# Patient Record
Sex: MALE | Race: WHITE | NOT HISPANIC OR LATINO | URBAN - METROPOLITAN AREA
[De-identification: names, ages, dates, MRNs, and addresses within clinical notes are randomized per-mention and may not be internally consistent; named-entity substitution may affect disease eponyms.]

---

## 2019-03-07 ENCOUNTER — INPATIENT (INPATIENT)
Facility: HOSPITAL | Age: 84
LOS: 0 days | Discharge: HOME | DRG: 259 | End: 2019-03-08
Attending: INTERNAL MEDICINE | Admitting: INTERNAL MEDICINE
Payer: MEDICARE

## 2019-03-07 VITALS
OXYGEN SATURATION: 95 % | SYSTOLIC BLOOD PRESSURE: 151 MMHG | TEMPERATURE: 98 F | HEART RATE: 60 BPM | DIASTOLIC BLOOD PRESSURE: 83 MMHG | WEIGHT: 139.99 LBS | RESPIRATION RATE: 16 BRPM | HEIGHT: 66 IN

## 2019-03-07 DIAGNOSIS — Z45.018 ENCOUNTER FOR ADJUSTMENT AND MANAGEMENT OF OTHER PART OF CARDIAC PACEMAKER: ICD-10-CM

## 2019-03-07 LAB
ANION GAP SERPL CALC-SCNC: 13 MMOL/L — SIGNIFICANT CHANGE UP (ref 5–17)
APTT BLD: 30.8 SEC — SIGNIFICANT CHANGE UP (ref 27.5–36.3)
BUN SERPL-MCNC: 30 MG/DL — HIGH (ref 7–23)
CALCIUM SERPL-MCNC: 9.3 MG/DL — SIGNIFICANT CHANGE UP (ref 8.4–10.5)
CHLORIDE SERPL-SCNC: 99 MMOL/L — SIGNIFICANT CHANGE UP (ref 96–108)
CO2 SERPL-SCNC: 25 MMOL/L — SIGNIFICANT CHANGE UP (ref 22–31)
CREAT SERPL-MCNC: 1.58 MG/DL — HIGH (ref 0.5–1.3)
GLUCOSE SERPL-MCNC: 90 MG/DL — SIGNIFICANT CHANGE UP (ref 70–99)
HCT VFR BLD CALC: 40 % — SIGNIFICANT CHANGE UP (ref 39–50)
HGB BLD-MCNC: 14 G/DL — SIGNIFICANT CHANGE UP (ref 13–17)
INR BLD: 0.97 RATIO — SIGNIFICANT CHANGE UP (ref 0.88–1.16)
MAGNESIUM SERPL-MCNC: 2.5 MG/DL — SIGNIFICANT CHANGE UP (ref 1.6–2.6)
MCHC RBC-ENTMCNC: 32.5 PG — SIGNIFICANT CHANGE UP (ref 27–34)
MCHC RBC-ENTMCNC: 35 GM/DL — SIGNIFICANT CHANGE UP (ref 32–36)
MCV RBC AUTO: 92.9 FL — SIGNIFICANT CHANGE UP (ref 80–100)
PLATELET # BLD AUTO: 262 K/UL — SIGNIFICANT CHANGE UP (ref 150–400)
POTASSIUM SERPL-MCNC: 4.5 MMOL/L — SIGNIFICANT CHANGE UP (ref 3.5–5.3)
POTASSIUM SERPL-SCNC: 4.5 MMOL/L — SIGNIFICANT CHANGE UP (ref 3.5–5.3)
PROTHROM AB SERPL-ACNC: 11 SEC — SIGNIFICANT CHANGE UP (ref 10–12.9)
RBC # BLD: 4.3 M/UL — SIGNIFICANT CHANGE UP (ref 4.2–5.8)
RBC # FLD: 12.7 % — SIGNIFICANT CHANGE UP (ref 10.3–14.5)
SODIUM SERPL-SCNC: 137 MMOL/L — SIGNIFICANT CHANGE UP (ref 135–145)
WBC # BLD: 7.8 K/UL — SIGNIFICANT CHANGE UP (ref 3.8–10.5)
WBC # FLD AUTO: 7.8 K/UL — SIGNIFICANT CHANGE UP (ref 3.8–10.5)

## 2019-03-07 PROCEDURE — 93010 ELECTROCARDIOGRAM REPORT: CPT

## 2019-03-07 RX ORDER — METOPROLOL TARTRATE 50 MG
50 TABLET ORAL
Qty: 0 | Refills: 0 | Status: DISCONTINUED | OUTPATIENT
Start: 2019-03-07 | End: 2019-03-08

## 2019-03-07 RX ORDER — ISOSORBIDE MONONITRATE 60 MG/1
30 TABLET, EXTENDED RELEASE ORAL DAILY
Qty: 0 | Refills: 0 | Status: DISCONTINUED | OUTPATIENT
Start: 2019-03-07 | End: 2019-03-08

## 2019-03-07 RX ORDER — FINASTERIDE 5 MG/1
5 TABLET, FILM COATED ORAL DAILY
Qty: 0 | Refills: 0 | Status: DISCONTINUED | OUTPATIENT
Start: 2019-03-07 | End: 2019-03-08

## 2019-03-07 RX ORDER — SIMVASTATIN 20 MG/1
10 TABLET, FILM COATED ORAL AT BEDTIME
Qty: 0 | Refills: 0 | Status: DISCONTINUED | OUTPATIENT
Start: 2019-03-07 | End: 2019-03-08

## 2019-03-07 RX ORDER — AMLODIPINE BESYLATE 2.5 MG/1
5 TABLET ORAL DAILY
Qty: 0 | Refills: 0 | Status: DISCONTINUED | OUTPATIENT
Start: 2019-03-07 | End: 2019-03-08

## 2019-03-07 RX ORDER — CEFAZOLIN SODIUM 1 G
500 VIAL (EA) INJECTION EVERY 8 HOURS
Qty: 0 | Refills: 0 | Status: COMPLETED | OUTPATIENT
Start: 2019-03-07 | End: 2019-03-08

## 2019-03-07 RX ORDER — LEVOTHYROXINE SODIUM 125 MCG
25 TABLET ORAL DAILY
Qty: 0 | Refills: 0 | Status: DISCONTINUED | OUTPATIENT
Start: 2019-03-07 | End: 2019-03-08

## 2019-03-07 RX ORDER — CEPHALEXIN 500 MG
250 CAPSULE ORAL EVERY 8 HOURS
Qty: 0 | Refills: 0 | Status: DISCONTINUED | OUTPATIENT
Start: 2019-03-09 | End: 2019-03-08

## 2019-03-07 RX ORDER — FUROSEMIDE 40 MG
60 TABLET ORAL DAILY
Qty: 0 | Refills: 0 | Status: DISCONTINUED | OUTPATIENT
Start: 2019-03-07 | End: 2019-03-08

## 2019-03-07 RX ORDER — ASPIRIN/CALCIUM CARB/MAGNESIUM 324 MG
81 TABLET ORAL DAILY
Qty: 0 | Refills: 0 | Status: DISCONTINUED | OUTPATIENT
Start: 2019-03-07 | End: 2019-03-08

## 2019-03-07 RX ADMIN — Medication 100 MILLIGRAM(S): at 22:15

## 2019-03-07 RX ADMIN — Medication 50 MILLIGRAM(S): at 19:02

## 2019-03-07 RX ADMIN — SIMVASTATIN 10 MILLIGRAM(S): 20 TABLET, FILM COATED ORAL at 22:15

## 2019-03-07 NOTE — H&P CARDIOLOGY - PMH
Bradycardia  s/p PPM  Hyperlipidemia    Hypertension BPH (benign prostatic hyperplasia)    Hyperlipidemia    Hypertension    Hypothyroidism (acquired)

## 2019-03-07 NOTE — H&P CARDIOLOGY - HISTORY OF PRESENT ILLNESS
89 yo Male w/ PMH of HTN, HLD and Bradycardia s/p PPM who presents for generator change today. 89 yo  Male w/ PMH of HTN, HLD and Bradycardia s/p PPM who presents for generator change today.  Pt denies dizziness, diaphoresis, palpitations, nausea, vomiting, peripheral edema, recent weight gain, or syncope.

## 2019-03-07 NOTE — CHART NOTE - NSCHARTNOTEFT_GEN_A_CORE
Pacemaker Generator Replacement    Indication: Complete heart block, pacemaker at elective replacement    Preoperative diagnosis: Complete heart block, pacemaker generator fatigue    Post operative dx: complete heart block    Proceduralist : Dr Danny Padgett    After the risk and benefits of the procedure were explained to patient and son, the patient underwent an elective replacement of a St Mo pacemaker generator. The patient was brought to the EP lab and draped and prepped in the usual sterile manner and thereafter , 15 cc of 1 % xylocaine/ Sensorcaine was injected in the left upper chest in the area of the old generator. Alexandr 1.5 oncision was made, , the old generator was removed and then a new St Mo generator was attached to the existing leads, Threshold, lead impedances, P and R waves were stable. The patient was programmed DDDR  BPM. Hemostasis was achieved. The wound was closed with 2.0 Vicryl and 4 o Monocryl. Dermabond and steri-strips was placed. Ancef 2 g were given    Conclusion: Successful generator replacement    recommend:    1./ancef 500 mg q 8 hr x 2 then 250 mg TID x 5 days ( creatinine clearance 28mg/dl)  2/ tylenol for pain  3/ with advanced age, lives in assisted living with minimal assistance, keep overnight for observation

## 2019-03-08 ENCOUNTER — TRANSCRIPTION ENCOUNTER (OUTPATIENT)
Age: 84
End: 2019-03-08

## 2019-03-08 VITALS — SYSTOLIC BLOOD PRESSURE: 144 MMHG | DIASTOLIC BLOOD PRESSURE: 72 MMHG | HEART RATE: 80 BPM

## 2019-03-08 LAB
ANION GAP SERPL CALC-SCNC: 13 MMOL/L — SIGNIFICANT CHANGE UP (ref 5–17)
BUN SERPL-MCNC: 25 MG/DL — HIGH (ref 7–23)
CALCIUM SERPL-MCNC: 8.8 MG/DL — SIGNIFICANT CHANGE UP (ref 8.4–10.5)
CHLORIDE SERPL-SCNC: 101 MMOL/L — SIGNIFICANT CHANGE UP (ref 96–108)
CO2 SERPL-SCNC: 22 MMOL/L — SIGNIFICANT CHANGE UP (ref 22–31)
CREAT SERPL-MCNC: 1.49 MG/DL — HIGH (ref 0.5–1.3)
GLUCOSE SERPL-MCNC: 83 MG/DL — SIGNIFICANT CHANGE UP (ref 70–99)
HCT VFR BLD CALC: 39.1 % — SIGNIFICANT CHANGE UP (ref 39–50)
HGB BLD-MCNC: 13.6 G/DL — SIGNIFICANT CHANGE UP (ref 13–17)
MCHC RBC-ENTMCNC: 32.4 PG — SIGNIFICANT CHANGE UP (ref 27–34)
MCHC RBC-ENTMCNC: 34.8 GM/DL — SIGNIFICANT CHANGE UP (ref 32–36)
MCV RBC AUTO: 93.2 FL — SIGNIFICANT CHANGE UP (ref 80–100)
PLATELET # BLD AUTO: 236 K/UL — SIGNIFICANT CHANGE UP (ref 150–400)
POTASSIUM SERPL-MCNC: 4.2 MMOL/L — SIGNIFICANT CHANGE UP (ref 3.5–5.3)
POTASSIUM SERPL-SCNC: 4.2 MMOL/L — SIGNIFICANT CHANGE UP (ref 3.5–5.3)
RBC # BLD: 4.2 M/UL — SIGNIFICANT CHANGE UP (ref 4.2–5.8)
RBC # FLD: 13.2 % — SIGNIFICANT CHANGE UP (ref 10.3–14.5)
SODIUM SERPL-SCNC: 136 MMOL/L — SIGNIFICANT CHANGE UP (ref 135–145)
WBC # BLD: 9.6 K/UL — SIGNIFICANT CHANGE UP (ref 3.8–10.5)
WBC # FLD AUTO: 9.6 K/UL — SIGNIFICANT CHANGE UP (ref 3.8–10.5)

## 2019-03-08 PROCEDURE — 33228 REMV&REPLC PM GEN DUAL LEAD: CPT

## 2019-03-08 PROCEDURE — 85730 THROMBOPLASTIN TIME PARTIAL: CPT

## 2019-03-08 PROCEDURE — 85027 COMPLETE CBC AUTOMATED: CPT

## 2019-03-08 PROCEDURE — 85610 PROTHROMBIN TIME: CPT

## 2019-03-08 PROCEDURE — 97161 PT EVAL LOW COMPLEX 20 MIN: CPT

## 2019-03-08 PROCEDURE — 93005 ELECTROCARDIOGRAM TRACING: CPT

## 2019-03-08 PROCEDURE — 83735 ASSAY OF MAGNESIUM: CPT

## 2019-03-08 PROCEDURE — C1785: CPT

## 2019-03-08 PROCEDURE — 80048 BASIC METABOLIC PNL TOTAL CA: CPT

## 2019-03-08 RX ORDER — LEVOTHYROXINE SODIUM 125 MCG
1 TABLET ORAL
Qty: 0 | Refills: 0 | COMMUNITY

## 2019-03-08 RX ORDER — SIMVASTATIN 20 MG/1
1 TABLET, FILM COATED ORAL
Qty: 0 | Refills: 0 | COMMUNITY

## 2019-03-08 RX ORDER — INFLUENZA VIRUS VACCINE 15; 15; 15; 15 UG/.5ML; UG/.5ML; UG/.5ML; UG/.5ML
0.5 SUSPENSION INTRAMUSCULAR ONCE
Qty: 0 | Refills: 0 | Status: DISCONTINUED | OUTPATIENT
Start: 2019-03-08 | End: 2019-03-08

## 2019-03-08 RX ORDER — METOPROLOL TARTRATE 50 MG
1 TABLET ORAL
Qty: 0 | Refills: 0 | COMMUNITY

## 2019-03-08 RX ORDER — CEPHALEXIN 500 MG
1 CAPSULE ORAL
Qty: 15 | Refills: 0
Start: 2019-03-08 | End: 2019-03-12

## 2019-03-08 RX ORDER — ACETAMINOPHEN 500 MG
2 TABLET ORAL
Qty: 30 | Refills: 0
Start: 2019-03-08 | End: 2019-03-12

## 2019-03-08 RX ADMIN — Medication 60 MILLIGRAM(S): at 05:49

## 2019-03-08 RX ADMIN — FINASTERIDE 5 MILLIGRAM(S): 5 TABLET, FILM COATED ORAL at 10:00

## 2019-03-08 RX ADMIN — AMLODIPINE BESYLATE 5 MILLIGRAM(S): 2.5 TABLET ORAL at 05:49

## 2019-03-08 RX ADMIN — Medication 100 MILLIGRAM(S): at 05:49

## 2019-03-08 RX ADMIN — Medication 25 MICROGRAM(S): at 05:49

## 2019-03-08 RX ADMIN — Medication 50 MILLIGRAM(S): at 05:49

## 2019-03-08 RX ADMIN — ISOSORBIDE MONONITRATE 30 MILLIGRAM(S): 60 TABLET, EXTENDED RELEASE ORAL at 10:00

## 2019-03-08 RX ADMIN — Medication 81 MILLIGRAM(S): at 10:00

## 2019-03-08 NOTE — DISCHARGE NOTE PROVIDER - CARE PROVIDER_API CALL
Danny Padgett)  Cardiology  [address], [address]  [place] [address]  Phone: [phone]  Fax: [phone]  Follow Up Time: 1 week    Dr. Espino (Internal MD AT [facility]),   Phone: (   )    -  Fax: (   )    -  Follow Up Time: 1-3 days

## 2019-03-08 NOTE — DISCHARGE NOTE NURSING/CASE MANAGEMENT/SOCIAL WORK - NSDCDPATPORTLINK_GEN_ALL_CORE
You can access the [facility] Patient Portal, offered by [facility], by registering with the following website: [email or web address]

## 2019-03-08 NOTE — PHYSICAL THERAPY INITIAL EVALUATION ADULT - PLANNED THERAPY INTERVENTIONS, PT EVAL
Pt is cleared for DC home to [facility], PT will Dc pt at this time as pt is fxl independent, remain on amb aide program

## 2019-03-08 NOTE — DISCHARGE NOTE NURSING/CASE MANAGEMENT/SOCIAL WORK - NSDCCRNAME_GEN_ALL_CORE_FT
[facility] [phone]  Spoke with Yady in [facility] and faxed her a copy of discharge summary per her request.  New medication has been sent to [facility] [phone] by treatment team.

## 2019-03-08 NOTE — DISCHARGE NOTE PROVIDER - NSDCCPTREATMENT_GEN_ALL_CORE_FT
PRINCIPAL PROCEDURE  Procedure: Removal and replacement of multiple lead pacemaker pulse generator  Findings and Treatment: Continue your 5-day course of antibiotics prophylactically and Tylenol as needed for pain.  Follow-up with EP-Dr. Padgett in one week

## 2019-03-08 NOTE — PHYSICAL THERAPY INITIAL EVALUATION ADULT - ACTIVE RANGE OF MOTION EXAMINATION, REHAB EVAL
Right LE Active ROM was WFL (within functional limits)/L shld flex NT 2* PPM change/Left UE Active ROM was WFL (within functional limits)/Left LE Active ROM was WFL (within functional limits)/Right UE Active ROM was WFL (within functional limits)

## 2019-03-08 NOTE — DISCHARGE NOTE PROVIDER - NSDCCPCAREPLAN_GEN_ALL_CORE_FT
PRINCIPAL DISCHARGE DIAGNOSIS  Problem: Encounter for checking or testing of cardiac pacemaker pulse generator  Assessment and Plan of Treatment: s/p generator change. Continue medications as prescribed and follow-up with Dr. Padgett (EP) in one week      SECONDARY DISCHARGE DIAGNOSES  Problem: Benign essential HTN  Assessment and Plan of Treatment: Low salt diet  Activity as tolerated.  Take all medication as prescribed.  Follow up with your medical doctor for routine blood pressure monitoring at your next visit.  Notify your doctor if you have any of the following symptoms:   Dizziness, Lightheadedness, Blurry vision, Headache, Chest pain, Shortness of breath    Problem: HLD (hyperlipidemia)  Assessment and Plan of Treatment: Continue current medications as prescribed.

## 2019-03-08 NOTE — DISCHARGE NOTE PROVIDER - PROVIDER TOKENS
PROVIDER:[TOKEN:[750:MIIS:750],FOLLOWUP:[1 week]],FREE:[LAST:[Dr. Espino (Internal MD AT [facility])],PHONE:[(   )    -],FAX:[(   )    -],FOLLOWUP:[1-3 days]]

## 2019-03-08 NOTE — PHYSICAL THERAPY INITIAL EVALUATION ADULT - PERTINENT HX OF CURRENT PROBLEM, REHAB EVAL
Pt is a 89 yo  Male admitted to [facility] on 3/7/19 for generator change today. Pt denies dizziness, diaphoresis, palpitations, nausea, vomiting, peripheral edema, recent weight gain, or syncope. Pt w/ PMH of HTN, HLD and Bradycardia s/p PPM

## 2019-03-08 NOTE — PHYSICAL THERAPY INITIAL EVALUATION ADULT - ADDITIONAL COMMENTS
pt lives at [facility], +elevator access, amb with rolling walker, assist as needed for ADLs, participates in social events, goes to exercise class

## 2019-03-08 NOTE — DISCHARGE NOTE PROVIDER - HOSPITAL COURSE
89 yo  Male w/ PMH of HTN, HLD and Bradycardia s/p PPM who presents for generator change, with no complaints. Patient underwent an elective replacement of a St Mo pacemaker generator, in which old generator was removed and then a new St Mo generator was attached to the existing leads, Threshold, lead impedances, P and R waves were stable. The patient was programmed DDDR  BPM. Pt s/p successful generator replacement, monitored overnight given advanced age and assisted living with minimal assistance, no events on telemetry, site benign with no signs of active bleeding. Pt to complete 5-day course of antibiotics and f/u with EP in one week for ongoing management, and PCP at Assisted Living-Dr. Espino for routine medical management.

## 2019-03-08 NOTE — PHYSICAL THERAPY INITIAL EVALUATION ADULT - DISCHARGE DISPOSITION, PT EVAL
no skilled PT needs/DC home (return to snf) with no skilled PT needs, owns rolling walker, assist from staff as needed, BABS garcia, pt agrees.

## 2020-10-31 ENCOUNTER — INPATIENT (INPATIENT)
Facility: HOSPITAL | Age: 85
LOS: 8 days | Discharge: HOME | DRG: 291 | End: 2020-11-09
Attending: INTERNAL MEDICINE | Admitting: INTERNAL MEDICINE
Payer: MEDICARE

## 2020-10-31 VITALS
HEIGHT: 66 IN | HEART RATE: 70 BPM | SYSTOLIC BLOOD PRESSURE: 130 MMHG | RESPIRATION RATE: 20 BRPM | WEIGHT: 139.99 LBS | DIASTOLIC BLOOD PRESSURE: 70 MMHG | TEMPERATURE: 99 F | OXYGEN SATURATION: 96 %

## 2020-10-31 DIAGNOSIS — R09.02 HYPOXEMIA: ICD-10-CM

## 2020-10-31 LAB
ALBUMIN SERPL ELPH-MCNC: 4 G/DL — SIGNIFICANT CHANGE UP (ref 3.3–5)
ALP SERPL-CCNC: 89 U/L — SIGNIFICANT CHANGE UP (ref 40–120)
ALT FLD-CCNC: 18 U/L — SIGNIFICANT CHANGE UP (ref 10–45)
ANION GAP SERPL CALC-SCNC: 11 MMOL/L — SIGNIFICANT CHANGE UP (ref 5–17)
APPEARANCE UR: CLEAR — SIGNIFICANT CHANGE UP
APTT BLD: 34 SEC — SIGNIFICANT CHANGE UP (ref 27.5–35.5)
AST SERPL-CCNC: 31 U/L — SIGNIFICANT CHANGE UP (ref 10–40)
BASOPHILS # BLD AUTO: 0.1 K/UL — SIGNIFICANT CHANGE UP (ref 0–0.2)
BASOPHILS NFR BLD AUTO: 1.2 % — SIGNIFICANT CHANGE UP (ref 0–2)
BILIRUB SERPL-MCNC: 0.8 MG/DL — SIGNIFICANT CHANGE UP (ref 0.2–1.2)
BILIRUB UR-MCNC: NEGATIVE — SIGNIFICANT CHANGE UP
BUN SERPL-MCNC: 31 MG/DL — HIGH (ref 7–23)
CALCIUM SERPL-MCNC: 8.9 MG/DL — SIGNIFICANT CHANGE UP (ref 8.4–10.5)
CHLORIDE SERPL-SCNC: 99 MMOL/L — SIGNIFICANT CHANGE UP (ref 96–108)
CO2 SERPL-SCNC: 25 MMOL/L — SIGNIFICANT CHANGE UP (ref 22–31)
COLOR SPEC: SIGNIFICANT CHANGE UP
CREAT SERPL-MCNC: 1.74 MG/DL — HIGH (ref 0.5–1.3)
DIFF PNL FLD: NEGATIVE — SIGNIFICANT CHANGE UP
EOSINOPHIL # BLD AUTO: 0.42 K/UL — SIGNIFICANT CHANGE UP (ref 0–0.5)
EOSINOPHIL NFR BLD AUTO: 5.1 % — SIGNIFICANT CHANGE UP (ref 0–6)
GAS PNL BLDV: SIGNIFICANT CHANGE UP
GLUCOSE SERPL-MCNC: 103 MG/DL — HIGH (ref 70–99)
GLUCOSE UR QL: NEGATIVE — SIGNIFICANT CHANGE UP
HCT VFR BLD CALC: 33.9 % — LOW (ref 39–50)
HGB BLD-MCNC: 10.8 G/DL — LOW (ref 13–17)
IMM GRANULOCYTES NFR BLD AUTO: 0.5 % — SIGNIFICANT CHANGE UP (ref 0–1.5)
INR BLD: 1.26 RATIO — HIGH (ref 0.88–1.16)
KETONES UR-MCNC: NEGATIVE — SIGNIFICANT CHANGE UP
LEUKOCYTE ESTERASE UR-ACNC: NEGATIVE — SIGNIFICANT CHANGE UP
LYMPHOCYTES # BLD AUTO: 0.92 K/UL — LOW (ref 1–3.3)
LYMPHOCYTES # BLD AUTO: 11.2 % — LOW (ref 13–44)
MCHC RBC-ENTMCNC: 30.8 PG — SIGNIFICANT CHANGE UP (ref 27–34)
MCHC RBC-ENTMCNC: 31.9 GM/DL — LOW (ref 32–36)
MCV RBC AUTO: 96.6 FL — SIGNIFICANT CHANGE UP (ref 80–100)
MONOCYTES # BLD AUTO: 0.96 K/UL — HIGH (ref 0–0.9)
MONOCYTES NFR BLD AUTO: 11.6 % — SIGNIFICANT CHANGE UP (ref 2–14)
NEUTROPHILS # BLD AUTO: 5.81 K/UL — SIGNIFICANT CHANGE UP (ref 1.8–7.4)
NEUTROPHILS NFR BLD AUTO: 70.4 % — SIGNIFICANT CHANGE UP (ref 43–77)
NITRITE UR-MCNC: NEGATIVE — SIGNIFICANT CHANGE UP
NRBC # BLD: 0 /100 WBCS — SIGNIFICANT CHANGE UP (ref 0–0)
PH UR: 6.5 — SIGNIFICANT CHANGE UP (ref 5–8)
PLATELET # BLD AUTO: 354 K/UL — SIGNIFICANT CHANGE UP (ref 150–400)
POTASSIUM SERPL-MCNC: 4.4 MMOL/L — SIGNIFICANT CHANGE UP (ref 3.5–5.3)
POTASSIUM SERPL-SCNC: 4.4 MMOL/L — SIGNIFICANT CHANGE UP (ref 3.5–5.3)
PROT SERPL-MCNC: 6.8 G/DL — SIGNIFICANT CHANGE UP (ref 6–8.3)
PROT UR-MCNC: NEGATIVE — SIGNIFICANT CHANGE UP
PROTHROM AB SERPL-ACNC: 15 SEC — HIGH (ref 10.6–13.6)
RBC # BLD: 3.51 M/UL — LOW (ref 4.2–5.8)
RBC # FLD: 17 % — HIGH (ref 10.3–14.5)
SARS-COV-2 RNA SPEC QL NAA+PROBE: SIGNIFICANT CHANGE UP
SODIUM SERPL-SCNC: 135 MMOL/L — SIGNIFICANT CHANGE UP (ref 135–145)
SP GR SPEC: 1.01 — SIGNIFICANT CHANGE UP (ref 1.01–1.02)
UROBILINOGEN FLD QL: NEGATIVE — SIGNIFICANT CHANGE UP
WBC # BLD: 8.25 K/UL — SIGNIFICANT CHANGE UP (ref 3.8–10.5)
WBC # FLD AUTO: 8.25 K/UL — SIGNIFICANT CHANGE UP (ref 3.8–10.5)

## 2020-10-31 PROCEDURE — 71045 X-RAY EXAM CHEST 1 VIEW: CPT | Mod: 26

## 2020-10-31 PROCEDURE — 93010 ELECTROCARDIOGRAM REPORT: CPT

## 2020-10-31 PROCEDURE — 99285 EMERGENCY DEPT VISIT HI MDM: CPT

## 2020-10-31 PROCEDURE — 99223 1ST HOSP IP/OBS HIGH 75: CPT

## 2020-10-31 RX ORDER — AZITHROMYCIN 500 MG/1
500 TABLET, FILM COATED ORAL ONCE
Refills: 0 | Status: COMPLETED | OUTPATIENT
Start: 2020-10-31 | End: 2020-10-31

## 2020-10-31 RX ORDER — CEFTRIAXONE 500 MG/1
1000 INJECTION, POWDER, FOR SOLUTION INTRAMUSCULAR; INTRAVENOUS ONCE
Refills: 0 | Status: COMPLETED | OUTPATIENT
Start: 2020-10-31 | End: 2020-10-31

## 2020-10-31 RX ADMIN — CEFTRIAXONE 100 MILLIGRAM(S): 500 INJECTION, POWDER, FOR SOLUTION INTRAMUSCULAR; INTRAVENOUS at 19:56

## 2020-10-31 RX ADMIN — AZITHROMYCIN 250 MILLIGRAM(S): 500 TABLET, FILM COATED ORAL at 20:34

## 2020-10-31 NOTE — H&P ADULT - HISTORY OF PRESENT ILLNESS
89 yo male with PMH of HTN, HLD, Bradycardia s/p PPM, presents here from assisted living for hypoxia.       89 yo male with PMH of HTN, HLD, Bradycardia s/p PPM, on aspirin and eliquis, hypothyroidism, BPH, presents here from assisted living for hypoxia.  Patient states for last few days he noticed b/l LE swelling right > left.  He also has been having mild SOB, today started having some cough, unable to bring up phlegm.  He denies any fever or chills.  Also denies chest pain, orthopnea, PND.  Denies nausea, vomiting, abdominal pain, diarrhea.  Per ED note, patient was found to have oxygen saturation of 85%, was started on 4L NC.  CXR was consistent with pneumonia and therefore patient was sent to [facility].  On arrival, patient's vitals showed temp 98.9, HR 70, /70, saturation 96% on 4L, now on 2L.  Patient otherwise currently denies any complaints.           89 yo male with PMH of HTN, HLD, Bradycardia s/p PPM, on aspirin and eliquis, hypothyroidism, BPH, presents here from assisted living for hypoxia.  History obtained from both patient and staff at Assisted living.  Patient states for last few days he noticed b/l LE swelling right > left.  He also has been having mild SOB, today started having some cough, unable to bring up phlegm.  He denies any fever or chills.  Also denies chest pain, orthopnea, PND.  Denies nausea, vomiting, abdominal pain, diarrhea.  Patient was ordered amoxicillin, but because of hypoxia of 85%, he was sent here for further evaluation.  He also had CXR was consistent with pneumonia and therefore patient was sent to [facility].  On arrival, patient's vitals showed temp 98.9, HR 70, /70, saturation 96% on 4L, now on 2L.  Patient otherwise currently denies any complaints.

## 2020-10-31 NOTE — ED PROVIDER NOTE - CARE PLAN
Principal Discharge DX:	Hypoxia  Secondary Diagnosis:	Pneumonia   Principal Discharge DX:	Hypoxia  Secondary Diagnosis:	Congestive heart failure, unspecified HF chronicity, unspecified heart failure type

## 2020-10-31 NOTE — ED CLERICAL - NS ED CLERK NOTE PRE-ARRIVAL INFORMATION; ADDITIONAL PRE-ARRIVAL INFORMATION
CC/Reason For referral: p.e. hard to breath. ox levels low 90%  Preferred Consultant(if applicable):  Who admits for you (if needed): abrudescu  Do you have documents you would like to fax over?  Would you still like to speak to an ED attending? yes

## 2020-10-31 NOTE — H&P ADULT - PROBLEM SELECTOR PLAN 1
Possibly 2/2 to pneumonia vs. CHF exacerbation  started on IV ceftriaxone, azithromycin, which will continue for now  check urine legionella  blood culture if patient develops fever  check ESR, CRP, Procalcitonine  check echocardiogram  c/w IV diuretics Possibly 2/2 to pneumonia vs. CHF exacerbation  started on IV ceftriaxone, azithromycin, which will continue for now  check urine legionella  blood culture  check ESR, CRP, Procalcitonine  check echocardiogram  c/w IV diuretics

## 2020-10-31 NOTE — H&P ADULT - PROBLEM SELECTOR PLAN 3
Unclear baseline creatinine  may be 2/2 to heart failure  will start IV lasix 40mg q12 for now  avoid other nephrotoxic agents

## 2020-10-31 NOTE — ED PROVIDER NOTE - OBJECTIVE STATEMENT
Cait Horner MD: 89yo M with PMH of HTN, HLD, bradycardia s/p PPM insertion on asa and Eliquis, hypothyroidism, BPH who presents from assisted living for hypoxia. As per EMS report, O2 sat is 85% requiring 4L NC. CXR done today reportedly showed pneumonia. Pt denies any symptoms except for increased LE swelling b/l about a week ago. Now satting 92% on RA, placed on 2L NC.     PMD: Dr. Espino Cait Horner MD: 89yo M with PMH of HTN, HLD, bradycardia s/p PPM insertion on asa and Eliquis, hypothyroidism, BPH who presents from assisted living for hypoxia. As per EMS report, O2 sat is 85% requiring 4L NC. CXR done today reportedly showed pneumonia. Pt denies any symptoms except for increased LE swelling b/l about a week ago. Now satting 92% on RA, placed on 2L NC.     PMD: Dr. Espino  Cards: Dr John Barrientos

## 2020-10-31 NOTE — ED ADULT NURSE NOTE - OBJECTIVE STATEMENT
89yr old male w/ pmhx of HTN, HLD, PPM (on ASA, and eliquis) and BPH BIBEMS from assisted living facility c/o difficulty breathing. Per EMS pt was having some difficulting breathing and sating at 85% on RA, CXR performed at assisted living facility that showed pt has PNA. Pt states he has been experiencing dyspnea for the past 2 days along with edema of B/L Extremities. Pt denies CP, NVD, Fevers, chills, GI,  symptoms, syncope or numbness and tingling of extremities. Upon assessment, +3 pitting edema noted on B/L extremities. Pt sating at 92% on RA, pt placed on CM, and 2L of NC. Pt assessed by MD, will reassess

## 2020-10-31 NOTE — ED ADULT NURSE NOTE - CHIEF COMPLAINT QUOTE
from assisted living with diff breathing; dx with pneumonia via xray; hypoxic on room air; 85% on ra

## 2020-10-31 NOTE — H&P ADULT - ASSESSMENT
89 yo male with PMH of HTN, HLD, Bradycardia s/p PPM, on aspirin and eliquis, hypothyroidism, BPH, presents here from assisted living for hypoxia.

## 2020-10-31 NOTE — ED PROVIDER NOTE - CLINICAL SUMMARY MEDICAL DECISION MAKING FREE TEXT BOX
Cait Horner MD: 89yo M with PMH of HTN, HLD, bradycardia s/p PPM insertion on asa and Eliquis, hypothyroidism, BPH who presents from assisted living for hypoxia in setting of pna on CXR. Pt is hypoxic to 92% on RA, now requiring 2L NC. Appears comfortable. Will send labs, CXR, EKG, start IV abx Cait Horner MD: 89yo M with PMH of HTN, HLD, bradycardia s/p PPM insertion on asa and Eliquis, hypothyroidism, BPH who presents from assisted living for hypoxia in setting of pna on CXR. Pt is hypoxic to 92% on RA, now requiring 2L NC. Appears comfortable. Will send labs, CXR, EKG, start IV abx    Swathi Patel MD - Attending Physician: Pt here with mild hypoxia, RA sat 92%. No fevers. Xr with possible PNA outpatient. Noted LE edema, rales on exam. Likely CHF, possible PNA tho. Labs, cultures, xr, empiric abx, tba

## 2020-10-31 NOTE — H&P ADULT - ATTENDING COMMENTS
Patient assigned to me by night hospitalist in charge for management and care for patient for this evening only. Care to be resumed by day hospitalist (Dr. Sanchez) in the morning and thereafter.     Patient's care rendered on 10/31/20 at 11:45PM.      Plan discussed with Patient, RN, nursing home staff.

## 2020-10-31 NOTE — H&P ADULT - NSHPLABSRESULTS_GEN_ALL_CORE
Labs personally reviewed:                          10.8   8.25  )-----------( 354      ( 31 Oct 2020 19:56 )             33.9     10-31    135  |  99  |  31<H>  ----------------------------<  103<H>  4.4   |  25  |  1.74<H>    Ca    8.9      31 Oct 2020 19:56    TPro  6.8  /  Alb  4.0  /  TBili  0.8  /  DBili  x   /  AST  31  /  ALT  18  /  AlkPhos  89  10-31        LIVER FUNCTIONS - ( 31 Oct 2020 19:56 )  Alb: 4.0 g/dL / Pro: 6.8 g/dL / ALK PHOS: 89 U/L / ALT: 18 U/L / AST: 31 U/L / GGT: x           PT/INR - ( 31 Oct 2020 19:56 )   PT: 15.0 sec;   INR: 1.26 ratio         PTT - ( 31 Oct 2020 19:56 )  PTT:34.0 sec  Urinalysis Basic - ( 31 Oct 2020 20:28 )    Color: Light Yellow / Appearance: Clear / S.012 / pH: x  Gluc: x / Ketone: Negative  / Bili: Negative / Urobili: Negative   Blood: x / Protein: Negative / Nitrite: Negative   Leuk Esterase: Negative / RBC: x / WBC x   Sq Epi: x / Non Sq Epi: x / Bacteria: x      CAPILLARY BLOOD GLUCOSE          Imaging:  CXR personally reviewed: right pleural effusion    EKG pending

## 2020-10-31 NOTE — H&P ADULT - NSHPPHYSICALEXAM_GEN_ALL_CORE
PHYSICAL EXAM:  Vital Signs Last 24 Hrs  T(C): 36.4 (10-31-20 @ 22:28)  T(F): 97.6 (10-31-20 @ 22:28), Max: 98.9 (10-31-20 @ 19:13)  HR: 69 (10-31-20 @ 22:28) (69 - 76)  BP: 144/78 (10-31-20 @ 22:28)  BP(mean): --  RR: 18 (10-31-20 @ 22:28) (17 - 20)  SpO2: 97% (10-31-20 @ 22:28) (92% - 97%)  Wt(kg): --    Constitutional: NAD, awake and alert  EYES: EOMI  ENT:  Normal Hearing, no tonsillar exudates   Neck: Soft and supple, + JVD  Lungs: mild crackles right base  Heart: S1 and S2, regular rate and rhythm, no Murmurs, gallops or rubs  Abdomen: Bowel Sounds present, soft, nontender, nondistended, no guarding, no rebound  Extremities: +3 edema b/l LE; no calf tenderness  Vascular: 2+ peripheral pulses lower ex  Neurological: A/O x 3, no focal deficits  Musculoskeletal: 5/5 strength b/l upper and lower extremities  Skin: No rashes  Psych: no depression or anhedonia  HEME: no bruises, no nose bleeds

## 2020-10-31 NOTE — ED PROVIDER NOTE - PHYSICAL EXAMINATION
CONSTITUTIONAL: Nontoxic, well nourished, well developed, elderly male, resting comfortably in no acute distress  HEAD: Normocephalic; atraumatic  EYES: Normal inspection, EOMI  ENMT: External appears normal; normal oropharynx  NECK: Supple; non-tender; no cervical lymphadenopathy  CARD: RRR; no audible murmurs, rubs, or gallops  RESP: No respiratory distress, decreased lung sounds in R lower lobe  ABD: Soft, non-distended; non-tender; no rebound or guarding  EXT: 3+ b/l LE pitting edema, no calf tenderness; distal pulses intact with good capillary refill  SKIN: Warm, dry, intact  NEURO: aaox3, moving all extremities spontaneously

## 2020-10-31 NOTE — H&P ADULT - NSHPSOCIALHISTORY_GEN_ALL_CORE
Denies tobacco use, drinks wine occasionally, walks with a cane, resident at [facility] assisted living

## 2020-10-31 NOTE — H&P ADULT - PROBLEM SELECTOR PLAN 2
Patient with right pleural effusion, JVD, b/l LE edema, elevated BNP  robb tart IV lasix 40mg q12 for now  monitor ins/outs and daily weights  check echocardiogram Patient with right pleural effusion, JVD, b/l LE edema, elevated BNP  Patient has elevated troponin, possibly 2/2 to CHF vs. RJ  Denies any chest pain  trend cardiac enzymes  robb start IV lasix 40mg q12 for now  monitor ins/outs and daily weights  check echocardiogram

## 2020-10-31 NOTE — ED PROVIDER NOTE - PROGRESS NOTE DETAILS
Spoke with Dr Barrientos, pt's cardiologist. Sent pt in after call from NH with hypoxia and week of increasing LE edema. Requesting ECHO for tomorrow. Will come see patient in AM. Dr Sanchez aware of patient for admission. Cait Horner MD: RLL opacity on CXR. Treated with IV ceftriaxone and azithromycin for pna. Spoke to Dr. Sanchez for admission Cait Horner MD: RLL opacity on CXR atelectasis vs pna. Treated with IV ceftriaxone and azithromycin for empiric pna. Spoke to Dr. Sanchez for admission

## 2020-10-31 NOTE — ED PROVIDER NOTE - PMH
BPH (benign prostatic hyperplasia)    Hyperlipidemia    Hypertension    Hypothyroidism (acquired)

## 2020-10-31 NOTE — H&P ADULT - NSICDXPASTMEDICALHX_GEN_ALL_CORE_FT
PAST MEDICAL HISTORY:  BPH (benign prostatic hyperplasia)     H/O cardiac arrhythmia     Hyperlipidemia     Hypertension     Hypothyroidism (acquired)

## 2020-10-31 NOTE — H&P ADULT - NSHPREVIEWOFSYSTEMS_GEN_ALL_CORE
CONSTITUTIONAL: No weakness, fevers or chills  EYES/ENT: No visual changes;  No dysphagia  NECK: No pain or stiffness  RESPIRATORY: +cough, +SOB  CARDIOVASCULAR: No chest pain or palpitations; + lower extremity edema  EXTREMITIES: no cyanosis, clubbing  MUSCULOSKELETAL: no joint pain, swelling  GASTROINTESTINAL: No abdominal or epigastric pain. No nausea, vomiting, or hematemesis; No diarrhea or constipation. No melena or hematochezia.  BACK: No back pain  GENITOURINARY: No dysuria, frequency or hematuria  NEUROLOGICAL: No numbness or weakness  SKIN: No itching, burning, rashes, or lesions   PSYCH: no agitation  All other review of systems is negative unless indicated above.

## 2020-11-01 DIAGNOSIS — E78.5 HYPERLIPIDEMIA, UNSPECIFIED: ICD-10-CM

## 2020-11-01 DIAGNOSIS — I50.9 HEART FAILURE, UNSPECIFIED: ICD-10-CM

## 2020-11-01 DIAGNOSIS — Z29.9 ENCOUNTER FOR PROPHYLACTIC MEASURES, UNSPECIFIED: ICD-10-CM

## 2020-11-01 DIAGNOSIS — N17.9 ACUTE KIDNEY FAILURE, UNSPECIFIED: ICD-10-CM

## 2020-11-01 DIAGNOSIS — N40.0 BENIGN PROSTATIC HYPERPLASIA WITHOUT LOWER URINARY TRACT SYMPTOMS: ICD-10-CM

## 2020-11-01 DIAGNOSIS — I10 ESSENTIAL (PRIMARY) HYPERTENSION: ICD-10-CM

## 2020-11-01 DIAGNOSIS — E78.2 MIXED HYPERLIPIDEMIA: ICD-10-CM

## 2020-11-01 DIAGNOSIS — Z90.89 ACQUIRED ABSENCE OF OTHER ORGANS: Chronic | ICD-10-CM

## 2020-11-01 DIAGNOSIS — E03.9 HYPOTHYROIDISM, UNSPECIFIED: ICD-10-CM

## 2020-11-01 DIAGNOSIS — R09.89 OTHER SPECIFIED SYMPTOMS AND SIGNS INVOLVING THE CIRCULATORY AND RESPIRATORY SYSTEMS: ICD-10-CM

## 2020-11-01 DIAGNOSIS — Z86.79 PERSONAL HISTORY OF OTHER DISEASES OF THE CIRCULATORY SYSTEM: ICD-10-CM

## 2020-11-01 DIAGNOSIS — J96.01 ACUTE RESPIRATORY FAILURE WITH HYPOXIA: ICD-10-CM

## 2020-11-01 DIAGNOSIS — R60.0 LOCALIZED EDEMA: ICD-10-CM

## 2020-11-01 LAB
ALBUMIN SERPL ELPH-MCNC: 4 G/DL — SIGNIFICANT CHANGE UP (ref 3.3–5)
ALP SERPL-CCNC: 93 U/L — SIGNIFICANT CHANGE UP (ref 40–120)
ALT FLD-CCNC: 21 U/L — SIGNIFICANT CHANGE UP (ref 10–45)
ANION GAP SERPL CALC-SCNC: 13 MMOL/L — SIGNIFICANT CHANGE UP (ref 5–17)
ANION GAP SERPL CALC-SCNC: 14 MMOL/L — SIGNIFICANT CHANGE UP (ref 5–17)
AST SERPL-CCNC: 34 U/L — SIGNIFICANT CHANGE UP (ref 10–40)
BILIRUB SERPL-MCNC: 1 MG/DL — SIGNIFICANT CHANGE UP (ref 0.2–1.2)
BUN SERPL-MCNC: 27 MG/DL — HIGH (ref 7–23)
BUN SERPL-MCNC: 27 MG/DL — HIGH (ref 7–23)
CALCIUM SERPL-MCNC: 9.2 MG/DL — SIGNIFICANT CHANGE UP (ref 8.4–10.5)
CALCIUM SERPL-MCNC: 9.2 MG/DL — SIGNIFICANT CHANGE UP (ref 8.4–10.5)
CHLORIDE SERPL-SCNC: 97 MMOL/L — SIGNIFICANT CHANGE UP (ref 96–108)
CHLORIDE SERPL-SCNC: 97 MMOL/L — SIGNIFICANT CHANGE UP (ref 96–108)
CO2 SERPL-SCNC: 24 MMOL/L — SIGNIFICANT CHANGE UP (ref 22–31)
CO2 SERPL-SCNC: 25 MMOL/L — SIGNIFICANT CHANGE UP (ref 22–31)
CREAT SERPL-MCNC: 1.49 MG/DL — HIGH (ref 0.5–1.3)
CREAT SERPL-MCNC: 1.54 MG/DL — HIGH (ref 0.5–1.3)
CRP SERPL-MCNC: 1.71 MG/DL — HIGH (ref 0–0.4)
CULTURE RESULTS: SIGNIFICANT CHANGE UP
ERYTHROCYTE [SEDIMENTATION RATE] IN BLOOD: 47 MM/HR — HIGH (ref 0–20)
GLUCOSE SERPL-MCNC: 91 MG/DL — SIGNIFICANT CHANGE UP (ref 70–99)
GLUCOSE SERPL-MCNC: 92 MG/DL — SIGNIFICANT CHANGE UP (ref 70–99)
HCT VFR BLD CALC: 34.2 % — LOW (ref 39–50)
HCT VFR BLD CALC: 35.3 % — LOW (ref 39–50)
HGB BLD-MCNC: 11 G/DL — LOW (ref 13–17)
HGB BLD-MCNC: 11.1 G/DL — LOW (ref 13–17)
MAGNESIUM SERPL-MCNC: 2.2 MG/DL — SIGNIFICANT CHANGE UP (ref 1.6–2.6)
MCHC RBC-ENTMCNC: 30.1 PG — SIGNIFICANT CHANGE UP (ref 27–34)
MCHC RBC-ENTMCNC: 30.8 PG — SIGNIFICANT CHANGE UP (ref 27–34)
MCHC RBC-ENTMCNC: 31.4 GM/DL — LOW (ref 32–36)
MCHC RBC-ENTMCNC: 32.2 GM/DL — SIGNIFICANT CHANGE UP (ref 32–36)
MCV RBC AUTO: 95.7 FL — SIGNIFICANT CHANGE UP (ref 80–100)
MCV RBC AUTO: 95.8 FL — SIGNIFICANT CHANGE UP (ref 80–100)
NRBC # BLD: 0 /100 WBCS — SIGNIFICANT CHANGE UP (ref 0–0)
NRBC # BLD: 0 /100 WBCS — SIGNIFICANT CHANGE UP (ref 0–0)
PHOSPHATE SERPL-MCNC: 3.3 MG/DL — SIGNIFICANT CHANGE UP (ref 2.5–4.5)
PLATELET # BLD AUTO: 361 K/UL — SIGNIFICANT CHANGE UP (ref 150–400)
PLATELET # BLD AUTO: 363 K/UL — SIGNIFICANT CHANGE UP (ref 150–400)
POTASSIUM SERPL-MCNC: 3.9 MMOL/L — SIGNIFICANT CHANGE UP (ref 3.5–5.3)
POTASSIUM SERPL-MCNC: 4 MMOL/L — SIGNIFICANT CHANGE UP (ref 3.5–5.3)
POTASSIUM SERPL-SCNC: 3.9 MMOL/L — SIGNIFICANT CHANGE UP (ref 3.5–5.3)
POTASSIUM SERPL-SCNC: 4 MMOL/L — SIGNIFICANT CHANGE UP (ref 3.5–5.3)
PROCALCITONIN SERPL-MCNC: 0.15 NG/ML — HIGH (ref 0.02–0.1)
PROT SERPL-MCNC: 7.2 G/DL — SIGNIFICANT CHANGE UP (ref 6–8.3)
RBC # BLD: 3.57 M/UL — LOW (ref 4.2–5.8)
RBC # BLD: 3.69 M/UL — LOW (ref 4.2–5.8)
RBC # FLD: 16.8 % — HIGH (ref 10.3–14.5)
RBC # FLD: 17 % — HIGH (ref 10.3–14.5)
SARS-COV-2 IGG SERPL QL IA: NEGATIVE — SIGNIFICANT CHANGE UP
SARS-COV-2 IGM SERPL IA-ACNC: 0.1 INDEX — SIGNIFICANT CHANGE UP
SODIUM SERPL-SCNC: 135 MMOL/L — SIGNIFICANT CHANGE UP (ref 135–145)
SODIUM SERPL-SCNC: 135 MMOL/L — SIGNIFICANT CHANGE UP (ref 135–145)
SPECIMEN SOURCE: SIGNIFICANT CHANGE UP
TROPONIN T, HIGH SENSITIVITY RESULT: 70 NG/L — HIGH (ref 0–51)
TROPONIN T, HIGH SENSITIVITY RESULT: 71 NG/L — HIGH (ref 0–51)
WBC # BLD: 9.76 K/UL — SIGNIFICANT CHANGE UP (ref 3.8–10.5)
WBC # BLD: 9.99 K/UL — SIGNIFICANT CHANGE UP (ref 3.8–10.5)
WBC # FLD AUTO: 9.76 K/UL — SIGNIFICANT CHANGE UP (ref 3.8–10.5)
WBC # FLD AUTO: 9.99 K/UL — SIGNIFICANT CHANGE UP (ref 3.8–10.5)

## 2020-11-01 PROCEDURE — 71250 CT THORAX DX C-: CPT | Mod: 26

## 2020-11-01 PROCEDURE — 93010 ELECTROCARDIOGRAM REPORT: CPT

## 2020-11-01 RX ORDER — FINASTERIDE 5 MG/1
5 TABLET, FILM COATED ORAL DAILY
Refills: 0 | Status: DISCONTINUED | OUTPATIENT
Start: 2020-11-01 | End: 2020-11-09

## 2020-11-01 RX ORDER — ACETAMINOPHEN 500 MG
650 TABLET ORAL EVERY 8 HOURS
Refills: 0 | Status: DISCONTINUED | OUTPATIENT
Start: 2020-11-01 | End: 2020-11-09

## 2020-11-01 RX ORDER — SIMVASTATIN 20 MG/1
10 TABLET, FILM COATED ORAL AT BEDTIME
Refills: 0 | Status: DISCONTINUED | OUTPATIENT
Start: 2020-11-01 | End: 2020-11-05

## 2020-11-01 RX ORDER — FUROSEMIDE 40 MG
40 TABLET ORAL
Refills: 0 | Status: DISCONTINUED | OUTPATIENT
Start: 2020-11-01 | End: 2020-11-02

## 2020-11-01 RX ORDER — AZITHROMYCIN 500 MG/1
500 TABLET, FILM COATED ORAL EVERY 24 HOURS
Refills: 0 | Status: DISCONTINUED | OUTPATIENT
Start: 2020-11-01 | End: 2020-11-03

## 2020-11-01 RX ORDER — AMLODIPINE BESYLATE 2.5 MG/1
5 TABLET ORAL DAILY
Refills: 0 | Status: DISCONTINUED | OUTPATIENT
Start: 2020-11-01 | End: 2020-11-03

## 2020-11-01 RX ORDER — METOPROLOL TARTRATE 50 MG
1 TABLET ORAL
Qty: 0 | Refills: 0 | DISCHARGE

## 2020-11-01 RX ORDER — ASPIRIN/CALCIUM CARB/MAGNESIUM 324 MG
1 TABLET ORAL
Qty: 0 | Refills: 0 | DISCHARGE

## 2020-11-01 RX ORDER — METOPROLOL TARTRATE 50 MG
50 TABLET ORAL DAILY
Refills: 0 | Status: DISCONTINUED | OUTPATIENT
Start: 2020-11-01 | End: 2020-11-02

## 2020-11-01 RX ORDER — ISOSORBIDE MONONITRATE 60 MG/1
1 TABLET, EXTENDED RELEASE ORAL
Qty: 0 | Refills: 0 | DISCHARGE

## 2020-11-01 RX ORDER — FINASTERIDE 5 MG/1
1 TABLET, FILM COATED ORAL
Qty: 0 | Refills: 0 | DISCHARGE

## 2020-11-01 RX ORDER — CEFTRIAXONE 500 MG/1
1000 INJECTION, POWDER, FOR SOLUTION INTRAMUSCULAR; INTRAVENOUS EVERY 24 HOURS
Refills: 0 | Status: DISCONTINUED | OUTPATIENT
Start: 2020-11-01 | End: 2020-11-03

## 2020-11-01 RX ORDER — ASPIRIN/CALCIUM CARB/MAGNESIUM 324 MG
81 TABLET ORAL DAILY
Refills: 0 | Status: DISCONTINUED | OUTPATIENT
Start: 2020-11-01 | End: 2020-11-09

## 2020-11-01 RX ORDER — FUROSEMIDE 40 MG
3 TABLET ORAL
Qty: 0 | Refills: 0 | DISCHARGE

## 2020-11-01 RX ORDER — APIXABAN 2.5 MG/1
2.5 TABLET, FILM COATED ORAL EVERY 12 HOURS
Refills: 0 | Status: DISCONTINUED | OUTPATIENT
Start: 2020-11-01 | End: 2020-11-04

## 2020-11-01 RX ORDER — ISOSORBIDE MONONITRATE 60 MG/1
30 TABLET, EXTENDED RELEASE ORAL DAILY
Refills: 0 | Status: DISCONTINUED | OUTPATIENT
Start: 2020-11-01 | End: 2020-11-04

## 2020-11-01 RX ORDER — AMLODIPINE BESYLATE 2.5 MG/1
1 TABLET ORAL
Qty: 0 | Refills: 0 | DISCHARGE

## 2020-11-01 RX ORDER — ATORVASTATIN CALCIUM 80 MG/1
20 TABLET, FILM COATED ORAL AT BEDTIME
Refills: 0 | Status: DISCONTINUED | OUTPATIENT
Start: 2020-11-01 | End: 2020-11-09

## 2020-11-01 RX ORDER — LEVOTHYROXINE SODIUM 125 MCG
25 TABLET ORAL DAILY
Refills: 0 | Status: DISCONTINUED | OUTPATIENT
Start: 2020-11-01 | End: 2020-11-03

## 2020-11-01 RX ADMIN — APIXABAN 2.5 MILLIGRAM(S): 2.5 TABLET, FILM COATED ORAL at 17:12

## 2020-11-01 RX ADMIN — CEFTRIAXONE 100 MILLIGRAM(S): 500 INJECTION, POWDER, FOR SOLUTION INTRAMUSCULAR; INTRAVENOUS at 21:35

## 2020-11-01 RX ADMIN — Medication 25 MICROGRAM(S): at 05:41

## 2020-11-01 RX ADMIN — Medication 81 MILLIGRAM(S): at 11:49

## 2020-11-01 RX ADMIN — ATORVASTATIN CALCIUM 20 MILLIGRAM(S): 80 TABLET, FILM COATED ORAL at 21:35

## 2020-11-01 RX ADMIN — FINASTERIDE 5 MILLIGRAM(S): 5 TABLET, FILM COATED ORAL at 11:49

## 2020-11-01 RX ADMIN — Medication 40 MILLIGRAM(S): at 17:12

## 2020-11-01 RX ADMIN — Medication 40 MILLIGRAM(S): at 02:06

## 2020-11-01 RX ADMIN — AZITHROMYCIN 250 MILLIGRAM(S): 500 TABLET, FILM COATED ORAL at 21:35

## 2020-11-01 RX ADMIN — APIXABAN 2.5 MILLIGRAM(S): 2.5 TABLET, FILM COATED ORAL at 05:41

## 2020-11-01 RX ADMIN — ISOSORBIDE MONONITRATE 30 MILLIGRAM(S): 60 TABLET, EXTENDED RELEASE ORAL at 11:49

## 2020-11-01 RX ADMIN — SIMVASTATIN 10 MILLIGRAM(S): 20 TABLET, FILM COATED ORAL at 21:35

## 2020-11-01 RX ADMIN — AMLODIPINE BESYLATE 5 MILLIGRAM(S): 2.5 TABLET ORAL at 05:41

## 2020-11-01 RX ADMIN — Medication 50 MILLIGRAM(S): at 05:41

## 2020-11-01 NOTE — PROGRESS NOTE ADULT - SUBJECTIVE AND OBJECTIVE BOX
Patient is a 89y old  Male who presents with a chief complaint of hypoxia (2020 13:22)    Admitted overnight by hospitalist service   SUBJECTIVE / OVERNIGHT EVENTS: feels better.  Review of Systems  chest pain no  palpitations no  sob improving   nausea no  headache no    MEDICATIONS  (STANDING):  amLODIPine   Tablet 5 milliGRAM(s) Oral daily  apixaban 2.5 milliGRAM(s) Oral every 12 hours  aspirin enteric coated 81 milliGRAM(s) Oral daily  atorvastatin 20 milliGRAM(s) Oral at bedtime  azithromycin  IVPB 500 milliGRAM(s) IV Intermittent every 24 hours  cefTRIAXone   IVPB 1000 milliGRAM(s) IV Intermittent every 24 hours  finasteride 5 milliGRAM(s) Oral daily  furosemide   Injectable 40 milliGRAM(s) IV Push two times a day  isosorbide   mononitrate ER Tablet (IMDUR) 30 milliGRAM(s) Oral daily  levothyroxine 25 MICROGram(s) Oral daily  metoprolol tartrate 50 milliGRAM(s) Oral daily  simvastatin 10 milliGRAM(s) Oral at bedtime    MEDICATIONS  (PRN):  acetaminophen   Tablet .. 650 milliGRAM(s) Oral every 8 hours PRN Mild Pain (1 - 3)      Vital Signs Last 24 Hrs  T(C): 36.3 (2020 11:20), Max: 37.2 (31 Oct 2020 19:13)  T(F): 97.4 (2020 11:20), Max: 98.9 (31 Oct 2020 19:13)  HR: 72 (2020 11:20) (69 - 76)  BP: 133/67 (2020 11:20) (130/70 - 163/70)  BP(mean): --  RR: 18 (2020 11:20) (17 - 20)  SpO2: 95% (2020 11:20) (92% - 97%)    PHYSICAL EXAM:  GENERAL: NAD, well-developed  HEAD:  Atraumatic, Normocephalic  EYES: EOMI, PERRLA, conjunctiva and sclera clear  NECK: Supple, No JVD  CHEST/LUNG: decreased R L basal BS No wheeze  HEART: Regular rate and rhythm; No murmurs, rubs, or gallops  ABDOMEN: Soft, Nontender, Nondistended; Bowel sounds present  EXTREMITIES:  1+ bipedal edema  PSYCH: AAOx3  NEUROLOGY: non-focal  SKIN: No rashes or lesions    LABS:                        11.1   9.76  )-----------( 363      ( 2020 07:06 )             35.3     11-    135  |  97  |  27<H>  ----------------------------<  91  4.0   |  24  |  1.49<H>    Ca    9.2      2020 07:06  Phos  3.3       Mg     2.2         TPro  7.2  /  Alb  4.0  /  TBili  1.0  /  DBili  x   /  AST  34  /  ALT  21  /  AlkPhos  93      PT/INR - ( 31 Oct 2020 19:56 )   PT: 15.0 sec;   INR: 1.26 ratio         PTT - ( 31 Oct 2020 19:56 )  PTT:34.0 sec      Urinalysis Basic - ( 31 Oct 2020 20:28 )    Color: Light Yellow / Appearance: Clear / S.012 / pH: x  Gluc: x / Ketone: Negative  / Bili: Negative / Urobili: Negative   Blood: x / Protein: Negative / Nitrite: Negative   Leuk Esterase: Negative / RBC: x / WBC x   Sq Epi: x / Non Sq Epi: x / Bacteria: x          RADIOLOGY & ADDITIONAL TESTS:    Imaging Personally Reviewed:    Consultant(s) Notes Reviewed:      Care Discussed with Consultants/Other Providers:

## 2020-11-01 NOTE — PATIENT PROFILE ADULT - LIVING ENVIRONMENT
Quality 226: Preventive Care And Screening: Tobacco Use: Screening And Cessation Intervention: Patient screened for tobacco use and is an ex/non-smoker
Quality 110: Preventive Care And Screening: Influenza Immunization: Influenza Immunization Administered during Influenza season
Quality 130: Documentation Of Current Medications In The Medical Record: Current Medications Documented
Detail Level: Detailed
Quality 431: Preventive Care And Screening: Unhealthy Alcohol Use - Screening: Patient screened for unhealthy alcohol use using a single question and scores less than 2 times per year
no

## 2020-11-01 NOTE — CONSULT NOTE ADULT - ASSESSMENT
89 year male, h/o HTN hyperlipidemia hypothyroidism, h/o CAD , sick sinus syndrome, post St Mo PPM, now admitted with progressive pedal edema, orthopnea,  midlly elevated BNP and possible pneumonia, improving with IV lasix and antibiotics

## 2020-11-01 NOTE — PATIENT PROFILE ADULT - ARRIVAL FROM
[facility] in [facility]/Assisted living [facility]

## 2020-11-01 NOTE — CONSULT NOTE ADULT - SUBJECTIVE AND OBJECTIVE BOX
[facility] - [facility]  CONSULT NOTE    CHIEF COMPLAINT:  shortness of breath and leg swelling    HPI:  The patient is a 89 yo male with PMH of HTN, HLD, Bradycardia s/p PPM, on aspirin and eliquis, hypothyroidism, BPH, presents here from assisted living for hypoxia.  History obtained from patient and notes, he is a good historian!  Patient states for last few days he noticed b/l LE swelling right > left.  He also has been having mild SOB, today started having some cough, unable to bring up phlegm.  He denies any fever or chills.  Also denies chest pain, orthopnea, PND.  Denies nausea, vomiting, abdominal pain, diarrhea.  CXR done there c/w pneumonia, he was given amoxicillin, but because of hypoxia of 85%, he was sent to hospital for further evaluation.  On arrival, patient's vitals showed temp 98.9, HR 70, /70, saturation 96% on 4L, now on 2L.  Patient otherwise currently denies any complaints.  His main complaint is his leg swelling.  He denies any dietary indiscretions but does get meals from assisted living.  He denies any pleuritic chest pain.  Since being in hospital overnight, he states his legs have markedly improved already.      PMHX:  H/O cardiac arrhythmia    Hypothyroidism (acquired)    BPH (benign prostatic hyperplasia)    Bradycardia    Hyperlipidemia    Hypertension        PSHX:  S/P tonsillectomy    Pacemaker        FAMILY HISTORY:  FH: heart disease  father        SOCIAL HISTORY:  Lives in assisted living.  Very distant ex smoker - while in [employer].  Retired now.      Antimicrobials:  azithromycin  IVPB 500 milliGRAM(s) IV Intermittent every 24 hours  cefTRIAXone   IVPB 1000 milliGRAM(s) IV Intermittent every 24 hours      Cardiology:  amLODIPine   Tablet 5 milliGRAM(s) Oral daily  furosemide   Injectable 40 milliGRAM(s) IV Push two times a day  isosorbide   mononitrate ER Tablet (IMDUR) 30 milliGRAM(s) Oral daily  metoprolol tartrate 50 milliGRAM(s) Oral daily      Other:  acetaminophen   Tablet .. 650 milliGRAM(s) Oral every 8 hours PRN  apixaban 2.5 milliGRAM(s) Oral every 12 hours  aspirin enteric coated 81 milliGRAM(s) Oral daily  atorvastatin 20 milliGRAM(s) Oral at bedtime  finasteride 5 milliGRAM(s) Oral daily  levothyroxine 25 MICROGram(s) Oral daily  simvastatin 10 milliGRAM(s) Oral at bedtime      Allergies    No Known Allergies    Intolerances        HOME MEDICATIONS:    REVIEW OF SYSTEMS: (Negative unless otherwise delineated)     Constitutional: No fevers, chills, sweats. weight loss, fatigue, weakness, malaise, lethargy  Eyes: No itching or discharge from the eyes, visual change, blurred vision, double vision, yellow sclerae, eye pain.  ENT: No ear pain, ear discharge, tinnitus, change in hearing, nasal congestion, runny nose, post nasal drip, epistaxis, sinus pain, sore throat, globus sensation, dental problems, oral ulcers/lesions  CV: No chest pain, chest pressure, chest discomfort, palpitations, lightheadedness/dizziness, syncope, he does have lower extremity edema, no inability to lay flat to sleep, awakening from sleep unable to sleep, claudication.  Resp: No dyspnea at rest, he does have dyspnea on exertion, no chest congestion/wheeze, mild cough, no phlegm, no stridor, sputum production, chest pain with respiration, hemoptysis  GI: No anorexia, nausea, vomiting, constipation, abdominal pain. blood in the stool, diarrhea, melena, change in bowel habits or stools, dysphagia, odynophagia, heartburn  : No burning on urination, urinary urgency, urinary frequency, urinary hesitancy, incontinence, blood in the urine, waking up at night to urinate, change in urine color, urinary retention.   Neurological: No headache, dizziness, syncope, paralysis, unsteady gait, muscle weakness, change in bowel or bladder control, numbness or tingling in the extremities, change in smell or taste, change in speech, tremor, memory change/loss, vertigo, frequent falls, confusion  MSK: No muscle pain, back pain, joint pain, joint stiffness, joint swelling   Hematologic: No anemia, bleeding, bruising, ecchymoses.   Lymphatics: No enlarged nodes, history of splenectomy  Psychiatric: No depression, anxiety, bipolar disorde, schizophrenia, hallucinations, suicidal/homicidal thoughts  Endocrinologic: No weight change, sweating, cold or heat intolerance, polyuria, polydipsia, polyphagia, abnormal hair growth, change in nails, tremor, neck pain or swelling.   Allergies: No hives, eczema, allergic rhinitis  Integumentary: No rash, new/growing/changing skin lesions, bruising, pruritis, decubiti                                                                                                                                                                                [ ]Unable to obtain    OBJECTIVE:      ICU Vital Signs Last 24 Hrs  T(C): 36.3 (2020 11:20), Max: 37.2 (31 Oct 2020 19:13)  T(F): 97.4 (:20), Max: 98.9 (31 Oct 2020 19:13)  HR: 72 (:) (69 - 76)  BP: 133/67 (:20) (130/70 - 163/70)  BP(mean): --  ABP: --  ABP(mean): --  RR: 18 (:20) (17 - 20)  SpO2: 95% (:) (92% - 97%)      I&O's Detail    31 Oct 2020 08:01  -  2020 07:00  --------------------------------------------------------  IN:    Oral Fluid: 480 mL  Total IN: 480 mL    OUT:    Voided (mL): 250 mL  Total OUT: 250 mL    Total NET: 230 mL      2020 07:01  -  2020 13:23  --------------------------------------------------------  IN:    Oral Fluid: 300 mL  Total IN: 300 mL    OUT:    Voided (mL): 280 mL  Total OUT: 280 mL    Total NET: 20 mL        Daily Height in cm: 167.64 (31 Oct 2020 19:13)    Daily   CAPILLARY BLOOD GLUCOSE          PHYSICAL EXAM:  General: Awake, alert, cooperative, no distress, appears stated age   Head: Atraumatic, normocephalic  Back: Symmetric, no curvature, range of motion normal   Chest wall: No tenderness or deformity  Respiratory: Respirations unlabored; Clear to auscultation, mild decrease in bs at the right base  Cardiovascular: Regular rate and rhythm, S1 S2 normal. No murmurs, rubs or gallops.   Abdomen: Soft, non-tender, non-distended. No organomegaly. No masses. Normal bowel sounds  Extremities: Warm to touch. No clubbing or cyanosis. 1+ pedal edema that patient states is improved.  Skin: Normal skin color, texture and turgor. No rashes or lesions  Neurological: Grossly intact A and O x 3  Psychiatry: Appropriate mood and affect.    LABS:                        11.1   9.76  )-----------( 363      ( 2020 07:06 )             35.3         135  |  97  |  27<H>  ----------------------------<  91  4.0   |  24  |  1.49<H>    Ca    9.2      2020 07:06  Phos  3.3       Mg     2.2         TPro  7.2  /  Alb  4.0  /  TBili  1.0  /  DBili  x   /  AST  34  /  ALT  21  /  AlkPhos  93      PT/INR - ( 31 Oct 2020 19:56 )   PT: 15.0 sec;   INR: 1.26 ratio         PTT - ( 31 Oct 2020 19:56 )  PTT:34.0 sec  Urinalysis Basic - ( 31 Oct 2020 20:28 )    Color: Light Yellow / Appearance: Clear / S.012 / pH: x  Gluc: x / Ketone: Negative  / Bili: Negative / Urobili: Negative   Blood: x / Protein: Negative / Nitrite: Negative   Leuk Esterase: Negative / RBC: x / WBC x   Sq Epi: x / Non Sq Epi: x / Bacteria: x      Venous Blood Gas:  10-31 @ 19:56  7.38/48///32  VBG Lactate: 1.1        MICROBIOLOGY:     RADIOLOGY:  [ x ] Reviewed and interpreted by me  darin< from: Xray Chest 1 View- PORTABLE-Urgent (10.31.20 @ 19:56) >  EXAM:  XR CHEST PORTABLE URGENT 1V                            PROCEDURE DATE:  10/31/2020      INTERPRETATION:  CLINICAL INFORMATION: Sepsis.    TECHNIQUE: Frontal radiograph of the chest.    COMPARISON: Chest x-ray dated 2013.    FINDINGS:    LUNGS and pleura: Elevation of the right hemidiaphragm that is new when compared with prior. Small right-sided pleural effusion with adjacent atelectasis. No pneumothorax.    HEART AND MEDIASTINUM: Cardiomediastinal silhouette within normal limits. There is a left-sided dual-lead pacemaker.    SKELETON: Unremarkable skeletal structures.      IMPRESSION:    Elevation of the right hemidiaphragm.  Small right-sided pleural effusion with adjacent atelectasis.    KASANDRA NEWBY MD; Resident Radiology  This document has been electronically signed.  ALTAF ESPINOZA MD; Attending Radiologist  This document has been electronically signed. 2020  8:25AM    < end of copied text >

## 2020-11-01 NOTE — CONSULT NOTE ADULT - ASSESSMENT
Impression:  H/O cardiac arrhythmia  Hypothyroidism (acquired)  BPH (benign prostatic hyperplasia)  Bradycardia s/p ppm, on asa and anticoagulation  Hyperlipidemia  Hypertension  Cough/hypoxia/shortness of breath - although pt with mild cough, mildly elevated procalcitonin, his white count is normal, does not feel sick, he likely does have a component of fluid overload, cxr is consistent with subpulmonic pleural effusion, perhaps and underlying infiltrate as well.  Hopefully will resolve with diuresis as well.  His legs are already better.    Recommendations:  Would continue ceftriaxone/zithromax at this time, day #2, await cultures, f/u procalcitonin  Continue IV lasix, as bp and kidney function allows, he is tolerating it and it is working.  Supplemental 02 to keep sats greater than 90%, will check again on room air when ready for d/c, suspect it will have improved.  Await ct chest to see if can ascertain how much effusion and whether there is an underlying infiltrate - if effusion is large, eliquis will have to be held in order to do diagnostic/therapeutic thoracentesis, if small, likely will resolve on it's own.  No need for inhalers or nebulizers at this time.  Await cardiology input.  Repeat procalcitonin in am tomorrow.  Routine GI prophylaxis.    Thank you.  We will follow with you.    Merissa Jeffries MD, [facility] - [place]  [phone]

## 2020-11-01 NOTE — CONSULT NOTE ADULT - PROBLEM SELECTOR RECOMMENDATION 9
continue diuretics IV m, continue imdur , check EF ,check echo? in-patient pharmacologic stress ? continue diuretics IV m, continue imdur , check EF ,check echo? in-patient pharmacologic stress ? mildly elevated troponin

## 2020-11-01 NOTE — CONSULT NOTE ADULT - SUBJECTIVE AND OBJECTIVE BOX
CHIEF COMPLAINT: sick sinus syndrome, shortness of breath      PAST MEDICAL & SURGICAL HISTORY:  H/O cardiac arrhythmia    Hypothyroidism (acquired)    BPH (benign prostatic hyperplasia)    Hyperlipidemia    Hypertension    S/P tonsillectomy    Pacemaker        HPI: 89 year male with history  of sick sinus syndrome, PAF, post PPm ( [facility]) pacemaker dependent , h/o HTN hypothyroidism, hyperlipidemia, CAD, now presents with 1 -2 week h/o progressive pedal edema and orthopnea, found to have right LL infiltrate and hypoxia on CXR, sent for further management, sats in 85-90% range. No chest pain , palpitations, syncope or presyncope. patient has baseline creatinine approx 1.5.    had a remote h/o retroperineal bleed with psoas muscle weakening when on heparin IV at therapeutic range in the past    started on furosemide 40 IV q12 and antibiotics with improvement    EKG demonstrates sustained AFIb ?  with V pacing, ( usually in  NSR)                PREVIOUS DIAGNOSTIC TESTING:      ECHO  FINDINGS:    STRESS  FINDINGS:    CATHETERIZATION  FINDINGS:    ELECTROPHYSIOLOGY STUDY  FINDINGS:    CAROTID ULTRASOUND:  FINDINGS    VENOUS DUPLEX SCAN:  FINDINGS:    CHEST CT PULMONARY ANGIO with IV Contrast:  FINDINGS:  MEDICATIONS  (STANDING):  amLODIPine   Tablet 5 milliGRAM(s) Oral daily  apixaban 2.5 milliGRAM(s) Oral every 12 hours  aspirin enteric coated 81 milliGRAM(s) Oral daily  atorvastatin 20 milliGRAM(s) Oral at bedtime  azithromycin  IVPB 500 milliGRAM(s) IV Intermittent every 24 hours  cefTRIAXone   IVPB 1000 milliGRAM(s) IV Intermittent every 24 hours  finasteride 5 milliGRAM(s) Oral daily  furosemide   Injectable 40 milliGRAM(s) IV Push two times a day  isosorbide   mononitrate ER Tablet (IMDUR) 30 milliGRAM(s) Oral daily  levothyroxine 25 MICROGram(s) Oral daily  metoprolol tartrate 50 milliGRAM(s) Oral daily  simvastatin 10 milliGRAM(s) Oral at bedtime    MEDICATIONS  (PRN):  acetaminophen   Tablet .. 650 milliGRAM(s) Oral every 8 hours PRN Mild Pain (1 - 3)      FAMILY HISTORY:  FH: heart disease  father        SOCIAL HISTORY:    CIGARETTES:    ALCOHOL:    REVIEW OF SYSTEMS:    CONSTITUTIONAL: No fever, weight loss, chills, shakes, or fatigue  EYES: No eye pain, visual disturbances, or discharge  ENMT:  No difficulty hearing, tinnitus, vertigo; No sinus or throat pain  NECK: No pain or stiffness  BREASTS: No pain, masses, or nipple discharge  RESPIRATORY: No cough, wheezing, hemoptysis, or shortness of breath  CARDIOVASCULAR: No chest pain,  + dyspnea,  no palpitations, dizziness, syncope, +  paroxysmal nocturnal dyspnea, orthopnea, +  leg swelling  GASTROINTESTINAL: No abdominal  or epigastric pain, nausea, vomiting, hematemesis, diarrhea, constipation, melena or bright red blood.  GENITOURINARY: No dysuria, nocturia, hematuria, or urinary incontinence  NEUROLOGICAL: No headaches, memory loss, slurred speech, limb weakness, loss of strength, numbness, or tremors  SKIN: No itching, burning, rashes, or lesions   LYMPH NODES: No enlarged glands  ENDOCRINE: No heat or cold intolerance, or hair loss  MUSCULOSKELETAL: No joint pain or swelling, muscle, back, or extremity pain  PSYCHIATRIC: No depression, anxiety, or difficulty sleeping  HEME/LYMPH: No easy bruising or bleeding gums  ALLERY AND IMMUNOLOGIC: No hives or rash.      Vital Signs Last 24 Hrs  T(C): 36.3 (2020 11:20), Max: 37.2 (31 Oct 2020 19:13)  T(F): 97.4 (2020 11:20), Max: 98.9 (31 Oct 2020 19:13)  HR: 72 (:20) (69 - 76)  BP: 133/67 (:20) (130/70 - 163/70)  BP(mean): --  RR: 18 (2020 11:20) (17 - 20)  SpO2: 95% (:20) (92% - 97%)  Daily Height in cm: 167.64 (31 Oct 2020 19:13)    Daily     10-31 @ : @ 07:00  --------------------------------------------------------  IN: 480 mL / OUT: 250 mL / NET: 230 mL     @ : @ 17:26  --------------------------------------------------------  IN: 840 mL / OUT: 580 mL / NET: 260 mL          PHYSICAL EXAM:    GENERAL: In no apparent distress, well nourished, and hydrated.  HEAD:  Atraumatic, Normocephalic  EYES: EOMI, PERRLA, conjunctiva and sclera clear  ENMT: No tonsillar erythema, exudates, or enlargement; Moist mucous membranes, Good dentition, No lesions  NECK: Supple and normal thyroid.  No JVD or carotid bruit.  Carotid pulse is 2+ bilaterally.  HEART: Regular rate and rhythm; No murmurs, rubs, or gallops.  PULMONARY: decreased breath sounds on right base  ABDOMEN: Soft, Nontender, Nondistended; Bowel sounds present  EXTREMITIES:   mild pedal edema    : No lymphadenopathy noted  NEUROLOGICAL: Grossly nonfocal          INTERPRETATION OF TELEMETRY:    ECG:    I&O's Detail    31 Oct 2020 08:01  -  2020 07:00  --------------------------------------------------------  IN:    Oral Fluid: 480 mL  Total IN: 480 mL    OUT:    Voided (mL): 250 mL  Total OUT: 250 mL    Total NET: 230 mL      2020 07:01  -  2020 17:26  --------------------------------------------------------  IN:    Oral Fluid: 840 mL  Total IN: 840 mL    OUT:    Voided (mL): 580 mL  Total OUT: 580 mL    Total NET: 260 mL          LABS:                        11.1   9.76  )-----------( 363      ( 2020 07:06 )             35.3         135  |  97  |  27<H>  ----------------------------<  91  4.0   |  24  |  1.49<H>    Ca    9.2      2020 07:06  Phos  3.3       Mg     2.2         TPro  7.2  /  Alb  4.0  /  TBili  1.0  /  DBili  x   /  AST  34  /  ALT  21  /  AlkPhos  93          PT/INR - ( 31 Oct 2020 19:56 )   PT: 15.0 sec;   INR: 1.26 ratio         PTT - ( 31 Oct 2020 19:56 )  PTT:34.0 sec  Urinalysis Basic - ( 31 Oct 2020 20:28 )    Color: Light Yellow / Appearance: Clear / S.012 / pH: x  Gluc: x / Ketone: Negative  / Bili: Negative / Urobili: Negative   Blood: x / Protein: Negative / Nitrite: Negative   Leuk Esterase: Negative / RBC: x / WBC x   Sq Epi: x / Non Sq Epi: x / Bacteria: x      BNPSerum Pro-Brain Natriuretic Peptide: 2696 pg/mL (10-31 @ 19:56)    I&O's Detail    31 Oct 2020 08:01  -  2020 07:00  --------------------------------------------------------  IN:    Oral Fluid: 480 mL  Total IN: 480 mL    OUT:    Voided (mL): 250 mL  Total OUT: 250 mL    Total NET: 230 mL      2020 07:01  -  2020 17:26  --------------------------------------------------------  IN:    Oral Fluid: 840 mL  Total IN: 840 mL    OUT:    Voided (mL): 580 mL  Total OUT: 580 mL    Total NET: 260 mL        Daily Height in cm: 167.64 (31 Oct 2020 19:13)    Daily     RADIOLOGY & ADDITIONAL STUDIES:               CHIEF COMPLAINT: sick sinus syndrome, shortness of breath      PAST MEDICAL & SURGICAL HISTORY:  H/O cardiac arrhythmia    Hypothyroidism (acquired)    BPH (benign prostatic hyperplasia)    Hyperlipidemia    Hypertension    S/P tonsillectomy    Pacemaker        HPI: 89 year male with history  of sick sinus syndrome, PAF, post PPm ( [facility]) pacemaker dependent , h/o HTN hypothyroidism, hyperlipidemia, CAD, now presents with 1 -2 week h/o progressive pedal edema and orthopnea, found to have right LL infiltrate and hypoxia on CXR, sent for further management, sats in 85-90% range. No chest pain , palpitations, syncope or presyncope. patient has baseline creatinine approx 1.5. troponin in 70s    had a remote h/o retroperineal bleed with psoas muscle weakening when on heparin IV at therapeutic range in the past    started on furosemide 40 IV q12 and antibiotics with improvement    EKG demonstrates sustained AFIb ?  with V pacing, ( usually in  NSR)                PREVIOUS DIAGNOSTIC TESTING:      ECHO  FINDINGS:    STRESS  FINDINGS:    CATHETERIZATION  FINDINGS:    ELECTROPHYSIOLOGY STUDY  FINDINGS:    CAROTID ULTRASOUND:  FINDINGS    VENOUS DUPLEX SCAN:  FINDINGS:    CHEST CT PULMONARY ANGIO with IV Contrast:  FINDINGS:  MEDICATIONS  (STANDING):  amLODIPine   Tablet 5 milliGRAM(s) Oral daily  apixaban 2.5 milliGRAM(s) Oral every 12 hours  aspirin enteric coated 81 milliGRAM(s) Oral daily  atorvastatin 20 milliGRAM(s) Oral at bedtime  azithromycin  IVPB 500 milliGRAM(s) IV Intermittent every 24 hours  cefTRIAXone   IVPB 1000 milliGRAM(s) IV Intermittent every 24 hours  finasteride 5 milliGRAM(s) Oral daily  furosemide   Injectable 40 milliGRAM(s) IV Push two times a day  isosorbide   mononitrate ER Tablet (IMDUR) 30 milliGRAM(s) Oral daily  levothyroxine 25 MICROGram(s) Oral daily  metoprolol tartrate 50 milliGRAM(s) Oral daily  simvastatin 10 milliGRAM(s) Oral at bedtime    MEDICATIONS  (PRN):  acetaminophen   Tablet .. 650 milliGRAM(s) Oral every 8 hours PRN Mild Pain (1 - 3)      FAMILY HISTORY:  FH: heart disease  father        SOCIAL HISTORY:    CIGARETTES:    ALCOHOL:    REVIEW OF SYSTEMS:    CONSTITUTIONAL: No fever, weight loss, chills, shakes, or fatigue  EYES: No eye pain, visual disturbances, or discharge  ENMT:  No difficulty hearing, tinnitus, vertigo; No sinus or throat pain  NECK: No pain or stiffness  BREASTS: No pain, masses, or nipple discharge  RESPIRATORY: No cough, wheezing, hemoptysis, or shortness of breath  CARDIOVASCULAR: No chest pain,  + dyspnea,  no palpitations, dizziness, syncope, +  paroxysmal nocturnal dyspnea, orthopnea, +  leg swelling  GASTROINTESTINAL: No abdominal  or epigastric pain, nausea, vomiting, hematemesis, diarrhea, constipation, melena or bright red blood.  GENITOURINARY: No dysuria, nocturia, hematuria, or urinary incontinence  NEUROLOGICAL: No headaches, memory loss, slurred speech, limb weakness, loss of strength, numbness, or tremors  SKIN: No itching, burning, rashes, or lesions   LYMPH NODES: No enlarged glands  ENDOCRINE: No heat or cold intolerance, or hair loss  MUSCULOSKELETAL: No joint pain or swelling, muscle, back, or extremity pain  PSYCHIATRIC: No depression, anxiety, or difficulty sleeping  HEME/LYMPH: No easy bruising or bleeding gums  ALLERY AND IMMUNOLOGIC: No hives or rash.      Vital Signs Last 24 Hrs  T(C): 36.3 (2020 11:20), Max: 37.2 (31 Oct 2020 19:13)  T(F): 97.4 (2020 11:20), Max: 98.9 (31 Oct 2020 19:13)  HR: 72 (2020 11:20) (69 - 76)  BP: 133/67 (2020 11:20) (130/70 - 163/70)  BP(mean): --  RR: 18 (2020 11:20) (17 - 20)  SpO2: 95% (:20) (92% - 97%)  Daily Height in cm: 167.64 (31 Oct 2020 19:13)    Daily     10-31 @  @ 07:00  --------------------------------------------------------  IN: 480 mL / OUT: 250 mL / NET: 230 mL     @ : @ 17:26  --------------------------------------------------------  IN: 840 mL / OUT: 580 mL / NET: 260 mL          PHYSICAL EXAM:    GENERAL: In no apparent distress, well nourished, and hydrated.  HEAD:  Atraumatic, Normocephalic  EYES: EOMI, PERRLA, conjunctiva and sclera clear  ENMT: No tonsillar erythema, exudates, or enlargement; Moist mucous membranes, Good dentition, No lesions  NECK: Supple and normal thyroid.  No JVD or carotid bruit.  Carotid pulse is 2+ bilaterally.  HEART: Regular rate and rhythm; No murmurs, rubs, or gallops.  PULMONARY: decreased breath sounds on right base  ABDOMEN: Soft, Nontender, Nondistended; Bowel sounds present  EXTREMITIES:   mild pedal edema    : No lymphadenopathy noted  NEUROLOGICAL: Grossly nonfocal          INTERPRETATION OF TELEMETRY:    ECG:    I&O's Detail    31 Oct 2020 08:  -  2020 07:00  --------------------------------------------------------  IN:    Oral Fluid: 480 mL  Total IN: 480 mL    OUT:    Voided (mL): 250 mL  Total OUT: 250 mL    Total NET: 230 mL      2020 07:  -  2020 17:26  --------------------------------------------------------  IN:    Oral Fluid: 840 mL  Total IN: 840 mL    OUT:    Voided (mL): 580 mL  Total OUT: 580 mL    Total NET: 260 mL          LABS:                        11.1   9.76  )-----------( 363      ( 2020 07:06 )             35.3         135  |  97  |  27<H>  ----------------------------<  91  4.0   |  24  |  1.49<H>    Ca    9.2      2020 07:06  Phos  3.3       Mg     2.2         TPro  7.2  /  Alb  4.0  /  TBili  1.0  /  DBili  x   /  AST  34  /  ALT  21  /  AlkPhos  93          PT/INR - ( 31 Oct 2020 19:56 )   PT: 15.0 sec;   INR: 1.26 ratio         PTT - ( 31 Oct 2020 19:56 )  PTT:34.0 sec  Urinalysis Basic - ( 31 Oct 2020 20:28 )    Color: Light Yellow / Appearance: Clear / S.012 / pH: x  Gluc: x / Ketone: Negative  / Bili: Negative / Urobili: Negative   Blood: x / Protein: Negative / Nitrite: Negative   Leuk Esterase: Negative / RBC: x / WBC x   Sq Epi: x / Non Sq Epi: x / Bacteria: x      BNPSerum Pro-Brain Natriuretic Peptide: 2696 pg/mL (10-31 @ 19:56)    I&O's Detail    31 Oct 2020 08:01  -  2020 07:00  --------------------------------------------------------  IN:    Oral Fluid: 480 mL  Total IN: 480 mL    OUT:    Voided (mL): 250 mL  Total OUT: 250 mL    Total NET: 230 mL      2020 07:01  -  2020 17:26  --------------------------------------------------------  IN:    Oral Fluid: 840 mL  Total IN: 840 mL    OUT:    Voided (mL): 580 mL  Total OUT: 580 mL    Total NET: 260 mL        Daily Height in cm: 167.64 (31 Oct 2020 19:13)    Daily     RADIOLOGY & ADDITIONAL STUDIES:

## 2020-11-01 NOTE — PROGRESS NOTE ADULT - ASSESSMENT
89 yo male with PMH of HTN, HLD, Bradycardia s/p PPM, on aspirin and eliquis, hypothyroidism, BPH, presents here from assisted living for hypoxia.     Acute respiratory failure with hypoxia.  - Possibly 2/2 to pneumonia vs. CHF exacerbation  - continue IV ceftriaxone, azithromycin  - check urine legionella  - blood culture  - check echocardiogram  - c/w IV diuretics.   - CT chest  - Pulmonary evaluation    CHF exacerbation.   - Patient with right pleural effusion, JVD, b/l LE edema, elevated BNP  - Patient has elevated troponin, possibly 2/2 to CHF vs. RJ  - continue IV Lasix 40mg q12 for now  - echocardiogram- Cardiology evaluation    Hypothyroidism (acquired).  - c/w synthroid.  - TSH     Hypertension.  - c/w isosorbide mononitrate, metoprolol, Norvasc Lasix     Hyperlipidemia.   - c/w atorvastatin.    BPH (benign prostatic hyperplasia).   - c/w finasteride.     Leg edema.  - b/l LE edema, likely 2/2 to heart failure, but will r/o DVT  - VA duplex.     H/O cardiac arrhythmia.    - ? hx of arrhythmia s/p PPM  - need pacemaker interrogation  - c/w Eliquis and aspirin.     Prophylactic measure.  - on Eliquis    Kofi Sanchez MD pager [phone]

## 2020-11-02 DIAGNOSIS — I49.5 SICK SINUS SYNDROME: ICD-10-CM

## 2020-11-02 LAB — LEGIONELLA AG UR QL: NEGATIVE — SIGNIFICANT CHANGE UP

## 2020-11-02 PROCEDURE — 93970 EXTREMITY STUDY: CPT | Mod: 26

## 2020-11-02 PROCEDURE — 93288 INTERROG EVL PM/LDLS PM IP: CPT | Mod: 26

## 2020-11-02 RX ORDER — METOPROLOL TARTRATE 50 MG
50 TABLET ORAL DAILY
Refills: 0 | Status: DISCONTINUED | OUTPATIENT
Start: 2020-11-03 | End: 2020-11-04

## 2020-11-02 RX ORDER — FUROSEMIDE 40 MG
40 TABLET ORAL
Refills: 0 | Status: DISCONTINUED | OUTPATIENT
Start: 2020-11-03 | End: 2020-11-04

## 2020-11-02 RX ADMIN — AMLODIPINE BESYLATE 5 MILLIGRAM(S): 2.5 TABLET ORAL at 05:10

## 2020-11-02 RX ADMIN — Medication 40 MILLIGRAM(S): at 05:10

## 2020-11-02 RX ADMIN — APIXABAN 2.5 MILLIGRAM(S): 2.5 TABLET, FILM COATED ORAL at 18:18

## 2020-11-02 RX ADMIN — APIXABAN 2.5 MILLIGRAM(S): 2.5 TABLET, FILM COATED ORAL at 05:10

## 2020-11-02 RX ADMIN — Medication 81 MILLIGRAM(S): at 13:34

## 2020-11-02 RX ADMIN — ATORVASTATIN CALCIUM 20 MILLIGRAM(S): 80 TABLET, FILM COATED ORAL at 21:49

## 2020-11-02 RX ADMIN — CEFTRIAXONE 100 MILLIGRAM(S): 500 INJECTION, POWDER, FOR SOLUTION INTRAMUSCULAR; INTRAVENOUS at 21:49

## 2020-11-02 RX ADMIN — Medication 50 MILLIGRAM(S): at 05:10

## 2020-11-02 RX ADMIN — Medication 40 MILLIGRAM(S): at 18:21

## 2020-11-02 RX ADMIN — ISOSORBIDE MONONITRATE 30 MILLIGRAM(S): 60 TABLET, EXTENDED RELEASE ORAL at 13:34

## 2020-11-02 RX ADMIN — Medication 25 MICROGRAM(S): at 05:10

## 2020-11-02 RX ADMIN — AZITHROMYCIN 250 MILLIGRAM(S): 500 TABLET, FILM COATED ORAL at 22:29

## 2020-11-02 RX ADMIN — FINASTERIDE 5 MILLIGRAM(S): 5 TABLET, FILM COATED ORAL at 13:34

## 2020-11-02 RX ADMIN — SIMVASTATIN 10 MILLIGRAM(S): 20 TABLET, FILM COATED ORAL at 21:50

## 2020-11-02 NOTE — PROCEDURE NOTE - ADDITIONAL PROCEDURE DETAILS
Implanting: Dr. GABBY Barrientos  Date of Implant: 03/07/2019  Indication: N/A               A Lead: 1888TC; 02/24/2011  A (%) paced: 25.0  RV Lead: 4285; 06/13/1996  RV (%) paced: > 99.0    AT/AF [place] [facility] (%): 22.0  AT/AF [place] [facility] Histogram: ~June/July 2020 at 100%  Mode switch (%): 32.0  Presenting Rhythm: AT/AFL, V pacing 110s briefly, then V pacing 70s  Dependent: No  Events/Observations: Multiple AMS entries with V rates in the 90-100s  Parameter Changes: Mode changed from DDDR 70/110 to VVIR 70/120 & V output changed from 3.5V@0.5ms to 2.5V@0.5    Impression/Plan: Normal device function. Normal sensing and RV pacing via iterative testing. Normal lead impedance(s) and RV capture threshold(s). Increased AT/AF burden since the end of June 2020. Discussed findings with EP, Dr. GABBY Barrientos.

## 2020-11-02 NOTE — PROGRESS NOTE ADULT - SUBJECTIVE AND OBJECTIVE BOX
Patient is a 89y old  Male who presents with a chief complaint of hypoxia (2020 17:26)      SUBJECTIVE / OVERNIGHT EVENTS: Comfortable without new complaints.   Review of Systems  chest pain no  palpitations no  sob improving   nausea no  headache no    MEDICATIONS  (STANDING):  amLODIPine   Tablet 5 milliGRAM(s) Oral daily  apixaban 2.5 milliGRAM(s) Oral every 12 hours  aspirin enteric coated 81 milliGRAM(s) Oral daily  atorvastatin 20 milliGRAM(s) Oral at bedtime  azithromycin  IVPB 500 milliGRAM(s) IV Intermittent every 24 hours  cefTRIAXone   IVPB 1000 milliGRAM(s) IV Intermittent every 24 hours  finasteride 5 milliGRAM(s) Oral daily  furosemide   Injectable 40 milliGRAM(s) IV Push two times a day  isosorbide   mononitrate ER Tablet (IMDUR) 30 milliGRAM(s) Oral daily  levothyroxine 25 MICROGram(s) Oral daily  metoprolol tartrate 50 milliGRAM(s) Oral daily  simvastatin 10 milliGRAM(s) Oral at bedtime    MEDICATIONS  (PRN):  acetaminophen   Tablet .. 650 milliGRAM(s) Oral every 8 hours PRN Mild Pain (1 - 3)      Vital Signs Last 24 Hrs  T(C): 36.3 (2020 18:12), Max: 37 (2020 04:54)  T(F): 97.4 (2020 18:12), Max: 98.6 (2020 04:54)  HR: 60 (2020 18:12) (60 - 76)  BP: 143/67 (2020 18:12) (123/66 - 143/67)  BP(mean): --  RR: 19 (2020 18:12) (17 - 19)  SpO2: 94% (2020 18:12) (91% - 95%)    PHYSICAL EXAM:  GENERAL: NAD, well-developed  HEAD:  Atraumatic, Normocephalic  EYES: EOMI, PERRLA, conjunctiva and sclera clear  NECK: Supple, No JVD  CHEST/LUNG: Clear to auscultation bilaterally; No wheeze  HEART: Regular rate and rhythm; No murmurs, rubs, or gallops  ABDOMEN: Soft, Nontender, Nondistended; Bowel sounds present  EXTREMITIES: 1- 2+ bipedal edema  PSYCH: AAOx3  NEUROLOGY: non-focal  SKIN: No rashes or lesions    LABS:                        11.1   9.76  )-----------( 363      ( 2020 07:06 )             35.3     11    135  |  97  |  27<H>  ----------------------------<  91  4.0   |  24  |  1.49<H>    Ca    9.2      2020 07:06  Phos  3.3       Mg     2.2         TPro  7.2  /  Alb  4.0  /  TBili  1.0  /  DBili  x   /  AST  34  /  ALT  21  /  AlkPhos  93  11    PT/INR - ( 31 Oct 2020 19:56 )   PT: 15.0 sec;   INR: 1.26 ratio         PTT - ( 31 Oct 2020 19:56 )  PTT:34.0 sec      Urinalysis Basic - ( 31 Oct 2020 20:28 )    Color: Light Yellow / Appearance: Clear / S.012 / pH: x  Gluc: x / Ketone: Negative  / Bili: Negative / Urobili: Negative   Blood: x / Protein: Negative / Nitrite: Negative   Leuk Esterase: Negative / RBC: x / WBC x   Sq Epi: x / Non Sq Epi: x / Bacteria: x        Culture - Urine (collected 2020 02:06)  Source: .Urine Clean Catch (Midstream)  Final Report (2020 21:00):    <10,000 CFU/mL Normal Urogenital Miim    Culture - Blood (collected 31 Oct 2020 22:12)  Source: .Blood Blood-Peripheral  Preliminary Report (2020 22:02):    No growth to date.    Culture - Blood (collected 31 Oct 2020 22:12)  Source: .Blood Blood-Peripheral  Preliminary Report (2020 22:02):    No growth to date.        RADIOLOGY & ADDITIONAL TESTS:    Imaging Personally Reviewed:    Consultant(s) Notes Reviewed:      Care Discussed with Consultants/Other Providers:

## 2020-11-02 NOTE — PROGRESS NOTE ADULT - ASSESSMENT
89 yo male with PMH of HTN, HLD, Bradycardia s/p PPM, on aspirin and eliquis, hypothyroidism, BPH, presents here from assisted living for hypoxia.     Acute respiratory failure with hypoxia.  - Possibly 2/2 to pneumonia vs. CHF exacerbation  - continue IV ceftriaxone, azithromycin  - check urine legionella  - check echocardiogram  - c/w IV diuretics.   - Pulmonary evaluation noted    CHF exacerbation.   - Patient with right pleural effusion, JVD, b/l LE edema, elevated BNP  - Patient has elevated troponin, possibly 2/2 to CHF vs. RJ  - continue IV Lasix 40mg q12 for now  - echocardiogram- Cardiology evaluation noted    Hypothyroidism (acquired).  - c/w synthroid.    Hypertension.  - c/w isosorbide mononitrate, metoprolol, Norvasc Lasix     Hyperlipidemia.   - c/w atorvastatin.    BPH (benign prostatic hyperplasia).   - c/w finasteride.     Leg edema.  - b/l LE edema, likely 2/2 to heart failure  - VA duplex no DVT    H/O cardiac arrhythmia.    - ? hx of arrhythmia s/p PPM  - pacemaker interrogation  - c/w Eliquis and aspirin.     Prophylactic measure.  - on Eliquis    PT    Kofi Sanchez MD pager [phone]

## 2020-11-02 NOTE — PROGRESS NOTE ADULT - SUBJECTIVE AND OBJECTIVE BOX
`[facility] - PROGRESS NOTE    CHIEF COMPLAINT: hypoxemia; pleural effusions; atelectasis; lung nodule    INTERVAL HISTORY: no shortness of breath or hypoxemia sitting in the chair on room air; decreased lower extremity swelling right > left; no cough, sputum production, chest congestion or wheeze; no fevers, chills or sweats; no chest pain/pressure or palpitations    REVIEW OF SYSTEMS:  Constitutional: As per interval history  HEENT: Within normal limits  CV: As per interval history  Resp: As per interval history  GI: Within normal limits   : Within normal limits  Musculoskeletal: Within normal limits  Skin: Within normal limits  Neurological: Within normal limits  Psychiatric: Within normal limits  Endocrine: Within normal limits  Hematologic/Lymphatic: Within normal limits  Allergic/Immunologic: Within normal limits      MEDICATIONS:     Pulmonary "    Anti-microbials:  azithromycin  IVPB 500 milliGRAM(s) IV Intermittent every 24 hours  cefTRIAXone   IVPB 1000 milliGRAM(s) IV Intermittent every 24 hours    Cardiovascular:  amLODIPine   Tablet 5 milliGRAM(s) Oral daily  furosemide   Injectable 40 milliGRAM(s) IV Push two times a day  isosorbide   mononitrate ER Tablet (IMDUR) 30 milliGRAM(s) Oral daily  metoprolol tartrate 50 milliGRAM(s) Oral daily    Other:  apixaban 2.5 milliGRAM(s) Oral every 12 hours  aspirin enteric coated 81 milliGRAM(s) Oral daily  atorvastatin 20 milliGRAM(s) Oral at bedtime  finasteride 5 milliGRAMeliquisssss(s) Oral daily  levothyroxine 25 MICROGram(s) Oral daily  simvastatin 10 milliGRAM(s) Oral at bedtime    MEDICATIONS  (PRN):  acetaminophen   Tablet .. 650 milliGRAM(s) Oral every 8 hours PRN Mild Pain (1 - 3)        OBJECTIVE:    I&O's Detail    2020 07:  -  2020 07:00  --------------------------------------------------------  IN:    IV PiggyBack: 300 mL    Oral Fluid: 1320 mL  Total IN: 1620 mL    OUT:    Voided (mL): 1870 mL  Total OUT: 1870 mL    Total NET: -250 mL      2020 07:01  -  2020 12:04  --------------------------------------------------------  IN:    Oral Fluid: 360 mL  Total IN: 360 mL    OUT:    Voided (mL): 300 mL  Total OUT: 300 mL    Total NET: 60 mL      Daily Weight in k.5 (2020 07:58)    PHYSICAL EXAM:       ICU Vital Signs Last 24 Hrs  T(C): 36.6 (2020 11:20), Max: 37 (2020 04:54)  T(F): 97.9 (:20), Max: 98.6 (2020 04:54)  HR: 74 (2020 11:20) (70 - 76)  BP: 123/66 (2020 11:20) (123/66 - 132/68)  BP(mean): --  ABP: --  ABP(mean): --  RR: 17 (2020 11:20) (17 - 18)  SpO2: 91% (2020 11:20) (91% - 95%) on room air     General: Awake. Alert. Cooperative. No distress. Appears stated age.	  HEENT: Atraumatic. Normocephalic. Anicteric. Normal oral mucosa. PERRL. EOMI.  Neck: Supple. Trachea midline. Thyroid without enlargement/tenderness/nodules. No carotid bruit. No JVD.	  Cardiovascular: Regular rate and rhythm. S1 S2 normal. No murmurs, rubs or gallops.  Respiratory: Respirations unlabored. Decreased breath sounds at the bases with dullness to percussion. No curvature.  Abdomen: Soft. Non-tender. Non-distended. No organomegaly. No masses. Normal bowel sounds.  Extremities: Warm to touch. No clubbing or cyanosis. Mild pretibial edema right > left. Lower extremity venous stasis changes  Pulses: 2+ peripheral pulses all extremities.	  Skin: Normal skin color. No rashes or lesions. No ecchymoses. No cyanosis. Warm to touch.  Lymph Nodes: Cervical, supraclavicular and axillary nodes normal  Neurological: Motor and sensory examination equal and normal. A and O x 3  Psychiatry: Appropriate mood and affect.    LABS:                          11.1   9.76  )-----------( 363      ( 2020 07:06 )             35.3     CBC    WBC  9.76 <==, 8.25 <==    Hemoglobin  11.1 <<==, 10.8 <<==    Hematocrit  35.3 <==, 33.9 <==    Platelets  363 <==, 354 <==      135  |  97  |  27<H>  ----------------------------<  91    11-01  4.0   |  24  |  1.49<H>      LYTES    sodium  135 <==, 135 <==    potassium   4.0 <==, 4.4 <==    chloride  97 <==, 99 <==    carbon dioxide  24 <==, 25 <==    =============================================================================================  RENAL FUNCTION:    Creatinine:   1.49  <<==, 1.74  <<==    BUN:   27 <==, 31 <==    ============================================================================================    calcium   9.2 <==, 8.9 <==    phos   3.3 <==    mag   2.2 <==    ============================================================================================  LFTs    AST:   34 <== , 31 <==     ALT:  21  <== , 18  <==     AP:  93  <=, 89  <=    Bili:  1.0  <=, 0.8  <=      PT/INR - ( 31 Oct 2020 19:56 )   PT: 15.0 sec;   INR: 1.26 ratio       PTT - ( 31 Oct 2020 19:56 )  PTT:34.0 sec    Venous Blood Gas:  10-31 @ 19:56  7.38/48/25/28/32  VBG Lactate: 1.1    Procalcitonin, Serum: 0.15 ng/mL ( @ 07:06)    Serum Pro-Brain Natriuretic Peptide: 2696 pg/mL (10-31 @ 19:56)    CARDIAC MARKERS ( 2020 07:06 )  CPK x     /CKMB x     /CKMB Units x        troponin 71 ng/L    CARDIAC MARKERS ( 2020 00:27 )  CPK x     /CKMB x     /CKMB Units x        troponin 70 ng/L    CARDIAC MARKERS ( 31 Oct 2020 19:56 )  CPK x     /CKMB x     /CKMB Units x        troponin 72 ng/L      MICROBIOLOGY:   Urinalysis Basic - ( 31 Oct 2020 20:28 )    Color: Light Yellow / Appearance: Clear / S.012 / pH: x  Gluc: x / Ketone: Negative  / Bili: Negative / Urobili: Negative   Blood: x / Protein: Negative / Nitrite: Negative   Leuk Esterase: Negative / RBC: x / WBC x   Sq Epi: x / Non Sq Epi: x / Bacteria: x    Culture - Urine (20 @ 02:06)   Specimen Source: .Urine Clean Catch (Midstream)   Culture Results:   <10,000 CFU/mL Normal Urogenital Mimi   ---------------------------------------------------------------------------------------------------------------  Culture - Blood (10.31.20 @ 22:12)   Specimen Source: .Blood Blood-Peripheral   Culture Results:   No growth to date.   ---------------------------------------------------------------------------------------------------------------  Culture - Blood (10.31.20 @ 22:12)   Specimen Source: .Blood Blood-Peripheral   Culture Results:   No growth to date.   ---------------------------------------------------------------------------------------------------------------  RADIOLOGY:  [x] Chest radiographs reviewed and interpreted by me    < from: Xray Chest 1 View- PORTABLE-Urgent (10.31.20 @ 19:56) >    EXAM:  XR CHEST PORTABLE URGENT 1V                          PROCEDURE DATE:  10/31/2020      INTERPRETATION:  CLINICAL INFORMATION: Sepsis.    TECHNIQUE: Frontal radiograph of the chest.    COMPARISON: Chest x-ray dated 2013.    FINDINGS:    LUNGS and pleura: Elevation of the right hemidiaphragm that is new when compared with prior. Small right-sided pleural effusion with adjacent atelectasis. No pneumothorax.    HEART AND MEDIASTINUM: Cardiomediastinal silhouette within normal limits. There is a left-sided dual-lead pacemaker.    SKELETON: Unremarkable skeletal structures.      IMPRESSION:    Elevation of the right hemidiaphragm.  Small right-sided pleural effusion with adjacent atelectasis.      KASANDRA NEWBY MD; Resident Radiology  This document has been electronically signed.  ALTAF ESPINOZA MD; Attending Radiologist  This document has been electronically signed. 2020  8:25AM    < end of copied text >  ---------------------------------------------------------------------------------------------------------------    < from: CT Chest No Cont (20 @ 13:24) >    EXAM:  CT CHEST                          PROCEDURE DATE:  2020      INTERPRETATION:  CLINICAL INDICATION: Shortness of breath, CHF, coronary artery disease.    Axial CT images of the chest are obtained without intravenous administration of contrast.    No prior chest CTs are available for comparison.    Left cardiac device with the leads terminating within the right atrium and right ventricle.    No enlarged axillary, mediastinal or hilar lymph nodes. Multiple subcentimeter nonspecific mediastinal lymph nodes. Cardiomegaly. No pericardial effusion. Diffuse atherosclerotic disease with involvement of the aorta and the coronary arteries. Aortic valve calcifications.    Evaluation of the upper abdomen demonstrate nodularity of the hepatic surface may represent cirrhosis.    Small bilateral pleural effusions, right greater than left containing areas of loculation on the right.    Evaluation of the lung parenchyma demonstrate bilateral interlobular septal thickening, right greater than left suggestive of pulmonary edema. Right lower lobe partial compressive atelectasis. 2 mm bilateral calcified granulomas.    Left lower lobe peripheral curvilinear opacity, part of which measures about 2.1 x 1.2 cm on image 392 of series 4 with some associated calcification. Some surrounding left lower lobe parenchymal lucency is noted which can be seen in the setting of pulmonary air entrapment.    No central endobronchial lesions. Secretions within the trachea.    Degenerative changes of the spine and the shoulders.    IMPRESSION: Small bilateral pleural effusions, right greater than left with interlobular septal thickening suggestive of pulmonary edema. Cardiomegaly with coronary artery atherosclerotic disease.    Right lower lobe partial compressive atelectasis. Please note evaluation of the atelectatic portion of the right lower lobe is limited due to lack of intravenous contrast.    Left lower lobe peripheral 2.1 x 1.2 cm curvilinear nodule is of unclear etiology may be due to impacted dilated airway given surrounding lung parenchymal lucency.    3 month follow-up chest CT is recommended for further evaluation of the above findings.    TRISTAN REA MD; Attending Radiologist  This document has been electronically signed. 2020  1:46PM    < end of copied text >  ---------------------------------------------------------------------------------------------------------------

## 2020-11-02 NOTE — PROGRESS NOTE ADULT - SUBJECTIVE AND OBJECTIVE BOX
INTERVAL HPI/OVERNIGHT EVENTS: patient less short of breath, edema improved.     MEDICATIONS  (STANDING):  amLODIPine   Tablet 5 milliGRAM(s) Oral daily  apixaban 2.5 milliGRAM(s) Oral every 12 hours  aspirin enteric coated 81 milliGRAM(s) Oral daily  atorvastatin 20 milliGRAM(s) Oral at bedtime  azithromycin  IVPB 500 milliGRAM(s) IV Intermittent every 24 hours  cefTRIAXone   IVPB 1000 milliGRAM(s) IV Intermittent every 24 hours  finasteride 5 milliGRAM(s) Oral daily  isosorbide   mononitrate ER Tablet (IMDUR) 30 milliGRAM(s) Oral daily  levothyroxine 25 MICROGram(s) Oral daily  simvastatin 10 milliGRAM(s) Oral at bedtime    MEDICATIONS  (PRN):  acetaminophen   Tablet .. 650 milliGRAM(s) Oral every 8 hours PRN Mild Pain (1 - 3)      Allergies    No Known Allergies    Intolerances      ROS:  General: Pt denies recent weight loss/fever/chills    Neurological: denies numbness or  sensation loss    Cardiovascular: denies chest pain/palpitations/leg edema    Respiratory and Thorax: denies SOB/cough/wheezing    Gastrointestinal: denies abdominal pain/diarrhea/constipation/bloody stool    Genitourinary: denies urinary frequency/urgency/ dysuria    Musculoskeletal: denies joint pain or swelling, denies restricted motion    Hematologic: denies abnormal bleeding  	    	  	    		        	    	            Vital Signs Last 24 Hrs  T(C): 36.6 (2020 19:50), Max: 37 (2020 04:54)  T(F): 97.8 (2020 19:50), Max: 98.6 (2020 04:54)  HR: 62 (2020 19:50) (60 - 76)  BP: 120/62 (2020 19:50) (120/62 - 143/67)  BP(mean): --  RR: 18 (2020 19:50) (17 - 19)  SpO2: 93% (2020 19:50) (91% - 95%)  Daily     Daily Weight in k.5 (2020 07:58)     @ 07:01  -   @ 07:00  --------------------------------------------------------  IN: 1620 mL / OUT: 1870 mL / NET: -250 mL     @ 07:01  -   @ 20:36  --------------------------------------------------------  IN: 1400 mL / OUT: 1575 mL / NET: -175 mL      Physical Exam: wdwn male  JVD improved   cor RRR  lung clear  abd soft   ext decreased edema      LABS:                        11.1   9.76  )-----------( 363      ( 2020 07:06 )             35.3         135  |  97  |  27<H>  ----------------------------<  91  4.0   |  24  |  1.49<H>    Ca    9.2      2020 07:06  Phos  3.3       Mg     2.2         TPro  7.2  /  Alb  4.0  /  TBili  1.0  /  DBili  x   /  AST  34  /  ALT  21  /  AlkPhos  93            RADIOLOGY & ADDITIONAL TESTS:    TELE:    EKG:

## 2020-11-03 LAB
ANION GAP SERPL CALC-SCNC: 14 MMOL/L — SIGNIFICANT CHANGE UP (ref 5–17)
BUN SERPL-MCNC: 25 MG/DL — HIGH (ref 7–23)
CALCIUM SERPL-MCNC: 9.1 MG/DL — SIGNIFICANT CHANGE UP (ref 8.4–10.5)
CHLORIDE SERPL-SCNC: 92 MMOL/L — LOW (ref 96–108)
CO2 SERPL-SCNC: 27 MMOL/L — SIGNIFICANT CHANGE UP (ref 22–31)
CREAT SERPL-MCNC: 1.55 MG/DL — HIGH (ref 0.5–1.3)
GLUCOSE SERPL-MCNC: 85 MG/DL — SIGNIFICANT CHANGE UP (ref 70–99)
HCT VFR BLD CALC: 35.7 % — LOW (ref 39–50)
HGB BLD-MCNC: 11.5 G/DL — LOW (ref 13–17)
MCHC RBC-ENTMCNC: 30.4 PG — SIGNIFICANT CHANGE UP (ref 27–34)
MCHC RBC-ENTMCNC: 32.2 GM/DL — SIGNIFICANT CHANGE UP (ref 32–36)
MCV RBC AUTO: 94.4 FL — SIGNIFICANT CHANGE UP (ref 80–100)
NRBC # BLD: 0 /100 WBCS — SIGNIFICANT CHANGE UP (ref 0–0)
PLATELET # BLD AUTO: 368 K/UL — SIGNIFICANT CHANGE UP (ref 150–400)
POTASSIUM SERPL-MCNC: 3.3 MMOL/L — LOW (ref 3.5–5.3)
POTASSIUM SERPL-SCNC: 3.3 MMOL/L — LOW (ref 3.5–5.3)
RBC # BLD: 3.78 M/UL — LOW (ref 4.2–5.8)
RBC # FLD: 16.8 % — HIGH (ref 10.3–14.5)
SODIUM SERPL-SCNC: 133 MMOL/L — LOW (ref 135–145)
T4 FREE SERPL-MCNC: 1.2 NG/DL — SIGNIFICANT CHANGE UP (ref 0.9–1.8)
TSH SERPL-MCNC: 9.19 UIU/ML — HIGH (ref 0.27–4.2)
WBC # BLD: 8.82 K/UL — SIGNIFICANT CHANGE UP (ref 3.8–10.5)
WBC # FLD AUTO: 8.82 K/UL — SIGNIFICANT CHANGE UP (ref 3.8–10.5)

## 2020-11-03 PROCEDURE — 93306 TTE W/DOPPLER COMPLETE: CPT | Mod: 26

## 2020-11-03 RX ORDER — POTASSIUM CHLORIDE 20 MEQ
20 PACKET (EA) ORAL
Refills: 0 | Status: COMPLETED | OUTPATIENT
Start: 2020-11-03 | End: 2020-11-04

## 2020-11-03 RX ORDER — LEVOTHYROXINE SODIUM 125 MCG
50 TABLET ORAL DAILY
Refills: 0 | Status: DISCONTINUED | OUTPATIENT
Start: 2020-11-03 | End: 2020-11-09

## 2020-11-03 RX ORDER — AMLODIPINE BESYLATE 2.5 MG/1
2.5 TABLET ORAL DAILY
Refills: 0 | Status: DISCONTINUED | OUTPATIENT
Start: 2020-11-04 | End: 2020-11-04

## 2020-11-03 RX ADMIN — ISOSORBIDE MONONITRATE 30 MILLIGRAM(S): 60 TABLET, EXTENDED RELEASE ORAL at 13:17

## 2020-11-03 RX ADMIN — ATORVASTATIN CALCIUM 20 MILLIGRAM(S): 80 TABLET, FILM COATED ORAL at 21:13

## 2020-11-03 RX ADMIN — Medication 20 MILLIEQUIVALENT(S): at 21:13

## 2020-11-03 RX ADMIN — Medication 81 MILLIGRAM(S): at 13:17

## 2020-11-03 RX ADMIN — FINASTERIDE 5 MILLIGRAM(S): 5 TABLET, FILM COATED ORAL at 13:17

## 2020-11-03 RX ADMIN — SIMVASTATIN 10 MILLIGRAM(S): 20 TABLET, FILM COATED ORAL at 21:13

## 2020-11-03 RX ADMIN — APIXABAN 2.5 MILLIGRAM(S): 2.5 TABLET, FILM COATED ORAL at 05:18

## 2020-11-03 RX ADMIN — Medication 50 MILLIGRAM(S): at 05:20

## 2020-11-03 RX ADMIN — Medication 25 MICROGRAM(S): at 05:18

## 2020-11-03 RX ADMIN — Medication 40 MILLIGRAM(S): at 18:14

## 2020-11-03 RX ADMIN — AMLODIPINE BESYLATE 5 MILLIGRAM(S): 2.5 TABLET ORAL at 05:18

## 2020-11-03 RX ADMIN — APIXABAN 2.5 MILLIGRAM(S): 2.5 TABLET, FILM COATED ORAL at 18:14

## 2020-11-03 RX ADMIN — Medication 20 MILLIEQUIVALENT(S): at 23:06

## 2020-11-03 RX ADMIN — Medication 40 MILLIGRAM(S): at 05:18

## 2020-11-03 NOTE — PHARMACOTHERAPY INTERVENTION NOTE - COMMENTS
recommend to dc antibiotics if patient has no active infection/pna - per pulm recs - observe off abx

## 2020-11-03 NOTE — PROGRESS NOTE ADULT - ASSESSMENT
ASSESSMENT:    dyspnea/hypoxemia due to exacerbation of chronic CHF - ECHO with hyperdynamic LV, mild-moderate pulmonary hypertension and no significant valvular heart disease    abnormal chest CT  1) small bilateral pleural effusions right > left with areas of loculation on the right and associated atelectasis  2) bilateral interlobular septal thickening suggestive of pulmonary edema  3) left lower lobe peripheral opacity with some associated calcification suggesting chronicity - lucent area of surrounding lung parenchyma suggestive of air trapping -> suspect mucous plugging rather than neoplasm  4) while it is impossible to definitively "rule out" pneumonia within the atelectatic lung, I believe that the patient is without infection in the absence of leukocytosis, fever, an elevated procalcitonin level or positive cultures    PLAN/RECOMMENDATIONS:    stable oxygenation on a nasal canula - trial off supplemental oxygen  observe off antibiotics  no indication for a thoracentesis  observe off pulmonary medications  follow-up CT scan in 3 months to reevaluate the pulmonary "nodule"  cardiology follow-up  diurese as tolerated by renal function and hemodynamics  cardiac meds: ASA/eliquis/lipitor/zocor/norvasc/imdur/lopressor/lasix  await stress test      Will follow with you. Plan of care discussed with the patient at bedside and the dedicated floor NP. Hopeful discharge in next 24 - 48 hours    Aneesh Salter MD, [facility]  [phone]  Pulmonary Medicine

## 2020-11-03 NOTE — PHYSICAL THERAPY INITIAL EVALUATION ADULT - IMPAIRMENTS FOUND, PT EVAL
gait, locomotion, and balance/muscle strength/sensory integrity/L foot drop foot/aerobic capacity/endurance

## 2020-11-03 NOTE — PROGRESS NOTE ADULT - SUBJECTIVE AND OBJECTIVE BOX
[facility] - [facility] - PROGRESS NOTE    CHIEF COMPLAINT: hypoxemia; pleural effusions; atelectasis; lung nodule    INTERVAL HISTORY: no shortness of breath or hypoxemia sitting in the chair on a nasal canula; decreased lower extremity swelling right > left; no cough, sputum production, chest congestion or wheeze; no fevers, chills or sweats; no chest pain/pressure or palpitations      REVIEW OF SYSTEMS:  Constitutional: As per interval history  HEENT: Within normal limits  CV: As per interval history  Resp: As per interval history  GI: Within normal limits   : Within normal limits  Musculoskeletal: Within normal limits  Skin: Within normal limits  Neurological: Within normal limits  Psychiatric: Within normal limits  Endocrine: Within normal limits  Hematologic/Lymphatic: Within normal limits  Allergic/Immunologic: Within normal limits    MEDICATIONS:     Pulmonary "    Anti-microbials:    Cardiovascular:  amLODIPine   Tablet 5 milliGRAM(s) Oral daily  furosemide    Tablet 40 milliGRAM(s) Oral two times a day  isosorbide   mononitrate ER Tablet (IMDUR) 30 milliGRAM(s) Oral daily  metoprolol succinate ER 50 milliGRAM(s) Oral daily    Other:  apixaban 2.5 milliGRAM(s) Oral every 12 hours  aspirin enteric coated 81 milliGRAM(s) Oral daily  atorvastatin 20 milliGRAM(s) Oral at bedtime  finasteride 5 milliGRAM(s) Oral daily  levothyroxine 25 MICROGram(s) Oral daily  simvastatin 10 milliGRAM(s) Oral at bedtime    MEDICATIONS  (PRN):  acetaminophen   Tablet .. 650 milliGRAM(s) Oral every 8 hours PRN Mild Pain (1 - 3)        OBJECTIVE:    I&O's Detail    2020 07:  -  2020 07:00  --------------------------------------------------------  IN:    IV PiggyBack: 300 mL    Oral Fluid: 1760 mL  Total IN: 2060 mL    OUT:    Voided (mL): 2525 mL  Total OUT: 2525 mL    Total NET: -465 mL      2020 07:  -  2020 12:40  --------------------------------------------------------  IN:    Oral Fluid: 360 mL  Total IN: 360 mL    OUT:  Total OUT: 0 mL    Total NET: 360 mL    PHYSICAL EXAM:       ICU Vital Signs Last 24 Hrs  T(C): 36.6 (2020 04:14), Max: 36.6 (2020 19:50)  T(F): 97.9 (2020 04:14), Max: 97.9 (2020 04:14)  HR: 60 (2020 04:14) (60 - 62)  BP: 156/67 (2020 04:14) (120/62 - 156/67)  BP(mean): --  ABP: --  ABP(mean): --  RR: 18 (2020 04:14) (18 - 19)  SpO2: 94% (2020 04:14) (93% - 94%) on 2lpm nasal canula    General: Awake. Alert. Cooperative. No distress. Appears stated age.	  HEENT: Atraumatic. Normocephalic. Anicteric. Normal oral mucosa. PERRL. EOMI.  Neck: Supple. Trachea midline. Thyroid without enlargement/tenderness/nodules. No carotid bruit. No JVD.	  Cardiovascular: Regular rate and rhythm. S1 S2 normal. No murmurs, rubs or gallops.  Respiratory: Respirations unlabored. Decreased breath sounds at the bases with dullness to percussion. No curvature.  Abdomen: Soft. Non-tender. Non-distended. No organomegaly. No masses. Normal bowel sounds.  Extremities: Warm to touch. No clubbing or cyanosis. Resolved pretibial edema right > left. Lower extremity venous stasis changes  Pulses: 2+ peripheral pulses all extremities.	  Skin: Normal skin color. No rashes or lesions. No ecchymoses. No cyanosis. Warm to touch.  Lymph Nodes: Cervical, supraclavicular and axillary nodes normal  Neurological: Motor and sensory examination equal and normal. A and O x 3  Psychiatry: Appropriate mood and affect.    LABS:                          11.5   8.82  )-----------( 368      ( 2020 08:12 )             35.7     CBC    WBC  8.82 <==, 9.76 <==, 8.25 <==    Hemoglobin  11.5 <<==, 11.1 <<==, 10.8 <<==    Hematocrit  35.7 <==, 35.3 <==, 33.9 <==    Platelets  368 <==, 363 <==, 354 <==      133<L>  |  92<L>  |  25<H>  ----------------------------<  85    11-03  3.3<L>   |  27  |  1.55<H>      LYTES    sodium  133 <==, 135 <==, 135 <==    potassium   3.3 <==, 4.0 <==, 4.4 <==    chloride  92 <==, 97 <==, 99 <==    carbon dioxide  27 <==, 24 <==, 25 <==    =============================================================================================  RENAL FUNCTION:    Creatinine:   1.55  <<==, 1.49  <<==, 1.74  <<==    BUN:   25 <==, 27 <==, 31 <==    ============================================================================================    calcium   9.1 <==, 9.2 <==, 8.9 <==    phos   3.3 <==    mag   2.2 <==    ============================================================================================  LFTs    AST:   34 <== , 31 <==     ALT:  21  <== , 18  <==     AP:  93  <=, 89  <=    Bili:  1.0  <=, 0.8  <=    PT/INR - ( 31 Oct 2020 19:56 )   PT: 15.0 sec;   INR: 1.26 ratio      Procalcitonin, Serum: 0.15 ng/mL ( @ 07:06)    Serum Pro-Brain Natriuretic Peptide: 2696 pg/mL (10-31 @ 19:56)    CARDIAC MARKERS ( 2020 07:06 )  CPK x     /CKMB x     /CKMB Units x        troponin 71 ng/L    CARDIAC MARKERS ( 2020 00:27 )  CPK x     /CKMB x     /CKMB Units x        troponin 70 ng/L    CARDIAC MARKERS ( 31 Oct 2020 19:56 )  CPK x     /CKMB x     /CKMB Units x        troponin 72 ng/L    < from: Transthoracic Echocardiogram (20 @ 08:20) >    Patient name: CLEMENTE BULLOCK  YOB: 1925   Age: 89 (M)   MR#: [id number]  Study Date: 11/3/2020  Location: [address] [place], [place] [id number]: Sheridan Hernandez MAX  Study quality: Technically fair  Referring Physician: Kofi Sanchez MD  Blood Pressure: 156/67 mmHg  Height: 168 cm  Weight: 64 kg  BSA: 1.7 m2  Heart Rhythm: Atrial flutter  ------------------------------------------------------------------------  PROCEDURE: Transthoracic echocardiogram with 2-D, M-Mode  and complete spectral and color flow Doppler.  INDICATION: Dyspnea, unspecified (R06.00)  ------------------------------------------------------------------------  Dimensions:    Normal Values:  LA:     4.5    2.0 - 4.0 cm  Ao:     2.7    2.0 - 3.8 cm  SEPTUM: 1.3    0.6 - 1.2 cm  PWT:    1.3    0.6 - 1.1 cm  LVIDd:  4.3    3.0 - 5.6 cm  LVIDs:  2.5    1.8 - 4.0 cm  Derived variables:  LVMI: 121 g/m2  RWT: 0.60  Fractional short: 42 %  EF (Visual Estimate): 75 %  Doppler Peak Velocity (m/sec): AoV=1.7 TV=3.2  ------------------------------------------------------------------------  Observations:  Mitral Valve: Normal mitral valve. Mitral annular  calcification. Mild mitral regurgitation.  Aortic Valve/Aorta: Calcified aortic valve with normal  opening.  Normal aortic root size.  Left Atrium: Moderate left atrial enlargement.  Left Ventricle: Normal left ventricular internal  dimensions. Mild-moderate concentric hypertrophy.  Hyperdynamic left ventricle.  Right Heart: Mild right atrial enlargement. Normal right  ventricular size and function.  A device wire is noted in the right heart.  Normal tricuspid valve. Mild tricuspid regurgitation.  Normal pulmonic valve. Mild pulmonic regurgitation.  Pericardium/Pleura: Normal pericardium with no pericardial  effusion.  Hemodynamic: Estimated right atrial pressure is normal.  Mild-moderate pulmonary hypertension. Estimated PASP 45  mmHg.  ------------------------------------------------------------------------  Conclusions:  Hyperdynamic left ventricle.  Mild-moderate pulmonary hypertension.  A device wire is noted in the right heart.  ------------------------------------------------------------------------  Confirmed on  11/3/2020 - 10:17:57 by Miguel Dolan MD, FASE  ------------------------------------------------------------------------    < end of copied text >  ---------------------------------------------------------------------------------------------------------------    MICROBIOLOGY:     Legionella pneumophila Antigen, Urine (20 @ 13:40)   Legionella Antigen, Urine: Negative: Negative Testing method: Immunochromatographic Assay. L. pneumpohila   serogroup 1 antigen in urine NOT detected, suggesting NO recent or   current infection. Infection due to Legionella cannot be ruled out: other   serogroups and species may cause disease, antigen may not be present in   urine in early infection, or the level of antigens in urine may be below   the detection limit of the test. Order “Culture –Legionella” is   recommended for uncommon cases of suspected Legionella pneumonia due to   organisms other than L. pneumophila serogroup 1.   Urinalysis Basic - ( 31 Oct 2020 20:28 )    Color: Light Yellow / Appearance: Clear / S.012 / pH: x  Gluc: x / Ketone: Negative  / Bili: Negative / Urobili: Negative   Blood: x / Protein: Negative / Nitrite: Negative   Leuk Esterase: Negative / RBC: x / WBC x   Sq Epi: x / Non Sq Epi: x / Bacteria: x    Culture - Urine (20 @ 02:06)   Specimen Source: .Urine Clean Catch (Midstream)   Culture Results:   <10,000 CFU/mL Normal Urogenital Mimi   ---------------------------------------------------------------------------------------------------------------  Culture - Blood (10.31.20 @ 22:12)   Specimen Source: .Blood Blood-Peripheral   Culture Results:   No growth to date.   ---------------------------------------------------------------------------------------------------------------  Culture - Blood (10.31.20 @ 22:12)   Specimen Source: .Blood Blood-Peripheral   Culture Results:   No growth to date.   ---------------------------------------------------------------------------------------------------------------    RADIOLOGY:  [x] Chest radiographs reviewed and interpreted by me    < from: Xray Chest 1 View- PORTABLE-Urgent (10.31.20 @ 19:56) >    EXAM:  XR CHEST PORTABLE URGENT 1V                          PROCEDURE DATE:  10/31/2020      INTERPRETATION:  CLINICAL INFORMATION: Sepsis.    TECHNIQUE: Frontal radiograph of the chest.    COMPARISON: Chest x-ray dated 2013.    FINDINGS:    LUNGS and pleura: Elevation of the right hemidiaphragm that is new when compared with prior. Small right-sided pleural effusion with adjacent atelectasis. No pneumothorax.    HEART AND MEDIASTINUM: Cardiomediastinal silhouette within normal limits. There is a left-sided dual-lead pacemaker.    SKELETON: Unremarkable skeletal structures.      IMPRESSION:    Elevation of the right hemidiaphragm.  Small right-sided pleural effusion with adjacent atelectasis.      KASANDRA NEWBY MD; Resident Radiology  This document has been electronically signed.  ALTAF ESPINOZA MD; Attending Radiologist  This document has been electronically signed. 2020  8:25AM    < end of copied text >  ---------------------------------------------------------------------------------------------------------------    < from: CT Chest No Cont (20 @ 13:24) >    EXAM:  CT CHEST                          PROCEDURE DATE:  2020      INTERPRETATION:  CLINICAL INDICATION: Shortness of breath, CHF, coronary artery disease.    Axial CT images of the chest are obtained without intravenous administration of contrast.    No prior chest CTs are available for comparison.    Left cardiac device with the leads terminating within the right atrium and right ventricle.    No enlarged axillary, mediastinal or hilar lymph nodes. Multiple subcentimeter nonspecific mediastinal lymph nodes. Cardiomegaly. No pericardial effusion. Diffuse atherosclerotic disease with involvement of the aorta and the coronary arteries. Aortic valve calcifications.    Evaluation of the upper abdomen demonstrate nodularity of the hepatic surface may represent cirrhosis.    Small bilateral pleural effusions, right greater than left containing areas of loculation on the right.    Evaluation of the lung parenchyma demonstrate bilateral interlobular septal thickening, right greater than left suggestive of pulmonary edema. Right lower lobe partial compressive atelectasis. 2 mm bilateral calcified granulomas.    Left lower lobe peripheral curvilinear opacity, part of which measures about 2.1 x 1.2 cm on image 392 of series 4 with some associated calcification. Some surrounding left lower lobe parenchymal lucency is noted which can be seen in the setting of pulmonary air entrapment.    No central endobronchial lesions. Secretions within the trachea.    Degenerative changes of the spine and the shoulders.    IMPRESSION: Small bilateral pleural effusions, right greater than left with interlobular septal thickening suggestive of pulmonary edema. Cardiomegaly with coronary artery atherosclerotic disease.    Right lower lobe partial compressive atelectasis. Please note evaluation of the atelectatic portion of the right lower lobe is limited due to lack of intravenous contrast.    Left lower lobe peripheral 2.1 x 1.2 cm curvilinear nodule is of unclear etiology may be due to impacted dilated airway given surrounding lung parenchymal lucency.    3 month follow-up chest CT is recommended for further evaluation of the above findings.    TRISTAN REA MD; Attending Radiologist  This document has been electronically signed. 2020  1:46PM    < end of copied text >  ---------------------------------------------------------------------------------------------------------------  < from: [facility] Duplex Lower Ext Vein Scan, Chika (20 @ 11:59) >    EXAM:  DUPLEX SCAN EXT VEINS LOWER BI                          PROCEDURE DATE:  2020      INTERPRETATION:  CLINICAL INFORMATION: Bilateral lower extremity swelling, rule out DVT.    COMPARISON: None available.    TECHNIQUE: Duplex sonography of the BILATERAL LOWER extremity veins with color and spectral Doppler, with and without compression.    FINDINGS:    There is normal compressibility of the bilateral common femoral, femoral and popliteal veins.  Doppler examination shows normal spontaneous and phasic flow.    No calf vein thrombosis is detected.    IMPRESSION:  No evidence of deep venous thrombosis in either lower extremity.    GUDELIA MEEKS M.D., ATTENDING RADIOLOGIST  This document has been electronically signed. 2020 12:13PM    < end of copied text >  ---------------------------------------------------------------------------------------------------------------

## 2020-11-03 NOTE — PROGRESS NOTE ADULT - ASSESSMENT
89 year male, h/o HTN hyperlipidemia hypothyroidism, h/o CAD , sick sinus syndrome, post [facility] PPM, now admitted with progressive pedal edema, orthopnea,  midlly elevated BNP and possible pneumonia, improving with IV lasix and antibiotics. antibiotic d/c , severe hypoxia

## 2020-11-03 NOTE — PROGRESS NOTE ADULT - SUBJECTIVE AND OBJECTIVE BOX
Patient is a 89y old  Male who presents with a chief complaint of hypoxia (02 Nov 2020 20:36)      SUBJECTIVE / OVERNIGHT EVENTS: No new complaints.   Review of Systems  chest pain no  palpitations no  sob no  nausea no  headache no    MEDICATIONS  (STANDING):  amLODIPine   Tablet 5 milliGRAM(s) Oral daily  apixaban 2.5 milliGRAM(s) Oral every 12 hours  aspirin enteric coated 81 milliGRAM(s) Oral daily  atorvastatin 20 milliGRAM(s) Oral at bedtime  finasteride 5 milliGRAM(s) Oral daily  furosemide    Tablet 40 milliGRAM(s) Oral two times a day  isosorbide   mononitrate ER Tablet (IMDUR) 30 milliGRAM(s) Oral daily  levothyroxine 25 MICROGram(s) Oral daily  metoprolol succinate ER 50 milliGRAM(s) Oral daily  simvastatin 10 milliGRAM(s) Oral at bedtime    MEDICATIONS  (PRN):  acetaminophen   Tablet .. 650 milliGRAM(s) Oral every 8 hours PRN Mild Pain (1 - 3)      Vital Signs Last 24 Hrs  T(C): 36.5 (03 Nov 2020 14:03), Max: 36.6 (02 Nov 2020 19:50)  T(F): 97.7 (03 Nov 2020 14:03), Max: 97.9 (03 Nov 2020 04:14)  HR: 60 (03 Nov 2020 14:03) (60 - 62)  BP: 101/59 (03 Nov 2020 14:03) (101/59 - 156/67)  BP(mean): --  RR: 18 (03 Nov 2020 14:03) (17 - 19)  SpO2: 96% (03 Nov 2020 14:03) (93% - 97%)    PHYSICAL EXAM:  GENERAL: NAD  HEAD:  Atraumatic, Normocephalic  EYES: EOMI, PERRLA, conjunctiva and sclera clear  NECK: Supple, No JVD  CHEST/LUNG: Clear to auscultation bilaterally; No wheeze  HEART: Regular rate and rhythm; No murmurs, rubs, or gallops  ABDOMEN: Soft, Nontender, Nondistended; Bowel sounds present  EXTREMITIES:  1+ bipedal edema  PSYCH: AAOx3  NEUROLOGY: non-focal  SKIN: No rashes or lesions    LABS:                        11.5   8.82  )-----------( 368      ( 03 Nov 2020 08:12 )             35.7     11-03    133<L>  |  92<L>  |  25<H>  ----------------------------<  85  3.3<L>   |  27  |  1.55<H>    Ca    9.1      03 Nov 2020 08:12                Culture - Urine (collected 01 Nov 2020 02:06)  Source: .Urine Clean Catch (Midstream)  Final Report (01 Nov 2020 21:00):    <10,000 CFU/mL Normal Urogenital Mimi    Culture - Blood (collected 31 Oct 2020 22:12)  Source: .Blood Blood-Peripheral  Preliminary Report (01 Nov 2020 22:02):    No growth to date.    Culture - Blood (collected 31 Oct 2020 22:12)  Source: .Blood Blood-Peripheral  Preliminary Report (01 Nov 2020 22:02):    No growth to date.        RADIOLOGY & ADDITIONAL TESTS:    Imaging Personally Reviewed:    Consultant(s) Notes Reviewed:      Care Discussed with Consultants/Other Providers:

## 2020-11-03 NOTE — PHYSICAL THERAPY INITIAL EVALUATION ADULT - GAIT DEVIATIONS NOTED, PT EVAL
decreased piyush/increased time in double stance/pt understood to use RW to amb with close supervision/decreased step length/decreased stride length/decreased weight-shifting ability

## 2020-11-03 NOTE — PROGRESS NOTE ADULT - SUBJECTIVE AND OBJECTIVE BOX
INTERVAL HPI/OVERNIGHT EVENTS: patient feels well, but still markedlyu hypoxic on room air.    MEDICATIONS  (STANDING):  apixaban 2.5 milliGRAM(s) Oral every 12 hours  aspirin enteric coated 81 milliGRAM(s) Oral daily  atorvastatin 20 milliGRAM(s) Oral at bedtime  finasteride 5 milliGRAM(s) Oral daily  furosemide    Tablet 40 milliGRAM(s) Oral two times a day  isosorbide   mononitrate ER Tablet (IMDUR) 30 milliGRAM(s) Oral daily  levothyroxine 50 MICROGram(s) Oral daily  metoprolol succinate ER 50 milliGRAM(s) Oral daily  potassium chloride    Tablet ER 20 milliEquivalent(s) Oral every 2 hours  simvastatin 10 milliGRAM(s) Oral at bedtime    MEDICATIONS  (PRN):  acetaminophen   Tablet .. 650 milliGRAM(s) Oral every 8 hours PRN Mild Pain (1 - 3)      Allergies    No Known Allergies    Intolerances      ROS:  General: Pt denies recent weight loss/fever/chills    Neurological: denies numbness or  sensation loss    Cardiovascular: denies chest pain/palpitations/leg edema    Respiratory and Thorax: denies SOB/cough/wheezing    Gastrointestinal: denies abdominal pain/diarrhea/constipation/bloody stool    Genitourinary: denies urinary frequency/urgency/ dysuria    Musculoskeletal: denies joint pain or swelling, denies restricted motion    Hematologic: denies abnormal bleeding  	    	  	    		        	    	            Vital Signs Last 24 Hrs  T(C): 36.8 (03 Nov 2020 20:43), Max: 36.8 (03 Nov 2020 20:43)  T(F): 98.2 (03 Nov 2020 20:43), Max: 98.2 (03 Nov 2020 20:43)  HR: 60 (03 Nov 2020 20:43) (60 - 73)  BP: 115/57 (03 Nov 2020 20:43) (101/59 - 156/67)  BP(mean): --  RR: 18 (03 Nov 2020 20:43) (17 - 18)  SpO2: 98% (03 Nov 2020 20:43) (94% - 98%)  Daily     Daily     11-02 @ 07:01  -  11-03 @ 07:00  --------------------------------------------------------  IN: 2060 mL / OUT: 2525 mL / NET: -465 mL    11-03 @ 07:01  -  11-03 @ 21:32  --------------------------------------------------------  IN: 840 mL / OUT: 100 mL / NET: 740 mL      Physical Exam:    osmar augustin  no JVD  cor RRR  lung decreased BS at bases  ext mild edema      LABS:                        11.5   8.82  )-----------( 368      ( 03 Nov 2020 08:12 )             35.7     11-03    133<L>  |  92<L>  |  25<H>  ----------------------------<  85  3.3<L>   |  27  |  1.55<H>    Ca    9.1      03 Nov 2020 08:12            RADIOLOGY & ADDITIONAL TESTS:    TELE:    EKG:

## 2020-11-03 NOTE — PHYSICAL THERAPY INITIAL EVALUATION ADULT - ADDITIONAL COMMENTS
pt lives in Assisted Living Facility called [facility] in [place] ; has 5 children pt lives in Assisted Living Facility called [facility] in [place] ; has 5 children; pt has HHA assisted in am's to get washed and dressed per pt ; pt amb with std cane independent and owns a rolling walker as well at [facility] ; pt has shower chair; decline ID caregiver

## 2020-11-03 NOTE — PROGRESS NOTE ADULT - ASSESSMENT
89 yo male with PMH of HTN, HLD, Bradycardia s/p PPM, on aspirin and eliquis, hypothyroidism, BPH, presents here from assisted living for hypoxia.     Acute respiratory failure with hypoxia.  - CHF exacerbation  - discontinue ceftriaxone, azithromycin  - echocardiogram  - c/w IV diuretics.   - Pulmonary evaluation noted    CHF exacerbation.   - Patient with right pleural effusion, JVD, b/l LE edema, elevated BNP  - Patient has elevated troponin, possibly 2/2 to CHF vs. RJ  - continue IV Lasix 40mg q12 for now  - echocardiogram- Cardiology evaluation noted    Hypothyroidism (acquired).  - c/w synthroid. Increase Synthroid dose to 50 mcg.     Hypertension.  - c/w isosorbide mononitrate, metoprolol, Norvasc Lasix     Hyperlipidemia.   - c/w atorvastatin.    BPH (benign prostatic hyperplasia).   - c/w finasteride.     Leg edema.  - b/l LE edema, likely 2/2 to heart failure  - [facility] duplex no DVT    H/O cardiac arrhythmia.    - ? hx of arrhythmia s/p PPM  - pacemaker interrogation  - c/w Eliquis and aspirin.     Prophylactic measure.  - on Eliquis    PT    Kofi Sanchez MD pager [phone]

## 2020-11-03 NOTE — PHYSICAL THERAPY INITIAL EVALUATION ADULT - BALANCE DISTURBANCE, IDENTIFIED IMPAIRMENT CONTRIBUTE, REHAB EVAL
decrease endurance/impaired sensory feedback/decreased strength/abnormal muscle tone/impaired postural control/decreased sensation

## 2020-11-03 NOTE — PHYSICAL THERAPY INITIAL EVALUATION ADULT - IMPAIRED TRANSFERS: SIT/STAND, REHAB EVAL
impaired balance/impaired postural control/sit-stand x 1 at std cane cg of1 at RW close supervision/decreased sensation/impaired sensory feedback/decreased strength

## 2020-11-03 NOTE — PHYSICAL THERAPY INITIAL EVALUATION ADULT - GAIT TRAINING, PT EVAL
GOAL: pt will amb with RW for 150 ft x 2 in 1-2 weeks steady gait independent , amb with std cane independent in 2-3 weeks for 150 ft

## 2020-11-03 NOTE — PHYSICAL THERAPY INITIAL EVALUATION ADULT - GENERAL OBSERVATIONS, REHAB EVAL
pt received in bedside chair nad table in front , call bell,phone and + chair alarm active ;pt agreeable for PT

## 2020-11-03 NOTE — PHYSICAL THERAPY INITIAL EVALUATION ADULT - PRECAUTIONS/LIMITATIONS, REHAB EVAL
CT CHEST :Small bilateral pleural effusions, right greater than left with interlobular septal thickening suggestive of pulmonary edema. Cardiomegaly with coronary artery atherosclerotic disease.Right lower lobe partial compressive atelectasis. Please note evaluation of the atelectatic portion of the right lower lobe is limited due to lack of intravenous contrast.Left lower lobe peripheral 2.1 x 1.2 cm curvilinear nodule is of unclear etiology may be due to impacted dilated airway given surrounding lung parenchymal lucency.COVID Antibody (-) and COVID 19 (not detected 10/31 and 11/1/20 ; Doppler LE's (-) 11/2/20; TTE: mod/mild pulmonary HTN ; Legionella (-) 11/2/20 , Na 133/fall precautions 89 yo male with PMH of HTN, HLD, Bradycardia s/p PPM, on aspirin and eliquis, hypothyroidism, BPH, presents here from assisted living for hypoxia.  History obtained from both patient and staff at Assisted living.  Patient states for last few days he noticed b/l LE swelling right > left.  He also has been having mild SOB, today started having some cough, unable to bring up phlegm.  He denies any fever or chills.  Also denies chest pain, orthopnea, PND.  Denies nausea, vomiting, abdominal pain, diarrhea.  Patient was ordered amoxicillin, but because of hypoxia of 85%, he was sent here for further evaluation.  He also had CXR was consistent with pneumonia and therefore patient was sent to [facility].  On arrival, patient's vitals showed temp 98.9, HR 70, /70, saturation 96% on 4L, now on 2L.  CT CHEST :Small bilateral pleural effusions, right greater than left with interlobular septal thickening suggestive of pulmonary edema. Cardiomegaly with coronary artery atherosclerotic disease.Right lower lobe partial compressive atelectasis. Please note evaluation of the atelectatic portion of the right lower lobe is limited due to lack of intravenous contrast.Left lower lobe peripheral 2.1 x 1.2 cm curvilinear nodule is of unclear etiology may be due to impacted dilated airway given surrounding lung parenchymal lucency.COVID Antibody (-) and COVID 19 (not detected 10/31 and 11/1/20 ; Doppler LE's (-) 11/2/20; TTE: mod/mild pulmonary HTN ; Legionella (-) 11/2/20 , Na 133/fall precautions

## 2020-11-03 NOTE — PHYSICAL THERAPY INITIAL EVALUATION ADULT - IMPAIRMENTS CONTRIBUTING TO GAIT DEVIATIONS, PT EVAL
impaired sensory feedback/impaired postural control/decreased strength/decreased endurance ; pt amb w/ high steppage gait due to L foot drop and has been doing for quite some time per pt , pt never wore a AFO and compensates for drop foot/decreased sensation/impaired balance

## 2020-11-04 LAB
ANION GAP SERPL CALC-SCNC: 11 MMOL/L — SIGNIFICANT CHANGE UP (ref 5–17)
BUN SERPL-MCNC: 30 MG/DL — HIGH (ref 7–23)
CALCIUM SERPL-MCNC: 9.2 MG/DL — SIGNIFICANT CHANGE UP (ref 8.4–10.5)
CHLORIDE SERPL-SCNC: 95 MMOL/L — LOW (ref 96–108)
CO2 SERPL-SCNC: 28 MMOL/L — SIGNIFICANT CHANGE UP (ref 22–31)
CREAT SERPL-MCNC: 2.03 MG/DL — HIGH (ref 0.5–1.3)
GLUCOSE SERPL-MCNC: 96 MG/DL — SIGNIFICANT CHANGE UP (ref 70–99)
HCT VFR BLD CALC: 34.6 % — LOW (ref 39–50)
HGB BLD-MCNC: 11 G/DL — LOW (ref 13–17)
MCHC RBC-ENTMCNC: 30.6 PG — SIGNIFICANT CHANGE UP (ref 27–34)
MCHC RBC-ENTMCNC: 31.8 GM/DL — LOW (ref 32–36)
MCV RBC AUTO: 96.4 FL — SIGNIFICANT CHANGE UP (ref 80–100)
NRBC # BLD: 0 /100 WBCS — SIGNIFICANT CHANGE UP (ref 0–0)
NT-PROBNP SERPL-SCNC: 3619 PG/ML — HIGH (ref 0–300)
PLATELET # BLD AUTO: 362 K/UL — SIGNIFICANT CHANGE UP (ref 150–400)
POTASSIUM SERPL-MCNC: 4.8 MMOL/L — SIGNIFICANT CHANGE UP (ref 3.5–5.3)
POTASSIUM SERPL-SCNC: 4.8 MMOL/L — SIGNIFICANT CHANGE UP (ref 3.5–5.3)
RBC # BLD: 3.59 M/UL — LOW (ref 4.2–5.8)
RBC # FLD: 16.7 % — HIGH (ref 10.3–14.5)
SODIUM SERPL-SCNC: 134 MMOL/L — LOW (ref 135–145)
WBC # BLD: 8.88 K/UL — SIGNIFICANT CHANGE UP (ref 3.8–10.5)
WBC # FLD AUTO: 8.88 K/UL — SIGNIFICANT CHANGE UP (ref 3.8–10.5)

## 2020-11-04 PROCEDURE — 71045 X-RAY EXAM CHEST 1 VIEW: CPT | Mod: 26

## 2020-11-04 RX ORDER — METOPROLOL TARTRATE 50 MG
100 TABLET ORAL DAILY
Refills: 0 | Status: DISCONTINUED | OUTPATIENT
Start: 2020-11-04 | End: 2020-11-09

## 2020-11-04 RX ORDER — HEPARIN SODIUM 5000 [USP'U]/ML
5000 INJECTION INTRAVENOUS; SUBCUTANEOUS EVERY 12 HOURS
Refills: 0 | Status: DISCONTINUED | OUTPATIENT
Start: 2020-11-04 | End: 2020-11-06

## 2020-11-04 RX ORDER — ISOSORBIDE MONONITRATE 60 MG/1
60 TABLET, EXTENDED RELEASE ORAL DAILY
Refills: 0 | Status: DISCONTINUED | OUTPATIENT
Start: 2020-11-04 | End: 2020-11-09

## 2020-11-04 RX ADMIN — FINASTERIDE 5 MILLIGRAM(S): 5 TABLET, FILM COATED ORAL at 12:59

## 2020-11-04 RX ADMIN — Medication 50 MILLIGRAM(S): at 05:09

## 2020-11-04 RX ADMIN — Medication 40 MILLIGRAM(S): at 05:09

## 2020-11-04 RX ADMIN — APIXABAN 2.5 MILLIGRAM(S): 2.5 TABLET, FILM COATED ORAL at 05:08

## 2020-11-04 RX ADMIN — ATORVASTATIN CALCIUM 20 MILLIGRAM(S): 80 TABLET, FILM COATED ORAL at 21:57

## 2020-11-04 RX ADMIN — Medication 20 MILLIEQUIVALENT(S): at 00:35

## 2020-11-04 RX ADMIN — ISOSORBIDE MONONITRATE 30 MILLIGRAM(S): 60 TABLET, EXTENDED RELEASE ORAL at 12:58

## 2020-11-04 RX ADMIN — Medication 81 MILLIGRAM(S): at 12:59

## 2020-11-04 RX ADMIN — SIMVASTATIN 10 MILLIGRAM(S): 20 TABLET, FILM COATED ORAL at 21:58

## 2020-11-04 RX ADMIN — Medication 50 MICROGRAM(S): at 05:10

## 2020-11-04 RX ADMIN — AMLODIPINE BESYLATE 2.5 MILLIGRAM(S): 2.5 TABLET ORAL at 05:12

## 2020-11-04 NOTE — PROGRESS NOTE ADULT - ASSESSMENT
89 year male, h/o HTN hyperlipidemia hypothyroidism, h/o CAD , sick sinus syndrome, post [facility] PPM, now admitted with progressive pedal edema, orthopnea,  midlly elevated BNP and possible pneumonia, . antibiotic d/c ,  still hypoxic with persistent right sided effusion , diastolic heart failure    he dveloped sustained AF/at in June ? contributing factor     ischemic eval pending    creatinine increased to 2 -furosemide on hold

## 2020-11-04 NOTE — PROGRESS NOTE ADULT - SUBJECTIVE AND OBJECTIVE BOX
INTERVAL HPI/OVERNIGHT EVENTS: patient remains with significant hypoxia, )2 sat 88-on room air    furosemide held due to increased creatinine    stress test  pending     echo with hyperdynamic LV function    MEDICATIONS  (STANDING):  amLODIPine   Tablet 2.5 milliGRAM(s) Oral daily  aspirin enteric coated 81 milliGRAM(s) Oral daily  atorvastatin 20 milliGRAM(s) Oral at bedtime  finasteride 5 milliGRAM(s) Oral daily  heparin   Injectable 5000 Unit(s) SubCutaneous every 12 hours  isosorbide   mononitrate ER Tablet (IMDUR) 30 milliGRAM(s) Oral daily  levothyroxine 50 MICROGram(s) Oral daily  metoprolol succinate ER 50 milliGRAM(s) Oral daily  simvastatin 10 milliGRAM(s) Oral at bedtime    MEDICATIONS  (PRN):  acetaminophen   Tablet .. 650 milliGRAM(s) Oral every 8 hours PRN Mild Pain (1 - 3)      Allergies    No Known Allergies    Intolerances      ROS:  General: Pt denies recent weight loss/fever/chills    Neurological: denies numbness or  sensation loss    Cardiovascular: denies chest pain/palpitations/leg edema    Respiratory and Thorax: denies SOB/cough/wheezing    Gastrointestinal: denies abdominal pain/diarrhea/constipation/bloody stool    Genitourinary: denies urinary frequency/urgency/ dysuria    Musculoskeletal: denies joint pain or swelling, denies restricted motion    Hematologic: denies abnormal bleeding  	    	  	    		        	    	            Vital Signs Last 24 Hrs  T(C): 36.8 (2020 11:06), Max: 36.8 (2020 20:43)  T(F): 98.2 (2020 11:06), Max: 98.2 (2020 20:43)  HR: 60 (2020 11:06) (60 - 68)  BP: 123/62 (2020 11:06) (111/67 - 133/57)  BP(mean): --  RR: 18 (2020 11:06) (18 - 18)  SpO2: 96% (2020 11:06) (93% - 98%)  Daily     Daily Weight in k.6 (2020 11:06)     @ 07:01  -   @ 07:00  --------------------------------------------------------  IN: 1080 mL / OUT: 1080 mL / NET: 0 mL     @ 07:01  -   @ 20:26  --------------------------------------------------------  IN: 560 mL / OUT: 300 mL / NET: 260 mL      Physical Exam:    renéewtravis welch  no JVD  decreased BS  right base  abd soft  ext mild edema      LABS:                        11.0   8.88  )-----------( 362      ( 2020 07:18 )             34.6         134<L>  |  95<L>  |  30<H>  ----------------------------<  96  4.8   |  28  |  2.03<H>    Ca    9.2      2020 07:18            RADIOLOGY & ADDITIONAL TESTS:    TELE:    EKG:

## 2020-11-04 NOTE — PROGRESS NOTE ADULT - ASSESSMENT
ASSESSMENT:    dyspnea/hypoxemia due to exacerbation of chronic CHF - ECHO with hyperdynamic LV, mild-moderate pulmonary hypertension and no significant valvular heart disease - JR with diuresis    abnormal chest CT  1) small bilateral pleural effusions right > left with areas of loculation on the right and associated atelectasis  2) bilateral interlobular septal thickening suggestive of pulmonary edema  3) left lower lobe peripheral opacity with some associated calcification suggesting chronicity - lucent area of surrounding lung parenchyma suggestive of air trapping -> suspect mucous plugging rather than neoplasm  4) while it is impossible to definitively "rule out" pneumonia within the atelectatic lung, I believe that the patient is without infection in the absence of leukocytosis, fever, an elevated procalcitonin level or positive cultures    PLAN/RECOMMENDATIONS:    stable oxygenation on room air  observe off antibiotics  holding Eliquis in anticipation of a thoracentesis  observe off pulmonary medications  follow-up CT scan in 3 months to reevaluate the pulmonary "nodule"  cardiology follow-up  hold diuretics  cardiac meds: ASA/eliquis/lipitor/zocor/norvasc/imdur/toprol XL  await stress test      Will follow with you. Plan of care discussed with the patient at bedside and Dr. Padgett and the dedicated floor NP    Aneesh Salter MD, [facility]  [phone]  Pulmonary Medicine

## 2020-11-04 NOTE — PROGRESS NOTE ADULT - SUBJECTIVE AND OBJECTIVE BOX
[facility] - PROGRESS NOTE    CHIEF COMPLAINT: hypoxemia; pleural effusions; atelectasis; lung nodule    INTERVAL HISTORY:  no shortness of breath or hypoxemia sitting in the chair on room air; resolved lower extremity swelling right > left; no cough, sputum production, chest congestion or wheeze; no fevers, chills or sweats; no chest pain/pressure or palpitations    REVIEW OF SYSTEMS:  Constitutional: As per interval history  HEENT: Within normal limits  CV: As per interval history  Resp: As per interval history  GI: Within normal limits   : Within normal limits  Musculoskeletal: Within normal limits  Skin: Within normal limits  Neurological: Within normal limits  Psychiatric: Within normal limits  Endocrine: Within normal limits  Hematologic/Lymphatic: Within normal limits  Allergic/Immunologic: Within normal limits    MEDICATIONS:     Pulmonary "    Anti-microbials:    Cardiovascular:  amLODIPine   Tablet 2.5 milliGRAM(s) Oral daily  furosemide    Tablet 40 milliGRAM(s) Oral two times a day  isosorbide   mononitrate ER Tablet (IMDUR) 30 milliGRAM(s) Oral daily  metoprolol succinate ER 50 milliGRAM(s) Oral daily    Other:  apixaban 2.5 milliGRAM(s) Oral every 12 hours  aspirin enteric coated 81 milliGRAM(s) Oral daily  atorvastatin 20 milliGRAM(s) Oral at bedtime  finasteride 5 milliGRAM(s) Oral daily  levothyroxine 50 MICROGram(s) Oral daily  simvastatin 10 milliGRAM(s) Oral at bedtime    MEDICATIONS  (PRN):  acetaminophen   Tablet .. 650 milliGRAM(s) Oral every 8 hours PRN Mild Pain (1 - 3)      OBJECTIVE:    I&O's Detail    2020 07:01  -  2020 07:00  --------------------------------------------------------  IN:    Oral Fluid: 1080 mL  Total IN: 1080 mL    OUT:    Voided (mL): 1080 mL  Total OUT: 1080 mL    Total NET: 0 mL    PHYSICAL EXAM:       ICU Vital Signs Last 24 Hrs  T(C): 36.2 (2020 04:49), Max: 36.8 (2020 20:43)  T(F): 97.2 (2020 04:49), Max: 98.2 (2020 20:43)  HR: 62 (2020 04:49) (60 - 73)  BP: 111/67 (2020 04:49) (101/59 - 117/58)  BP(mean): --  ABP: --  ABP(mean): --  RR: 18 (2020 04:49) (17 - 18)  SpO2: 93% (2020 04:49) (93% - 98%) on room air at rest and with ambulation     General: Awake. Alert. Cooperative. No distress. Appears stated age.	  HEENT: Atraumatic. Normocephalic. Anicteric. Normal oral mucosa. PERRL. EOMI.  Neck: Supple. Trachea midline. Thyroid without enlargement/tenderness/nodules. No carotid bruit. No JVD.	  Cardiovascular: Regular rate and rhythm. S1 S2 normal. No murmurs, rubs or gallops.  Respiratory: Respirations unlabored. Decreased breath sounds at the right base ~ 1/3 up with dullness to percussion. No curvature.  Abdomen: Soft. Non-tender. Non-distended. No organomegaly. No masses. Normal bowel sounds.  Extremities: Warm to touch. No clubbing or cyanosis. Resolved pretibial edema right > left. Lower extremity venous stasis changes  Pulses: 2+ peripheral pulses all extremities.	  Skin: Normal skin color. No rashes or lesions. No ecchymoses. No cyanosis. Warm to touch.  Lymph Nodes: Cervical, supraclavicular and axillary nodes normal  Neurological: Motor and sensory examination equal and normal. A and O x 3  Psychiatry: Appropriate mood and affect.    LABS:                          11.0   8.88  )-----------( 362      ( 2020 07:18 )             34.6     CBC    WBC  8.88 <==, 8.82 <==, 9.76 <==, 8.25 <==    Hemoglobin  11.0 <<==, 11.5 <<==, 11.1 <<==, 10.8 <<==    Hematocrit  34.6 <==, 35.7 <==, 35.3 <==, 33.9 <==    Platelets  362 <==, 368 <==, 363 <==, 354 <==      134<L>  |  95<L>  |  30<H>  ----------------------------<  96      4.8   |  28  |  2.03<H>      LYTES    sodium  134 <==, 133 <==, 135 <==, 135 <==    potassium   4.8 <==, 3.3 <==, 4.0 <==, 4.4 <==    chloride  95 <==, 92 <==, 97 <==, 99 <==    carbon dioxide  28 <==, 27 <==, 24 <==, 25 <==    =============================================================================================  RENAL FUNCTION:    Creatinine:   2.03  <<==, 1.55  <<==, 1.49  <<==, 1.74  <<==    BUN:   30 <==, 25 <==, 27 <==, 31 <==    ============================================================================================    calcium   9.2 <==, 9.1 <==, 9.2 <==, 8.9 <==    phos   3.3 <==    mag   2.2 <==    ============================================================================================  LFTs    AST:   34 <== , 31 <==     ALT:  21  <== , 18  <==     AP:  93  <=, 89  <=    Bili:  1.0  <=, 0.8  <=      PT/INR - ( 31 Oct 2020 19:56 )   PT: 15.0 sec;   INR: 1.26 ratio      Procalcitonin, Serum: 0.15 ng/mL ( @ 07:06)    Serum Pro-Brain Natriuretic Peptide: 3619 pg/mL ( @ 07:18)  Serum Pro-Brain Natriuretic Peptide: 2696 pg/mL (10-31 @ 19:56)    CARDIAC MARKERS ( 2020 07:06 )  CPK x     /CKMB x     /CKMB Units x        troponin 71 ng/L  CARDIAC MARKERS ( 2020 00:27 )  CPK x     /CKMB x     /CKMB Units x        troponin 70 ng/L    CARDIAC MARKERS ( 31 Oct 2020 19:56 )  CPK x     /CKMB x     /CKMB Units x        troponin 72 ng/L    < from: Transthoracic Echocardiogram (20 @ 08:20) >    Patient name: CLEMENTE BULLOCK  YOB: 1925   Age: 89 (M)   MR#: [id number]  Study Date: 11/3/2020  Location: [address] [place], [place] [id number]: Sheridan Hernandez RDCS  Study quality: Technically fair  Referring Physician: Kofi Sanchez MD  Blood Pressure: 156/67 mmHg  Height: 168 cm  Weight: 64 kg  BSA: 1.7 m2  Heart Rhythm: Atrial flutter  ------------------------------------------------------------------------  PROCEDURE: Transthoracic echocardiogram with 2-D, M-Mode  and complete spectral and color flow Doppler.  INDICATION: Dyspnea, unspecified (R06.00)  ------------------------------------------------------------------------  Dimensions:    Normal Values:  LA:     4.5    2.0 - 4.0 cm  Ao:     2.7    2.0 - 3.8 cm  SEPTUM: 1.3    0.6 - 1.2 cm  PWT:    1.3    0.6 - 1.1 cm  LVIDd:  4.3    3.0 - 5.6 cm  LVIDs:  2.5    1.8 - 4.0 cm  Derived variables:  LVMI: 121 g/m2  RWT: 0.60  Fractional short: 42 %  EF (Visual Estimate): 75 %  Doppler Peak Velocity (m/sec): AoV=1.7 TV=3.2  ------------------------------------------------------------------------  Observations:  Mitral Valve: Normal mitral valve. Mitral annular  calcification. Mild mitral regurgitation.  Aortic Valve/Aorta: Calcified aortic valve with normal  opening.  Normal aortic root size.  Left Atrium: Moderate left atrial enlargement.  Left Ventricle: Normal left ventricular internal  dimensions. Mild-moderate concentric hypertrophy.  Hyperdynamic left ventricle.  Right Heart: Mild right atrial enlargement. Normal right  ventricular size and function.  A device wire is noted in the right heart.  Normal tricuspid valve. Mild tricuspid regurgitation.  Normal pulmonic valve. Mild pulmonic regurgitation.  Pericardium/Pleura: Normal pericardium with no pericardial  effusion.  Hemodynamic: Estimated right atrial pressure is normal.  Mild-moderate pulmonary hypertension. Estimated PASP 45  mmHg.  ------------------------------------------------------------------------  Conclusions:  Hyperdynamic left ventricle.  Mild-moderate pulmonary hypertension.  A device wire is noted in the right heart.  ------------------------------------------------------------------------  Confirmed on  11/3/2020 - 10:17:57 by Miguel Dolan MD, FASE  ------------------------------------------------------------------------    < end of copied text >  ---------------------------------------------------------------------------------------------------------------    MICROBIOLOGY:     Legionella pneumophila Antigen, Urine (20 @ 13:40)   Legionella Antigen, Urine: Negative: Negative Testing method: Immunochromatographic Assay. L. pneumpohila   serogroup 1 antigen in urine NOT detected, suggesting NO recent or   current infection. Infection due to Legionella cannot be ruled out: other   serogroups and species may cause disease, antigen may not be present in   urine in early infection, or the level of antigens in urine may be below   the detection limit of the test. Order “Culture –Legionella” is   recommended for uncommon cases of suspected Legionella pneumonia due to   organisms other than L. pneumophila serogroup 1.   Urinalysis Basic - ( 31 Oct 2020 20:28 )    Color: Light Yellow / Appearance: Clear / S.012 / pH: x  Gluc: x / Ketone: Negative  / Bili: Negative / Urobili: Negative   Blood: x / Protein: Negative / Nitrite: Negative   Leuk Esterase: Negative / RBC: x / WBC x   Sq Epi: x / Non Sq Epi: x / Bacteria: x    Culture - Urine (20 @ 02:06)   Specimen Source: .Urine Clean Catch (Midstream)   Culture Results:   <10,000 CFU/mL Normal Urogenital Mimi   ---------------------------------------------------------------------------------------------------------------  Culture - Blood (10.31.20 @ 22:12)   Specimen Source: .Blood Blood-Peripheral   Culture Results:   No growth to date.   ---------------------------------------------------------------------------------------------------------------  Culture - Blood (10.31.20 @ 22:12)   Specimen Source: .Blood Blood-Peripheral   Culture Results:   No growth to date.   ---------------------------------------------------------------------------------------------------------------    RADIOLOGY:  [x] Chest radiographs reviewed and interpreted by me    < from: Xray Chest 1 View- PORTABLE-Urgent (10.31.20 @ 19:56) >    EXAM:  XR CHEST PORTABLE URGENT 1V                          PROCEDURE DATE:  10/31/2020      INTERPRETATION:  CLINICAL INFORMATION: Sepsis.    TECHNIQUE: Frontal radiograph of the chest.    COMPARISON: Chest x-ray dated 2013.    FINDINGS:    LUNGS and pleura: Elevation of the right hemidiaphragm that is new when compared with prior. Small right-sided pleural effusion with adjacent atelectasis. No pneumothorax.    HEART AND MEDIASTINUM: Cardiomediastinal silhouette within normal limits. There is a left-sided dual-lead pacemaker.    SKELETON: Unremarkable skeletal structures.      IMPRESSION:    Elevation of the right hemidiaphragm.  Small right-sided pleural effusion with adjacent atelectasis.      KASANDRA NEWBY MD; Resident Radiology  This document has been electronically signed.  ALTAF ESPINOZA MD; Attending Radiologist  This document has been electronically signed. 2020  8:25AM    < end of copied text >  ---------------------------------------------------------------------------------------------------------------    < from: CT Chest No Cont (20 @ 13:24) >    EXAM:  CT CHEST                          PROCEDURE DATE:  2020      INTERPRETATION:  CLINICAL INDICATION: Shortness of breath, CHF, coronary artery disease.    Axial CT images of the chest are obtained without intravenous administration of contrast.    No prior chest CTs are available for comparison.    Left cardiac device with the leads terminating within the right atrium and right ventricle.    No enlarged axillary, mediastinal or hilar lymph nodes. Multiple subcentimeter nonspecific mediastinal lymph nodes. Cardiomegaly. No pericardial effusion. Diffuse atherosclerotic disease with involvement of the aorta and the coronary arteries. Aortic valve calcifications.    Evaluation of the upper abdomen demonstrate nodularity of the hepatic surface may represent cirrhosis.    Small bilateral pleural effusions, right greater than left containing areas of loculation on the right.    Evaluation of the lung parenchyma demonstrate bilateral interlobular septal thickening, right greater than left suggestive of pulmonary edema. Right lower lobe partial compressive atelectasis. 2 mm bilateral calcified granulomas.    Left lower lobe peripheral curvilinear opacity, part of which measures about 2.1 x 1.2 cm on image 392 of series 4 with some associated calcification. Some surrounding left lower lobe parenchymal lucency is noted which can be seen in the setting of pulmonary air entrapment.    No central endobronchial lesions. Secretions within the trachea.    Degenerative changes of the spine and the shoulders.    IMPRESSION: Small bilateral pleural effusions, right greater than left with interlobular septal thickening suggestive of pulmonary edema. Cardiomegaly with coronary artery atherosclerotic disease.    Right lower lobe partial compressive atelectasis. Please note evaluation of the atelectatic portion of the right lower lobe is limited due to lack of intravenous contrast.    Left lower lobe peripheral 2.1 x 1.2 cm curvilinear nodule is of unclear etiology may be due to impacted dilated airway given surrounding lung parenchymal lucency.    3 month follow-up chest CT is recommended for further evaluation of the above findings.    TRISTAN REA MD; Attending Radiologist  This document has been electronically signed. 2020  1:46PM    < end of copied text >  ---------------------------------------------------------------------------------------------------------------  < from: [facility] Duplex Lower Ext Vein Scan, Bildayday (20 @ 11:59) >    EXAM:  DUPLEX SCAN EXT VEINS LOWER BI                          PROCEDURE DATE:  2020      INTERPRETATION:  CLINICAL INFORMATION: Bilateral lower extremity swelling, rule out DVT.    COMPARISON: None available.    TECHNIQUE: Duplex sonography of the BILATERAL LOWER extremity veins with color and spectral Doppler, with and without compression.    FINDINGS:    There is normal compressibility of the bilateral common femoral, femoral and popliteal veins.  Doppler examination shows normal spontaneous and phasic flow.    No calf vein thrombosis is detected.    IMPRESSION:  No evidence of deep venous thrombosis in either lower extremity.    GUDELIA MEEKS M.D., ATTENDING RADIOLOGIST  This document has been electronically signed. 2020 12:13PM    < end of copied text >  ---------------------------------------------------------------------------------------------------------------

## 2020-11-04 NOTE — PROGRESS NOTE ADULT - SUBJECTIVE AND OBJECTIVE BOX
Patient is a 89y old  Male who presents with a chief complaint of hypoxia (03 Nov 2020 21:32)      SUBJECTIVE / OVERNIGHT EVENTS: Comfortable without new complaints.   Review of Systems  chest pain no  palpitations no  sob improving   nausea no  headache no    MEDICATIONS  (STANDING):  amLODIPine   Tablet 2.5 milliGRAM(s) Oral daily  aspirin enteric coated 81 milliGRAM(s) Oral daily  atorvastatin 20 milliGRAM(s) Oral at bedtime  finasteride 5 milliGRAM(s) Oral daily  isosorbide   mononitrate ER Tablet (IMDUR) 30 milliGRAM(s) Oral daily  levothyroxine 50 MICROGram(s) Oral daily  metoprolol succinate ER 50 milliGRAM(s) Oral daily  simvastatin 10 milliGRAM(s) Oral at bedtime    MEDICATIONS  (PRN):  acetaminophen   Tablet .. 650 milliGRAM(s) Oral every 8 hours PRN Mild Pain (1 - 3)      Vital Signs Last 24 Hrs  T(C): 36.8 (04 Nov 2020 11:06), Max: 36.8 (03 Nov 2020 20:43)  T(F): 98.2 (04 Nov 2020 11:06), Max: 98.2 (03 Nov 2020 20:43)  HR: 60 (04 Nov 2020 11:06) (60 - 68)  BP: 123/62 (04 Nov 2020 11:06) (111/67 - 133/57)  BP(mean): --  RR: 18 (04 Nov 2020 11:06) (18 - 18)  SpO2: 96% (04 Nov 2020 11:06) (93% - 98%)    PHYSICAL EXAM:  GENERAL: NAD, well-developed  HEAD:  Atraumatic, Normocephalic  EYES: EOMI, PERRLA, conjunctiva and sclera clear  NECK: Supple, No JVD  CHEST/LUNG: Clear to auscultation bilaterally; No wheeze  HEART: Regular rate and rhythm; No murmurs, rubs, or gallops  ABDOMEN: Soft, Nontender, Nondistended; Bowel sounds present  EXTREMITIES:  2+ Peripheral Pulses, No clubbing, cyanosis, or edema  PSYCH: AAOx3  NEUROLOGY: non-focal  SKIN: No rashes or lesions    LABS:                        11.0   8.88  )-----------( 362      ( 04 Nov 2020 07:18 )             34.6     11-04    134<L>  |  95<L>  |  30<H>  ----------------------------<  96  4.8   |  28  |  2.03<H>    Ca    9.2      04 Nov 2020 07:18                  RADIOLOGY & ADDITIONAL TESTS:    Imaging Personally Reviewed:  < from: Xray Chest 1 View- PORTABLE-Routine (Xray Chest 1 View- PORTABLE-Routine in AM.) (11.04.20 @ 09:18) >  IMPRESSION: Unchanged bilateral pleural effusions, right greater than left.      < end of copied text >    Consultant(s) Notes Reviewed:      Care Discussed with Consultants/Other Providers:

## 2020-11-04 NOTE — PROGRESS NOTE ADULT - ASSESSMENT
89 yo male with PMH of HTN, HLD, Bradycardia s/p PPM, on aspirin and eliquis, hypothyroidism, BPH, presents here from assisted living for hypoxia.     Acute respiratory failure with hypoxia.  - CHF exacerbation  - discontinue ceftriaxone, azithromycin  - echocardiogram pending   - c/w IV diuretics.   - Pulmonary evaluation noted  - possible thoracentesis for pleural effusion    CHF exacerbation.   - Patient with right pleural effusion, JVD, b/l LE edema, elevated BNP  - Patient has elevated troponin, possibly 2/2 to CHF vs. RJ  - continue IV Lasix 40mg q12 for now  - echocardiogram- Cardiology evaluation noted    Hypothyroidism (acquired).  - c/w synthroid. Increase Synthroid dose to 50 mcg.     Hypertension.  - c/w isosorbide mononitrate, metoprolol, Norvasc Lasix     Hyperlipidemia.   - c/w atorvastatin.    BPH (benign prostatic hyperplasia).   - c/w finasteride.     Leg edema.  - b/l LE edema, likely 2/2 to heart failure  - VA duplex no DVT     H/O cardiac arrhythmia.    - ? hx of arrhythmia s/p PPM  - pacemaker interrogation  - hold Eliquis for thoracentesis   - aspirin.     Prophylactic measure.  - Heparin while off Eliquis    PT    Kofi Sanchez MD pager [phone]

## 2020-11-04 NOTE — PROGRESS NOTE ADULT - PROBLEM SELECTOR PLAN 6
PPM interrogation- sustained  AFIB , otherwise well functioning device PPM interrogation- sustained  AFIB , otherwise well functioning device set [id number].     with LV hyperdynamic, will decrease rest rate to 60 BPM.

## 2020-11-05 LAB
ANION GAP SERPL CALC-SCNC: 13 MMOL/L — SIGNIFICANT CHANGE UP (ref 5–17)
BUN SERPL-MCNC: 33 MG/DL — HIGH (ref 7–23)
CALCIUM SERPL-MCNC: 9.3 MG/DL — SIGNIFICANT CHANGE UP (ref 8.4–10.5)
CHLORIDE SERPL-SCNC: 93 MMOL/L — LOW (ref 96–108)
CHOLEST SERPL-MCNC: 105 MG/DL — SIGNIFICANT CHANGE UP
CO2 SERPL-SCNC: 24 MMOL/L — SIGNIFICANT CHANGE UP (ref 22–31)
CREAT SERPL-MCNC: 1.79 MG/DL — HIGH (ref 0.5–1.3)
CULTURE RESULTS: SIGNIFICANT CHANGE UP
CULTURE RESULTS: SIGNIFICANT CHANGE UP
GLUCOSE SERPL-MCNC: 117 MG/DL — HIGH (ref 70–99)
HCT VFR BLD CALC: 34.6 % — LOW (ref 39–50)
HDLC SERPL-MCNC: 43 MG/DL — SIGNIFICANT CHANGE UP
HGB BLD-MCNC: 11.1 G/DL — LOW (ref 13–17)
LIPID PNL WITH DIRECT LDL SERPL: 45 MG/DL — SIGNIFICANT CHANGE UP
MCHC RBC-ENTMCNC: 30.2 PG — SIGNIFICANT CHANGE UP (ref 27–34)
MCHC RBC-ENTMCNC: 32.1 GM/DL — SIGNIFICANT CHANGE UP (ref 32–36)
MCV RBC AUTO: 94.3 FL — SIGNIFICANT CHANGE UP (ref 80–100)
NON HDL CHOLESTEROL: 62 MG/DL — SIGNIFICANT CHANGE UP
NRBC # BLD: 0 /100 WBCS — SIGNIFICANT CHANGE UP (ref 0–0)
PLATELET # BLD AUTO: 348 K/UL — SIGNIFICANT CHANGE UP (ref 150–400)
POTASSIUM SERPL-MCNC: 4.8 MMOL/L — SIGNIFICANT CHANGE UP (ref 3.5–5.3)
POTASSIUM SERPL-SCNC: 4.8 MMOL/L — SIGNIFICANT CHANGE UP (ref 3.5–5.3)
RBC # BLD: 3.67 M/UL — LOW (ref 4.2–5.8)
RBC # FLD: 16.5 % — HIGH (ref 10.3–14.5)
SODIUM SERPL-SCNC: 130 MMOL/L — LOW (ref 135–145)
SPECIMEN SOURCE: SIGNIFICANT CHANGE UP
SPECIMEN SOURCE: SIGNIFICANT CHANGE UP
TRIGL SERPL-MCNC: 88 MG/DL — SIGNIFICANT CHANGE UP
WBC # BLD: 9.22 K/UL — SIGNIFICANT CHANGE UP (ref 3.8–10.5)
WBC # FLD AUTO: 9.22 K/UL — SIGNIFICANT CHANGE UP (ref 3.8–10.5)

## 2020-11-05 PROCEDURE — 71045 X-RAY EXAM CHEST 1 VIEW: CPT | Mod: 26

## 2020-11-05 PROCEDURE — 76770 US EXAM ABDO BACK WALL COMP: CPT | Mod: 26

## 2020-11-05 PROCEDURE — 71045 X-RAY EXAM CHEST 1 VIEW: CPT | Mod: 26,77

## 2020-11-05 RX ADMIN — Medication 50 MICROGRAM(S): at 05:34

## 2020-11-05 RX ADMIN — HEPARIN SODIUM 5000 UNIT(S): 5000 INJECTION INTRAVENOUS; SUBCUTANEOUS at 17:42

## 2020-11-05 RX ADMIN — ISOSORBIDE MONONITRATE 60 MILLIGRAM(S): 60 TABLET, EXTENDED RELEASE ORAL at 13:04

## 2020-11-05 RX ADMIN — Medication 81 MILLIGRAM(S): at 13:04

## 2020-11-05 RX ADMIN — FINASTERIDE 5 MILLIGRAM(S): 5 TABLET, FILM COATED ORAL at 13:04

## 2020-11-05 RX ADMIN — ATORVASTATIN CALCIUM 20 MILLIGRAM(S): 80 TABLET, FILM COATED ORAL at 22:28

## 2020-11-05 RX ADMIN — Medication 100 MILLIGRAM(S): at 05:34

## 2020-11-05 NOTE — PROGRESS NOTE ADULT - SUBJECTIVE AND OBJECTIVE BOX
Patient is a 89y old  Male who presents with a chief complaint of hypoxia (04 Nov 2020 20:25)      SUBJECTIVE / OVERNIGHT EVENTS: No new complaints.   Review of Systems  chest pain no  palpitations no  sob no  nausea no  headache no    MEDICATIONS  (STANDING):  aspirin enteric coated 81 milliGRAM(s) Oral daily  atorvastatin 20 milliGRAM(s) Oral at bedtime  finasteride 5 milliGRAM(s) Oral daily  heparin   Injectable 5000 Unit(s) SubCutaneous every 12 hours  isosorbide   mononitrate ER Tablet (IMDUR) 60 milliGRAM(s) Oral daily  levothyroxine 50 MICROGram(s) Oral daily  metoprolol succinate  milliGRAM(s) Oral daily  simvastatin 10 milliGRAM(s) Oral at bedtime    MEDICATIONS  (PRN):  acetaminophen   Tablet .. 650 milliGRAM(s) Oral every 8 hours PRN Mild Pain (1 - 3)      Vital Signs Last 24 Hrs  T(C): 36.6 (05 Nov 2020 16:29), Max: 36.9 (05 Nov 2020 05:32)  T(F): 97.9 (05 Nov 2020 16:29), Max: 98.4 (05 Nov 2020 05:32)  HR: 60 (05 Nov 2020 16:29) (60 - 66)  BP: 107/61 (05 Nov 2020 16:29) (107/61 - 144/66)  BP(mean): --  RR: 18 (05 Nov 2020 16:29) (18 - 18)  SpO2: 96% (05 Nov 2020 16:29) (88% - 96%)    PHYSICAL EXAM:  GENERAL: NAD  HEAD:  Atraumatic, Normocephalic  EYES: EOMI, PERRLA, conjunctiva and sclera clear  NECK: Supple, No JVD  CHEST/LUNG: decreased basal BS to auscultation bilaterally   HEART: Regular rate and rhythm; No murmurs, rubs, or gallops  ABDOMEN: Soft, Nontender, Nondistended; Bowel sounds present  EXTREMITIES:  2+ Peripheral Pulses, No clubbing, cyanosis, or edema  PSYCH: AAOx3  NEUROLOGY: non-focal  SKIN: No rashes or lesions    LABS:                        11.1   9.22  )-----------( 348      ( 05 Nov 2020 07:26 )             34.6     11-05    130<L>  |  93<L>  |  33<H>  ----------------------------<  117<H>  4.8   |  24  |  1.79<H>    Ca    9.3      05 Nov 2020 07:25                  RADIOLOGY & ADDITIONAL TESTS:    Imaging Personally Reviewed:    Consultant(s) Notes Reviewed:      Care Discussed with Consultants/Other Providers:

## 2020-11-05 NOTE — PROGRESS NOTE ADULT - SUBJECTIVE AND OBJECTIVE BOX
[facility]  DATE: 11/5/2020    PROCEDURE: THORACENTESIS    INDICATION: PLEURAL EFFUSION                    [ ] DIAGNOSTIC                    [x] THERAPEUTIC                            [x] r/o CHF                                    [ ] r/o neoplasm                   [ ] r/o empyema                    [ ] r/o hemothorax      PRE-PROCEDURE  Informed consent was obtained after the risks and benefits of the procedure were explained. Risks described included but were not limited to bleeding, cough, pneumothorax (potentially requiring chest tube placement), vaso-vagal reactions, chest pain and death.    A TIME OUT was performed prior to the procedure with verbal confirmation of correct patient identity, correct site, availability of necessary equipment, and accuracy of the consent form. Safety precautions based on the patient's history and medication use have been addressed.    The patient was placed in a sitting position at the edge of the bed with arms resting on a table. After localization of the pleural fluid using ultrasound guidance, the posterior     [ ] left    [x] right      chest wall was prepped and draped with strict adherence to aseptic technique. Xylocaine solution was used for local anesthesia. A thoracentesis catheter was then advanced into the pleural space.       Approximately   1400 cc  of                [x] clear yellow          [ ] straw colored          [ ] serosanguinous          [ ] blood-tinged          [ ] bloody          [ ] purulent          [ ] chylous          [ ] cloudy     fluid was removed.    The patient tolerated the procedure well and there were no immediate complications. Post-procedure vital signs were stable.    [ ] Specimens were sent for cytology, AFB smear and culture, fungal culture, routine culture, gram stain, cell count, pH and chemistry.    [x] Specimens were not sent.    [x] A post-procedure CXR to r/o PTX has been ordered and will be checked.        Aneesh Salter MD, [facility]  [phone]

## 2020-11-05 NOTE — PROGRESS NOTE ADULT - SUBJECTIVE AND OBJECTIVE BOX
[facility] - PROGRESS NOTE    CHIEF COMPLAINT: hypoxemia; pleural effusions; atelectasis; lung nodule    INTERVAL HISTORY: stress test postponed due to hypoxemia on room air at rest -> 84% without shortness of breath;  resolved lower extremity swelling right > left; no cough, sputum production, chest congestion or wheeze; no fevers, chills or sweats; no chest pain/pressure or palpitations; improved renal function off diuretics    REVIEW OF SYSTEMS:  Constitutional: As per interval history  HEENT: Within normal limits  CV: As per interval history  Resp: As per interval history  GI: Within normal limits   : Within normal limits  Musculoskeletal: Within normal limits  Skin: Within normal limits  Neurological: Within normal limits  Psychiatric: Within normal limits  Endocrine: Within normal limits  Hematologic/Lymphatic: Within normal limits  Allergic/Immunologic: Within normal limits    MEDICATIONS:     Pulmonary "    Anti-microbials:    Cardiovascular:  isosorbide   mononitrate ER Tablet (IMDUR) 60 milliGRAM(s) Oral daily  metoprolol succinate  milliGRAM(s) Oral daily    Other:  aspirin enteric coated 81 milliGRAM(s) Oral daily  atorvastatin 20 milliGRAM(s) Oral at bedtime  finasteride 5 milliGRAM(s) Oral daily  heparin   Injectable 5000 Unit(s) SubCutaneous every 12 hours  levothyroxine 50 MICROGram(s) Oral daily  simvastatin 10 milliGRAM(s) Oral at bedtime    MEDICATIONS  (PRN):  acetaminophen   Tablet .. 650 milliGRAM(s) Oral every 8 hours PRN Mild Pain (1 - 3)        OBJECTIVE:    I&O's Detail    2020 07:01  -  2020 07:00  --------------------------------------------------------  IN:    Oral Fluid: 1040 mL  Total IN: 1040 mL    OUT:    Voided (mL): 500 mL  Total OUT: 500 mL    Total NET: 540 mL      Daily Weight in k.2 (2020 05:32)    PHYSICAL EXAM:       ICU Vital Signs Last 24 Hrs  T(C): 36.9 (2020 05:32), Max: 36.9 (2020 05:32)  T(F): 98.4 (2020 05:32), Max: 98.4 (2020 05:32)  HR: 66 (2020 05:32) (60 - 66)  BP: 144/66 (2020 05:32) (121/70 - 144/66)  BP(mean): --  ABP: --  ABP(mean): --  RR: 18 (2020 05:32) (18 - 18)  SpO2: 92% (2020 05:32) (88% - 92%) -> 84% on room air at rest     General: Awake. Alert. Cooperative. No distress. Appears stated age.	  HEENT: Atraumatic. Normocephalic. Anicteric. Normal oral mucosa. PERRL. EOMI.  Neck: Supple. Trachea midline. Thyroid without enlargement/tenderness/nodules. No carotid bruit. No JVD.	  Cardiovascular: Regular rate and rhythm. S1 S2 normal. No murmurs, rubs or gallops.  Respiratory: Respirations unlabored. Decreased breath sounds at the right base ~ 1/3 up with dullness to percussion. No curvature.  Abdomen: Soft. Non-tender. Non-distended. No organomegaly. No masses. Normal bowel sounds.  Extremities: Warm to touch. No clubbing or cyanosis. Resolved pretibial edema right > left. Lower extremity venous stasis changes  Pulses: 2+ peripheral pulses all extremities.	  Skin: Normal skin color. No rashes or lesions. No ecchymoses. No cyanosis. Warm to touch.  Lymph Nodes: Cervical, supraclavicular and axillary nodes normal  Neurological: Motor and sensory examination equal and normal. A and O x 3  Psychiatry: Appropriate mood and affect.    LABS:                          11.1   9.22  )-----------( 348      ( 2020 07:26 )             34.6     CBC    WBC  9.22 <==, 8.88 <==, 8.82 <==, 9.76 <==, 8.25 <==    Hemoglobin  11.1 <<==, 11.0 <<==, 11.5 <<==, 11.1 <<==, 10.8 <<==    Hematocrit  34.6 <==, 34.6 <==, 35.7 <==, 35.3 <==, 33.9 <==    Platelets  348 <==, 362 <==, 368 <==, 363 <==, 354 <==      130<L>  |  93<L>  |  33<H>  ----------------------------<  117<H>    11-05  4.8   |  24  |  1.79<H>      LYTES    sodium  130 <==, 134 <==, 133 <==, 135 <==, 135 <==    potassium   4.8 <==, 4.8 <==, 3.3 <==, 4.0 <==, 4.4 <==    chloride  93 <==, 95 <==, 92 <==, 97 <==, 99 <==    carbon dioxide  24 <==, 28 <==, 27 <==, 24 <==, 25 <==    =============================================================================================  RENAL FUNCTION:    Creatinine:   1.79  <<==, 2.03  <<==, 1.55  <<==, 1.49  <<==, 1.74  <<==    BUN:   33 <==, 30 <==, 25 <==, 27 <==, 31 <==    ============================================================================================    calcium   9.3 <==, 9.2 <==, 9.1 <==, 9.2 <==, 8.9 <==    phos   3.3 <==    mag   2.2 <==    ============================================================================================  LFTs    AST:   34 <== , 31 <==     ALT:  21  <== , 18  <==     AP:  93  <=, 89  <=    Bili:  1.0  <=, 0.8  <=    PT/INR - ( 31 Oct 2020 19:56 )   PT: 15.0 sec;   INR: 1.26 ratio      Procalcitonin, Serum: 0.15 ng/mL ( @ 07:06)    Serum Pro-Brain Natriuretic Peptide: 3619 pg/mL ( @ 07:18)  Serum Pro-Brain Natriuretic Peptide: 2696 pg/mL (10-31 @ 19:56)    CARDIAC MARKERS ( 2020 07:06 )  CPK x     /CKMB x     /CKMB Units x        troponin 71 ng/L    CARDIAC MARKERS ( 2020 00:27 )  CPK x     /CKMB x     /CKMB Units x        troponin 70 ng/L    CARDIAC MARKERS ( 31 Oct 2020 19:56 )  CPK x     /CKMB x     /CKMB Units x        troponin 72 ng/L    < from: Transthoracic Echocardiogram (20 @ 08:20) >    Patient name: CLEMENTE BULLOCK  YOB: 1925   Age: 89 (M)   MR#: [id number]  Study Date: 11/3/2020  [id number] Sheridan Hernandez RDCS  Study quality: Technically fair  Referring Physician: Kofi Sanchez MD  Blood Pressure: 156/67 mmHg  Height: 168 cm  Weight: 64 kg  BSA: 1.7 m2  Heart Rhythm: Atrial flutter  ------------------------------------------------------------------------  PROCEDURE: Transthoracic echocardiogram with 2-D, M-Mode  and complete spectral and color flow Doppler.  INDICATION: Dyspnea, unspecified (R06.00)  ------------------------------------------------------------------------  Dimensions:    Normal Values:  LA:     4.5    2.0 - 4.0 cm  Ao:     2.7    2.0 - 3.8 cm  SEPTUM: 1.3    0.6 - 1.2 cm  PWT:    1.3    0.6 - 1.1 cm  LVIDd:  4.3    3.0 - 5.6 cm  LVIDs:  2.5    1.8 - 4.0 cm  Derived variables:  LVMI: 121 g/m2  RWT: 0.60  Fractional short: 42 %  EF (Visual Estimate): 75 %  Doppler Peak Velocity (m/sec): AoV=1.7 TV=3.2  ------------------------------------------------------------------------  Observations:  Mitral Valve: Normal mitral valve. Mitral annular  calcification. Mild mitral regurgitation.  Aortic Valve/Aorta: Calcified aortic valve with normal  opening.  Normal aortic root size.  Left Atrium: Moderate left atrial enlargement.  Left Ventricle: Normal left ventricular internal  dimensions. Mild-moderate concentric hypertrophy.  Hyperdynamic left ventricle.  Right Heart: Mild right atrial enlargement. Normal right  ventricular size and function.  A device wire is noted in the right heart.  Normal tricuspid valve. Mild tricuspid regurgitation.  Normal pulmonic valve. Mild pulmonic regurgitation.  Pericardium/Pleura: Normal pericardium with no pericardial  effusion.  Hemodynamic: Estimated right atrial pressure is normal.  Mild-moderate pulmonary hypertension. Estimated PASP 45  mmHg.  ------------------------------------------------------------------------  Conclusions:  Hyperdynamic left ventricle.  Mild-moderate pulmonary hypertension.  A device wire is noted in the right heart.  ------------------------------------------------------------------------  Confirmed on  11/3/2020 - 10:17:57 by Miguel Dolan MD, FASE  ------------------------------------------------------------------------    < end of copied text >  ---------------------------------------------------------------------------------------------------------------    MICROBIOLOGY:     Legionella pneumophila Antigen, Urine (20 @ 13:40)   Legionella Antigen, Urine: Negative: Negative Testing method: Immunochromatographic Assay. L. pneumpohila   serogroup 1 antigen in urine NOT detected, suggesting NO recent or   current infection. Infection due to Legionella cannot be ruled out: other   serogroups and species may cause disease, antigen may not be present in   urine in early infection, or the level of antigens in urine may be below   the detection limit of the test. Order “Culture –Legionella” is   recommended for uncommon cases of suspected Legionella pneumonia due to   organisms other than L. pneumophila serogroup 1.   Urinalysis Basic - ( 31 Oct 2020 20:28 )    Color: Light Yellow / Appearance: Clear / S.012 / pH: x  Gluc: x / Ketone: Negative  / Bili: Negative / Urobili: Negative   Blood: x / Protein: Negative / Nitrite: Negative   Leuk Esterase: Negative / RBC: x / WBC x   Sq Epi: x / Non Sq Epi: x / Bacteria: x    Culture - Urine (20 @ 02:06)   Specimen Source: .Urine Clean Catch (Midstream)   Culture Results:   <10,000 CFU/mL Normal Urogenital Mimi   ---------------------------------------------------------------------------------------------------------------  Culture - Blood (10.31.20 @ 22:12)   Specimen Source: .Blood Blood-Peripheral   Culture Results:   No growth to date.   ---------------------------------------------------------------------------------------------------------------  Culture - Blood (10.31.20 @ 22:12)   Specimen Source: .Blood Blood-Peripheral   Culture Results:   No growth to date.   ---------------------------------------------------------------------------------------------------------------    RADIOLOGY:  [x] Chest radiographs reviewed and interpreted by me    < from: Xray Chest 1 View- PORTABLE-Urgent (10.31.20 @ 19:56) >    EXAM:  XR CHEST PORTABLE URGENT 1V                          PROCEDURE DATE:  10/31/2020      INTERPRETATION:  CLINICAL INFORMATION: Sepsis.    TECHNIQUE: Frontal radiograph of the chest.    COMPARISON: Chest x-ray dated 2013.    FINDINGS:    LUNGS and pleura: Elevation of the right hemidiaphragm that is new when compared with prior. Small right-sided pleural effusion with adjacent atelectasis. No pneumothorax.    HEART AND MEDIASTINUM: Cardiomediastinal silhouette within normal limits. There is a left-sided dual-lead pacemaker.    SKELETON: Unremarkable skeletal structures.      IMPRESSION:    Elevation of the right hemidiaphragm.  Small right-sided pleural effusion with adjacent atelectasis.      KASANDRA NEWBY MD; Resident Radiology  This document has been electronically signed.  ALTAF ESPINOZA MD; Attending Radiologist  This document has been electronically signed. 2020  8:25AM    < end of copied text >  ---------------------------------------------------------------------------------------------------------------    < from: CT Chest No Cont (20 @ 13:24) >    EXAM:  CT CHEST                          PROCEDURE DATE:  2020      INTERPRETATION:  CLINICAL INDICATION: Shortness of breath, CHF, coronary artery disease.    Axial CT images of the chest are obtained without intravenous administration of contrast.    No prior chest CTs are available for comparison.    Left cardiac device with the leads terminating within the right atrium and right ventricle.    No enlarged axillary, mediastinal or hilar lymph nodes. Multiple subcentimeter nonspecific mediastinal lymph nodes. Cardiomegaly. No pericardial effusion. Diffuse atherosclerotic disease with involvement of the aorta and the coronary arteries. Aortic valve calcifications.    Evaluation of the upper abdomen demonstrate nodularity of the hepatic surface may represent cirrhosis.    Small bilateral pleural effusions, right greater than left containing areas of loculation on the right.    Evaluation of the lung parenchyma demonstrate bilateral interlobular septal thickening, right greater than left suggestive of pulmonary edema. Right lower lobe partial compressive atelectasis. 2 mm bilateral calcified granulomas.    Left lower lobe peripheral curvilinear opacity, part of which measures about 2.1 x 1.2 cm on image 392 of series 4 with some associated calcification. Some surrounding left lower lobe parenchymal lucency is noted which can be seen in the setting of pulmonary air entrapment.    No central endobronchial lesions. Secretions within the trachea.    Degenerative changes of the spine and the shoulders.    IMPRESSION: Small bilateral pleural effusions, right greater than left with interlobular septal thickening suggestive of pulmonary edema. Cardiomegaly with coronary artery atherosclerotic disease.    Right lower lobe partial compressive atelectasis. Please note evaluation of the atelectatic portion of the right lower lobe is limited due to lack of intravenous contrast.    Left lower lobe peripheral 2.1 x 1.2 cm curvilinear nodule is of unclear etiology may be due to impacted dilated airway given surrounding lung parenchymal lucency.    3 month follow-up chest CT is recommended for further evaluation of the above findings.    TRISTAN REA MD; Attending Radiologist  This document has been electronically signed. 2020  1:46PM    < end of copied text >  ---------------------------------------------------------------------------------------------------------------  < from: [facility] Duplex Lower Ext Vein Scan, Bilat (20 @ 11:59) >    EXAM:  DUPLEX SCAN EXT VEINS LOWER BI                          PROCEDURE DATE:  2020      INTERPRETATION:  CLINICAL INFORMATION: Bilateral lower extremity swelling, rule out DVT.    COMPARISON: None available.    TECHNIQUE: Duplex sonography of the BILATERAL LOWER extremity veins with color and spectral Doppler, with and without compression.    FINDINGS:    There is normal compressibility of the bilateral common femoral, femoral and popliteal veins.  Doppler examination shows normal spontaneous and phasic flow.    No calf vein thrombosis is detected.    IMPRESSION:  No evidence of deep venous thrombosis in either lower extremity.    GUDELIA MEEKS M.D., ATTENDING RADIOLOGIST  This document has been electronically signed. 2020 12:13PM    < end of copied text >  ---------------------------------------------------------------------------------------------------------------       [facility] - PROGRESS NOTE    CHIEF COMPLAINT: hypoxemia; pleural effusions; atelectasis; lung nodule    INTERVAL HISTORY: stress test postponed due to hypoxemia on room air at rest -> 84% without shortness of breath;  resolved lower extremity swelling right > left; no cough, sputum production, chest congestion or wheeze; no fevers, chills or sweats; no chest pain/pressure or palpitations; improved renal function off diuretics    REVIEW OF SYSTEMS:  Constitutional: As per interval history  HEENT: Within normal limits  CV: As per interval history  Resp: As per interval history  GI: Within normal limits   : Within normal limits  Musculoskeletal: Within normal limits  Skin: Within normal limits  Neurological: Within normal limits  Psychiatric: Within normal limits  Endocrine: Within normal limits  Hematologic/Lymphatic: Within normal limits  Allergic/Immunologic: Within normal limits    MEDICATIONS:     Pulmonary "    Anti-microbials:    Cardiovascular:  isosorbide   mononitrate ER Tablet (IMDUR) 60 milliGRAM(s) Oral daily  metoprolol succinate  milliGRAM(s) Oral daily    Other:  aspirin enteric coated 81 milliGRAM(s) Oral daily  atorvastatin 20 milliGRAM(s) Oral at bedtime  finasteride 5 milliGRAM(s) Oral daily  heparin   Injectable 5000 Unit(s) SubCutaneous every 12 hours  levothyroxine 50 MICROGram(s) Oral daily  simvastatin 10 milliGRAM(s) Oral at bedtime    MEDICATIONS  (PRN):  acetaminophen   Tablet .. 650 milliGRAM(s) Oral every 8 hours PRN Mild Pain (1 - 3)        OBJECTIVE:    I&O's Detail    2020 07:01  -  2020 07:00  --------------------------------------------------------  IN:    Oral Fluid: 1040 mL  Total IN: 1040 mL    OUT:    Voided (mL): 500 mL  Total OUT: 500 mL    Total NET: 540 mL      Daily Weight in k.2 (2020 05:32)    PHYSICAL EXAM:       ICU Vital Signs Last 24 Hrs  T(C): 36.9 (2020 05:32), Max: 36.9 (2020 05:32)  T(F): 98.4 (2020 05:32), Max: 98.4 (2020 05:32)  HR: 66 (2020 05:32) (60 - 66)  BP: 144/66 (2020 05:32) (121/70 - 144/66)  BP(mean): --  ABP: --  ABP(mean): --  RR: 18 (2020 05:32) (18 - 18)  SpO2: 92% (2020 05:32) (88% - 92%) -> 84% on room air at rest     General: Awake. Alert. Cooperative. No distress. Appears stated age.	  HEENT: Atraumatic. Normocephalic. Anicteric. Normal oral mucosa. PERRL. EOMI.  Neck: Supple. Trachea midline. Thyroid without enlargement/tenderness/nodules. No carotid bruit. No JVD.	  Cardiovascular: Regular rate and rhythm. S1 S2 normal. No murmurs, rubs or gallops.  Respiratory: Respirations unlabored. Decreased breath sounds at the right base ~ 1/3 up with dullness to percussion. No curvature.  Abdomen: Soft. Non-tender. Non-distended. No organomegaly. No masses. Normal bowel sounds.  Extremities: Warm to touch. No clubbing or cyanosis. Resolved pretibial edema right > left. Lower extremity venous stasis changes  Pulses: 2+ peripheral pulses all extremities.	  Skin: Normal skin color. No rashes or lesions. No ecchymoses. No cyanosis. Warm to touch.  Lymph Nodes: Cervical, supraclavicular and axillary nodes normal  Neurological: Motor and sensory examination equal and normal. A and O x 3  Psychiatry: Appropriate mood and affect.    LABS:                          11.1   9.22  )-----------( 348      ( 2020 07:26 )             34.6     CBC    WBC  9.22 <==, 8.88 <==, 8.82 <==, 9.76 <==, 8.25 <==    Hemoglobin  11.1 <<==, 11.0 <<==, 11.5 <<==, 11.1 <<==, 10.8 <<==    Hematocrit  34.6 <==, 34.6 <==, 35.7 <==, 35.3 <==, 33.9 <==    Platelets  348 <==, 362 <==, 368 <==, 363 <==, 354 <==      130<L>  |  93<L>  |  33<H>  ----------------------------<  117<H>    11-05  4.8   |  24  |  1.79<H>      LYTES    sodium  130 <==, 134 <==, 133 <==, 135 <==, 135 <==    potassium   4.8 <==, 4.8 <==, 3.3 <==, 4.0 <==, 4.4 <==    chloride  93 <==, 95 <==, 92 <==, 97 <==, 99 <==    carbon dioxide  24 <==, 28 <==, 27 <==, 24 <==, 25 <==    =============================================================================================  RENAL FUNCTION:    Creatinine:   1.79  <<==, 2.03  <<==, 1.55  <<==, 1.49  <<==, 1.74  <<==    BUN:   33 <==, 30 <==, 25 <==, 27 <==, 31 <==    ============================================================================================    calcium   9.3 <==, 9.2 <==, 9.1 <==, 9.2 <==, 8.9 <==    phos   3.3 <==    mag   2.2 <==    ============================================================================================  LFTs    AST:   34 <== , 31 <==     ALT:  21  <== , 18  <==     AP:  93  <=, 89  <=    Bili:  1.0  <=, 0.8  <=    PT/INR - ( 31 Oct 2020 19:56 )   PT: 15.0 sec;   INR: 1.26 ratio      Procalcitonin, Serum: 0.15 ng/mL ( @ 07:06)    Serum Pro-Brain Natriuretic Peptide: 3619 pg/mL ( @ 07:18)  Serum Pro-Brain Natriuretic Peptide: 2696 pg/mL (10-31 @ 19:56)    CARDIAC MARKERS ( 2020 07:06 )  CPK x     /CKMB x     /CKMB Units x        troponin 71 ng/L    CARDIAC MARKERS ( 2020 00:27 )  CPK x     /CKMB x     /CKMB Units x        troponin 70 ng/L    CARDIAC MARKERS ( 31 Oct 2020 19:56 )  CPK x     /CKMB x     /CKMB Units x        troponin 72 ng/L    < from: Transthoracic Echocardiogram (20 @ 08:20) >    Patient name: CLEMENTE BULLOCK  YOB: 1925   Age: 89 (M)   MR#: [id number]  Study Date: 11/3/2020  [id number]: Sheridan Hernandez RDCS  Study quality: Technically fair  Referring Physician: Kofi Sanchez MD  Blood Pressure: 156/67 mmHg  Height: 168 cm  Weight: 64 kg  BSA: 1.7 m2  Heart Rhythm: Atrial flutter  ------------------------------------------------------------------------  PROCEDURE: Transthoracic echocardiogram with 2-D, M-Mode  and complete spectral and color flow Doppler.  INDICATION: Dyspnea, unspecified (R06.00)  ------------------------------------------------------------------------  Dimensions:    Normal Values:  LA:     4.5    2.0 - 4.0 cm  Ao:     2.7    2.0 - 3.8 cm  SEPTUM: 1.3    0.6 - 1.2 cm  PWT:    1.3    0.6 - 1.1 cm  LVIDd:  4.3    3.0 - 5.6 cm  LVIDs:  2.5    1.8 - 4.0 cm  Derived variables:  LVMI: 121 g/m2  RWT: 0.60  Fractional short: 42 %  EF (Visual Estimate): 75 %  Doppler Peak Velocity (m/sec): AoV=1.7 TV=3.2  ------------------------------------------------------------------------  Observations:  Mitral Valve: Normal mitral valve. Mitral annular  calcification. Mild mitral regurgitation.  Aortic Valve/Aorta: Calcified aortic valve with normal  opening.  Normal aortic root size.  Left Atrium: Moderate left atrial enlargement.  Left Ventricle: Normal left ventricular internal  dimensions. Mild-moderate concentric hypertrophy.  Hyperdynamic left ventricle.  Right Heart: Mild right atrial enlargement. Normal right  ventricular size and function.  A device wire is noted in the right heart.  Normal tricuspid valve. Mild tricuspid regurgitation.  Normal pulmonic valve. Mild pulmonic regurgitation.  Pericardium/Pleura: Normal pericardium with no pericardial  effusion.  Hemodynamic: Estimated right atrial pressure is normal.  Mild-moderate pulmonary hypertension. Estimated PASP 45  mmHg.  ------------------------------------------------------------------------  Conclusions:  Hyperdynamic left ventricle.  Mild-moderate pulmonary hypertension.  A device wire is noted in the right heart.  ------------------------------------------------------------------------  Confirmed on  11/3/2020 - 10:17:57 by Miguel Dolan MD, FASE  ------------------------------------------------------------------------    < end of copied text >  ---------------------------------------------------------------------------------------------------------------    MICROBIOLOGY:     Legionella pneumophila Antigen, Urine (20 @ 13:40)   Legionella Antigen, Urine: Negative: Negative Testing method: Immunochromatographic Assay. L. pneumpohila   serogroup 1 antigen in urine NOT detected, suggesting NO recent or   current infection. Infection due to Legionella cannot be ruled out: other   serogroups and species may cause disease, antigen may not be present in   urine in early infection, or the level of antigens in urine may be below   the detection limit of the test. Order “Culture –Legionella” is   recommended for uncommon cases of suspected Legionella pneumonia due to   organisms other than L. pneumophila serogroup 1.   Urinalysis Basic - ( 31 Oct 2020 20:28 )    Color: Light Yellow / Appearance: Clear / S.012 / pH: x  Gluc: x / Ketone: Negative  / Bili: Negative / Urobili: Negative   Blood: x / Protein: Negative / Nitrite: Negative   Leuk Esterase: Negative / RBC: x / WBC x   Sq Epi: x / Non Sq Epi: x / Bacteria: x    Culture - Urine (20 @ 02:06)   Specimen Source: .Urine Clean Catch (Midstream)   Culture Results:   <10,000 CFU/mL Normal Urogenital Mimi   ---------------------------------------------------------------------------------------------------------------  Culture - Blood (10.31.20 @ 22:12)   Specimen Source: .Blood Blood-Peripheral   Culture Results:   No growth to date.   ---------------------------------------------------------------------------------------------------------------  Culture - Blood (10.31.20 @ 22:12)   Specimen Source: .Blood Blood-Peripheral   Culture Results:   No growth to date.   ---------------------------------------------------------------------------------------------------------------    RADIOLOGY:  [x] Chest radiographs reviewed and interpreted by me    < from: Xray Chest 1 View- PORTABLE-Routine (Xray Chest 1 View- PORTABLE-Routine in AM.) (20 @ 09:19) >    EXAM:  XR CHEST PORTABLE ROUTINE 1V                          PROCEDURE DATE:  2020      INTERPRETATION:  XR CHEST. One view.    INDICATION: Hypoxia    COMPARISON: 2020    FINDINGS/  IMPRESSION:    The left pacer is unchanged..    Right pleural effusion and right basilar opacities are again noted.    ANNA ROSA MD; Attending Radiologist  This document has been electronically signed. 2020  9:57AM    < end of copied text >  ---------------------------------------------------------------------------------------------------------------  < from: Xray Chest 1 View- PORTABLE-Urgent (10.31.20 @ 19:56) >    EXAM:  XR CHEST PORTABLE URGENT 1V                          PROCEDURE DATE:  10/31/2020      INTERPRETATION:  CLINICAL INFORMATION: Sepsis.    TECHNIQUE: Frontal radiograph of the chest.    COMPARISON: Chest x-ray dated 2013.    FINDINGS:    LUNGS and pleura: Elevation of the right hemidiaphragm that is new when compared with prior. Small right-sided pleural effusion with adjacent atelectasis. No pneumothorax.    HEART AND MEDIASTINUM: Cardiomediastinal silhouette within normal limits. There is a left-sided dual-lead pacemaker.    SKELETON: Unremarkable skeletal structures.      IMPRESSION:    Elevation of the right hemidiaphragm.  Small right-sided pleural effusion with adjacent atelectasis.      KASANDRA NEWBY MD; Resident Radiology  This document has been electronically signed.  ALTAF ESPINOZA MD; Attending Radiologist  This document has been electronically signed. 2020  8:25AM    < end of copied text >  ---------------------------------------------------------------------------------------------------------------    < from: CT Chest No Cont (20 @ 13:24) >    EXAM:  CT CHEST                          PROCEDURE DATE:  2020      INTERPRETATION:  CLINICAL INDICATION: Shortness of breath, CHF, coronary artery disease.    Axial CT images of the chest are obtained without intravenous administration of contrast.    No prior chest CTs are available for comparison.    Left cardiac device with the leads terminating within the right atrium and right ventricle.    No enlarged axillary, mediastinal or hilar lymph nodes. Multiple subcentimeter nonspecific mediastinal lymph nodes. Cardiomegaly. No pericardial effusion. Diffuse atherosclerotic disease with involvement of the aorta and the coronary arteries. Aortic valve calcifications.    Evaluation of the upper abdomen demonstrate nodularity of the hepatic surface may represent cirrhosis.    Small bilateral pleural effusions, right greater than left containing areas of loculation on the right.    Evaluation of the lung parenchyma demonstrate bilateral interlobular septal thickening, right greater than left suggestive of pulmonary edema. Right lower lobe partial compressive atelectasis. 2 mm bilateral calcified granulomas.    Left lower lobe peripheral curvilinear opacity, part of which measures about 2.1 x 1.2 cm on image 392 of series 4 with some associated calcification. Some surrounding left lower lobe parenchymal lucency is noted which can be seen in the setting of pulmonary air entrapment.    No central endobronchial lesions. Secretions within the trachea.    Degenerative changes of the spine and the shoulders.    IMPRESSION: Small bilateral pleural effusions, right greater than left with interlobular septal thickening suggestive of pulmonary edema. Cardiomegaly with coronary artery atherosclerotic disease.    Right lower lobe partial compressive atelectasis. Please note evaluation of the atelectatic portion of the right lower lobe is limited due to lack of intravenous contrast.    Left lower lobe peripheral 2.1 x 1.2 cm curvilinear nodule is of unclear etiology may be due to impacted dilated airway given surrounding lung parenchymal lucency.    3 month follow-up chest CT is recommended for further evaluation of the above findings.    TRISTAN REA MD; Attending Radiologist  This document has been electronically signed. 2020  1:46PM    < end of copied text >  ---------------------------------------------------------------------------------------------------------------  < from: [facility] Duplex Lower Ext Vein Scan, Bilat (20 @ 11:59) >    EXAM:  DUPLEX SCAN EXT VEINS LOWER BI                          PROCEDURE DATE:  2020      INTERPRETATION:  CLINICAL INFORMATION: Bilateral lower extremity swelling, rule out DVT.    COMPARISON: None available.    TECHNIQUE: Duplex sonography of the BILATERAL LOWER extremity veins with color and spectral Doppler, with and without compression.    FINDINGS:    There is normal compressibility of the bilateral common femoral, femoral and popliteal veins.  Doppler examination shows normal spontaneous and phasic flow.    No calf vein thrombosis is detected.    IMPRESSION:  No evidence of deep venous thrombosis in either lower extremity.    GUDELIA MEEKS M.D., ATTENDING RADIOLOGIST  This document has been electronically signed. 2020 12:13PM    < end of copied text >  ---------------------------------------------------------------------------------------------------------------

## 2020-11-05 NOTE — PROGRESS NOTE ADULT - ASSESSMENT
89 year male, h/o HTN hyperlipidemia hypothyroidism, h/o CAD , sick sinus syndrome,  PAF post [facility] PPM, now admitted with progressive pedal edema, orthopnea,  AFIB persistent x 5 months,    hypoxic with persistent right sided effusion , diastolic heart failure.    He has been diuresed, creatinine increasing, sodium decreasing. furosemide on hold.  NOw post thoracentesis    eliquis on hold    awaiting stress test

## 2020-11-05 NOTE — PROGRESS NOTE ADULT - ASSESSMENT
ASSESSMENT:    dyspnea/hypoxemia due to exacerbation of chronic CHF - ECHO with hyperdynamic LV, mild-moderate pulmonary hypertension and no significant valvular heart disease - RJ with diuresis improving off diuretics    abnormal chest CT  1) small bilateral pleural effusions right > left with areas of loculation on the right and associated atelectasis  2) bilateral interlobular septal thickening suggestive of pulmonary edema  3) left lower lobe peripheral opacity with some associated calcification suggesting chronicity - lucent area of surrounding lung parenchyma suggestive of air trapping -> suspect mucous plugging rather than neoplasm  4) while it is impossible to definitively "rule out" pneumonia within the atelectatic lung, I believe that the patient is without infection in the absence of leukocytosis, fever, an elevated procalcitonin level or positive cultures    persistent atrial fibrillation    PLAN/RECOMMENDATIONS:    oxygen supplementation to keep saturation greater than 92%  observe off antibiotics  holding Eliquis in anticipation of a thoracentesis/thoracenteses  observe off pulmonary medications  follow-up CT scan in 3 months to reevaluate the pulmonary "nodule"  cardiology follow-up - pacemaker adjusted - considering amiodarone and cardioversion  hold diuretics  cardiac meds: ASA/eliquis/lipitor/zocor/imdur/toprol XL  await stress test      Will follow with you. Plan of care discussed with the patient at bedside and Dr. Padgett and the dedicated floor NP    Aneesh Salter MD, [facility]  [phone]  Pulmonary Medicine

## 2020-11-05 NOTE — PROGRESS NOTE ADULT - SUBJECTIVE AND OBJECTIVE BOX
INTERVAL HPI/OVERNIGHT EVENTS: patient feels well, lying flat. leg edema has subsided. s/p R thoracentesis 1.4 L removed. Amlodipine d/emory, metoprolol and imdur increased     diuretics on hold as increased creatinine, hyponatremic    MEDICATIONS  (STANDING):  aspirin enteric coated 81 milliGRAM(s) Oral daily  atorvastatin 20 milliGRAM(s) Oral at bedtime  finasteride 5 milliGRAM(s) Oral daily  heparin   Injectable 5000 Unit(s) SubCutaneous every 12 hours  isosorbide   mononitrate ER Tablet (IMDUR) 60 milliGRAM(s) Oral daily  levothyroxine 50 MICROGram(s) Oral daily  metoprolol succinate  milliGRAM(s) Oral daily  simvastatin 10 milliGRAM(s) Oral at bedtime    MEDICATIONS  (PRN):  acetaminophen   Tablet .. 650 milliGRAM(s) Oral every 8 hours PRN Mild Pain (1 - 3)      Allergies    No Known Allergies    Intolerances      ROS:  General: Pt denies recent weight loss/fever/chills    Neurological: denies numbness or  sensation loss    Cardiovascular: denies chest pain/palpitations/leg edema    Respiratory and Thorax: denies SOB/cough/wheezing    Gastrointestinal: denies abdominal pain/diarrhea/constipation/bloody stool    Genitourinary: denies urinary frequency/urgency/ dysuria    Musculoskeletal: denies joint pain or swelling, denies restricted motion    Hematologic: denies abnormal bleeding  	    	  	    		        	    	            Vital Signs Last 24 Hrs  T(C): 36.6 (2020 16:29), Max: 36.9 (2020 05:32)  T(F): 97.9 (2020 16:29), Max: 98.4 (2020 05:32)  HR: 60 (2020 16:29) (60 - 66)  BP: 107/61 (2020 16:29) (107/61 - 144/66)  BP(mean): --  RR: 18 (2020 16:29) (18 - 18)  SpO2: 96% (2020 16:29) (92% - 96%)  Daily     Daily Weight in k.2 (2020 05:32)     @ 07:01  -   @ 07:00  --------------------------------------------------------  IN: 1040 mL / OUT: 500 mL / NET: 540 mL     @ 07:01  -   @ 20:58  --------------------------------------------------------  IN: 720 mL / OUT: 675 mL / NET: 45 mL      Physical Exam:    wdwn male  no JVD  cor RRR  lung clear  abd soft  ext no edema      LABS:                        11.1   9.22  )-----------( 348      ( 2020 07:26 )             34.6         130<L>  |  93<L>  |  33<H>  ----------------------------<  117<H>  4.8   |  24  |  1.79<H>    Ca    9.3      2020 07:25            RADIOLOGY & ADDITIONAL TESTS:    TELE:    EKG:

## 2020-11-05 NOTE — PROGRESS NOTE ADULT - ASSESSMENT
89 yo male with PMH of HTN, HLD, Bradycardia s/p PPM, on aspirin and eliquis, hypothyroidism, BPH, presents here from assisted living for hypoxia.     Acute respiratory failure with hypoxia.  - CHF exacerbation  - discontinue ceftriaxone, azithromycin  - echocardiogram pending   - c/w IV diuretics.   - Pulmonary evaluation noted  - thoracentesis for pleural effusion pending     CHF exacerbation.   - Patient with right pleural effusion, JVD, b/l LE edema, elevated BNP  - Patient has elevated troponin, possibly 2/2 to CHF vs. RJ  - continue IV Lasix 40mg q12 for now  - echocardiogram- Cardiology evaluation noted    Hypothyroidism (acquired).  - c/w synthroid. Increase Synthroid dose to 50 mcg.     Hypertension.  - c/w isosorbide mononitrate, metoprolol, Norvasc Lasix     Hyperlipidemia.   - c/w atorvastatin.    BPH (benign prostatic hyperplasia).   - c/w finasteride.     Leg edema.  - b/l LE edema, likely 2/2 to heart failure  - VA duplex no DVT     H/O cardiac arrhythmia.    - ? hx of arrhythmia s/p PPM  - pacemaker interrogation  - hold Eliquis for thoracentesis   - aspirin.     Prophylactic measure.  - Heparin while off Eliquis    PT    Kofi Sanchez MD pager [phone]

## 2020-11-05 NOTE — PROGRESS NOTE ADULT - PROBLEM SELECTOR PLAN 6
PPM interrogation- sustained  AFIB , otherwise well functioning device set VVI 60 -110. BPM     with LV hyperdynamic, will decrease rest rate to 60 BPM.

## 2020-11-06 ENCOUNTER — TRANSCRIPTION ENCOUNTER (OUTPATIENT)
Age: 85
End: 2020-11-06

## 2020-11-06 LAB
ANION GAP SERPL CALC-SCNC: 10 MMOL/L — SIGNIFICANT CHANGE UP (ref 5–17)
BUN SERPL-MCNC: 32 MG/DL — HIGH (ref 7–23)
CALCIUM SERPL-MCNC: 8.9 MG/DL — SIGNIFICANT CHANGE UP (ref 8.4–10.5)
CHLORIDE SERPL-SCNC: 95 MMOL/L — LOW (ref 96–108)
CO2 SERPL-SCNC: 25 MMOL/L — SIGNIFICANT CHANGE UP (ref 22–31)
CREAT SERPL-MCNC: 1.74 MG/DL — HIGH (ref 0.5–1.3)
GLUCOSE SERPL-MCNC: 102 MG/DL — HIGH (ref 70–99)
HCT VFR BLD CALC: 34.8 % — LOW (ref 39–50)
HGB BLD-MCNC: 11 G/DL — LOW (ref 13–17)
MCHC RBC-ENTMCNC: 30.3 PG — SIGNIFICANT CHANGE UP (ref 27–34)
MCHC RBC-ENTMCNC: 31.6 GM/DL — LOW (ref 32–36)
MCV RBC AUTO: 95.9 FL — SIGNIFICANT CHANGE UP (ref 80–100)
NRBC # BLD: 0 /100 WBCS — SIGNIFICANT CHANGE UP (ref 0–0)
PLATELET # BLD AUTO: 341 K/UL — SIGNIFICANT CHANGE UP (ref 150–400)
POTASSIUM SERPL-MCNC: 4.6 MMOL/L — SIGNIFICANT CHANGE UP (ref 3.5–5.3)
POTASSIUM SERPL-SCNC: 4.6 MMOL/L — SIGNIFICANT CHANGE UP (ref 3.5–5.3)
RBC # BLD: 3.63 M/UL — LOW (ref 4.2–5.8)
RBC # FLD: 16.2 % — HIGH (ref 10.3–14.5)
SODIUM SERPL-SCNC: 130 MMOL/L — LOW (ref 135–145)
WBC # BLD: 10.72 K/UL — HIGH (ref 3.8–10.5)
WBC # FLD AUTO: 10.72 K/UL — HIGH (ref 3.8–10.5)

## 2020-11-06 PROCEDURE — 93016 CV STRESS TEST SUPVJ ONLY: CPT

## 2020-11-06 PROCEDURE — 78452 HT MUSCLE IMAGE SPECT MULT: CPT | Mod: 26

## 2020-11-06 PROCEDURE — 93018 CV STRESS TEST I&R ONLY: CPT

## 2020-11-06 RX ORDER — APIXABAN 2.5 MG/1
2.5 TABLET, FILM COATED ORAL EVERY 12 HOURS
Refills: 0 | Status: DISCONTINUED | OUTPATIENT
Start: 2020-11-06 | End: 2020-11-09

## 2020-11-06 RX ADMIN — HEPARIN SODIUM 5000 UNIT(S): 5000 INJECTION INTRAVENOUS; SUBCUTANEOUS at 05:25

## 2020-11-06 RX ADMIN — Medication 50 MICROGRAM(S): at 05:25

## 2020-11-06 RX ADMIN — APIXABAN 2.5 MILLIGRAM(S): 2.5 TABLET, FILM COATED ORAL at 19:14

## 2020-11-06 RX ADMIN — Medication 100 MILLIGRAM(S): at 05:25

## 2020-11-06 RX ADMIN — ATORVASTATIN CALCIUM 20 MILLIGRAM(S): 80 TABLET, FILM COATED ORAL at 21:10

## 2020-11-06 RX ADMIN — ISOSORBIDE MONONITRATE 60 MILLIGRAM(S): 60 TABLET, EXTENDED RELEASE ORAL at 17:50

## 2020-11-06 RX ADMIN — Medication 81 MILLIGRAM(S): at 17:50

## 2020-11-06 RX ADMIN — FINASTERIDE 5 MILLIGRAM(S): 5 TABLET, FILM COATED ORAL at 17:51

## 2020-11-06 NOTE — DISCHARGE NOTE PROVIDER - HOSPITAL COURSE
89 year male, h/o HTN hyperlipidemia hypothyroidism, h/o CAD , sick sinus syndrome,  PAF post [facility] PPM, now admitted with progressive pedal edema, orthopnea,  AFIB persistent x 5 months,  hypoxic with persistent right sided effusion , diastolic heart failure.  seen by cardiology/pulmonary  He has been diuresed, creatinine increasing, sodium decreasing. furosemide on hold.  11/5-right  thoracentesis and removed 1400ml of fluid 89 year male, h/o HTN hyperlipidemia hypothyroidism, h/o CAD , sick sinus syndrome,  PAF post [facility] PPM, now admitted with progressive pedal edema, orthopnea,  AFIB persistent x 5 months,  hypoxic with persistent right sided effusion , diastolic heart failure.    Followed by Pulmonary and EP Cardiology.  Ct chest showed B/L pleural effusions R>L, pulmonary edema, and pulmonary nodule. S/p right thoracentesis by pulmonary on 11/5; CT chest to be repeated in 3 months to f/u nodule. Pt diuresed; found to have persistent Afib with loss of atrial contraction. Cardiology discussed with son option of amiodarone and elective cardioversion, patient's son would prefer to manage without cardioversion if possible. PPM rate adjusted to 60BPM.  Medically cleared for dc to home today with home PT by Dr keith     89 year male, h/o HTN hyperlipidemia hypothyroidism, h/o CAD , sick sinus syndrome,  PAF post [facility] PPM, now admitted with progressive pedal edema, orthopnea,  AFIB persistent x 5 months,  hypoxic with persistent right sided effusion , diastolic heart failure.    Followed by Pulmonary and EP Cardiology.  Ct chest showed B/L pleural effusions R>L, pulmonary edema, and pulmonary nodule. S/p right thoracentesis by pulmonary on 11/5; CT chest to be repeated in 3 months to f/u nodule. Pt diuresed; found to have persistent Afib with loss of atrial contraction. Cardiology discussed with son option of amiodarone and elective cardioversion, patient's son would prefer to manage without cardioversion if possible. PPM rate adjusted to 60BPM.  Medically cleared for dc to home today with home PT by Dr keith.

## 2020-11-06 NOTE — PROGRESS NOTE ADULT - ASSESSMENT
89 year male, h/o HTN hyperlipidemia hypothyroidism, h/o CAD , sick sinus syndrome,  PAF post [facility] PPM, now admitted with progressive pedal edema, orthopnea,  AFIB persistent x 5 months,  hypoxic with persistent right sided effusion , diastolic heart failure.    He has been diuresed, creatinine increasing, sodium decreasing. furosemide on hold.  NOw post thoracentesis    Eliquis restarted    stress test results pending

## 2020-11-06 NOTE — PROGRESS NOTE ADULT - ASSESSMENT
ASSESSMENT:    dyspnea/hypoxemia due to exacerbation of chronic CHF - ECHO with hyperdynamic LV, mild-moderate pulmonary hypertension and no significant valvular heart disease - RJ with diuresis improving off diuretics    abnormal chest CT  1) small bilateral pleural effusions right > left with areas of loculation on the right and associated atelectasis - s/p large volume right thoracentesis - 1400cc - yellow fluid  2) bilateral interlobular septal thickening suggestive of pulmonary edema  3) left lower lobe peripheral opacity with some associated calcification suggesting chronicity - lucent area of surrounding lung parenchyma suggestive of air trapping -> suspect mucous plugging rather than neoplasm  4) while it is impossible to definitively "rule out" pneumonia within the atelectatic lung, I believe that the patient is without infection in the absence of leukocytosis, fever, an elevated procalcitonin level or positive cultures    persistent atrial fibrillation    PLAN/RECOMMENDATIONS:    oxygen supplementation to keep saturation greater than 92% - trial off supplemental oxygen  observe off antibiotics  restart Eliquis  observe off pulmonary medications  follow-up CT scan in 3 months to reevaluate the pulmonary "nodule"  cardiology follow-up - pacemaker adjusted - considering amiodarone and cardioversion  hold diuretics  cardiac meds: ASA/eliquis/lipitor/imdur/toprol XL  await stress test      Will follow with you. Plan of care discussed with the patient at bedside.    Aneesh Salter MD, [facility]  [phone]  Pulmonary Medicine     ASSESSMENT:    dyspnea/hypoxemia due to exacerbation of chronic CHF - ECHO with hyperdynamic LV, mild-moderate pulmonary hypertension and no significant valvular heart disease - RJ with diuresis improving off diuretics    abnormal chest CT  1) small bilateral pleural effusions right > left with areas of loculation on the right and associated atelectasis - s/p large volume right thoracentesis - 1400cc - yellow fluid  2) bilateral interlobular septal thickening suggestive of pulmonary edema  3) left lower lobe peripheral opacity with some associated calcification suggesting chronicity - lucent area of surrounding lung parenchyma suggestive of air trapping -> suspect mucous plugging rather than neoplasm  4) while it is impossible to definitively "rule out" pneumonia within the atelectatic lung, I believe that the patient is without infection in the absence of leukocytosis, fever, an elevated procalcitonin level or positive cultures    persistent atrial fibrillation    PLAN/RECOMMENDATIONS:    oxygen supplementation to keep saturation greater than 92% - trial off supplemental oxygen  observe off antibiotics  restart Eliquis  observe off pulmonary medications  follow-up CT scan in 3 months to reevaluate the pulmonary "nodule"  cardiology follow-up - pacemaker adjusted - considering amiodarone and cardioversion  hold diuretics  cardiac meds: ASA/eliquis/lipitor/imdur/toprol XL  await stress test      Will follow with you. Plan of care discussed with the patient at bedside.    Please call over the weekend with questions or clinical changes. Dr. Hudson Araya is on call for our practice.    Aneesh Salter MD, [facility]  [phone]  Pulmonary Medicine

## 2020-11-06 NOTE — DISCHARGE NOTE PROVIDER - NSDCCPCAREPLAN_GEN_ALL_CORE_FT
PRINCIPAL DISCHARGE DIAGNOSIS  Diagnosis: Hypoxia  Assessment and Plan of Treatment: likely secondary to pleural effusion  right thoracentesis 11/5/20 and removed 1400ml fluid  follow-up CT scan in 3 months to reevaluate the pulmonary "nodule"      SECONDARY DISCHARGE DIAGNOSES  Diagnosis: Congestive heart failure, unspecified HF chronicity, unspecified heart failure type  Assessment and Plan of Treatment: continue heart medication  follow up with cardiology  stress test done 11/6/20       PRINCIPAL DISCHARGE DIAGNOSIS  Diagnosis: Hypoxia  Assessment and Plan of Treatment: Condition now resolved.  You were found to have right side pleural effusion; fluid removed (Thoracentesis) on 11/5 by Pulmonary.  Hali will need a follow-up CT scan in 3 months as a pulmonary nodule was seen on CT scan chest.  Follow up with Pulmonary in 1-2 weeks.      SECONDARY DISCHARGE DIAGNOSES  Diagnosis: Acute on chronic congestive heart failure, unspecified heart failure type  Assessment and Plan of Treatment: Acute on chronic congestive heart failure, unspecified heart failure type    Diagnosis: Leg edema  Assessment and Plan of Treatment: Leg edema    Diagnosis: Acute kidney injury  Assessment and Plan of Treatment: Acute kidney injury    Diagnosis: Mixed hyperlipidemia  Assessment and Plan of Treatment: Mixed hyperlipidemia    Diagnosis: Hypertension  Assessment and Plan of Treatment: Hypertension    Diagnosis: Sick sinus syndrome  Assessment and Plan of Treatment: Sick sinus syndrome     PRINCIPAL DISCHARGE DIAGNOSIS  Diagnosis: Acute on chronic congestive heart failure, unspecified heart failure type  Assessment and Plan of Treatment: Weigh yourself daily.  If you gain 3lbs in 3 days, or 5lbs in a week call your Health Care Provider.  Do not eat or drink foods containing more than 2000mg of salt (sodium) in your diet every day.  Call your Health Care Provider if you have any swelling or increased swelling in your feet, ankles, and/or stomach.  Take all of your medication as directed.  If you become dizzy call your Health Care Provider.      SECONDARY DISCHARGE DIAGNOSES  Diagnosis: Hypoxia  Assessment and Plan of Treatment: Condition now resolved.  You were found to have right side pleural effusionon CT scan chest; fluid removed (Thoracentesis) on 11/5 by Pulmonary.  Hali will need a follow-up CT scan in 3 months as a pulmonary nodule was also seen on CT.  Follow up with Pulmonary in 1-2 weeks. Call for appointment.    Diagnosis: Leg edema  Assessment and Plan of Treatment: Leg edema    Diagnosis: Acute kidney injury  Assessment and Plan of Treatment: Acute kidney injury    Diagnosis: Mixed hyperlipidemia  Assessment and Plan of Treatment: Mixed hyperlipidemia    Diagnosis: Hypertension  Assessment and Plan of Treatment: Hypertension    Diagnosis: Sick sinus syndrome  Assessment and Plan of Treatment: Sick sinus syndrome    Diagnosis: Acute on chronic congestive heart failure, unspecified heart failure type  Assessment and Plan of Treatment: Acute on chronic congestive heart failure, unspecified heart failure type     PRINCIPAL DISCHARGE DIAGNOSIS  Diagnosis: Acute on chronic congestive heart failure, unspecified heart failure type  Assessment and Plan of Treatment: Weigh yourself daily.  If you gain 3lbs in 3 days, or 5lbs in a week call your Health Care Provider.  Do not eat or drink foods containing more than 2000mg of salt (sodium) in your diet every day.  Call your Health Care Provider if you have any swelling or increased swelling in your feet, ankles, and/or stomach.  Take all of your medication as directed.  If you become dizzy call your Health Care Provider.      SECONDARY DISCHARGE DIAGNOSES  Diagnosis: Hypoxia  Assessment and Plan of Treatment: Condition now resolved.  You were found to have right side pleural effusionon CT scan chest; fluid removed (Thoracentesis) on 11/5 by Pulmonary.  Hali will need a follow-up CT scan in 3 months as a pulmonary nodule was also seen on CT.  Follow up with Pulmonary in 1-2 weeks. Call for appointment.    Diagnosis: Leg edema  Assessment and Plan of Treatment: Improved.  Continue with water pills (diuretics) as prescribed.    Diagnosis: Acute kidney injury  Assessment and Plan of Treatment: Condition improved.  Drink at least 500ML water daily.    Diagnosis: Mixed hyperlipidemia  Assessment and Plan of Treatment: Continue with medication as prescribed.    Diagnosis: Hypertension  Assessment and Plan of Treatment: Low salt diet  Activity as tolerated.  Take all medication as prescribed.  Follow up with your medical doctor for routine blood pressure monitoring at your next visit.  Notify your doctor if you have any of the following symptoms:   Dizziness, Lightheadedness, Blurry vision, Headache, Chest pain, Shortness of breath    Diagnosis: Sick sinus syndrome  Assessment and Plan of Treatment: You have a pacemaker in-situ  Follow up with you Cardiologist.    Diagnosis: Acute on chronic congestive heart failure, unspecified heart failure type  Assessment and Plan of Treatment: Acute on chronic congestive heart failure, unspecified heart failure type

## 2020-11-06 NOTE — DIETITIAN INITIAL EVALUATION ADULT. - ADD RECOMMEND
continue DASH diet, conduct interview, monitor need education. pt with RJ monitor renal indices and need for renal restrictions.

## 2020-11-06 NOTE — DIETITIAN INITIAL EVALUATION ADULT. - PERTINENT MEDS FT
MEDICATIONS  (STANDING):  apixaban 2.5 milliGRAM(s) Oral every 12 hours  aspirin enteric coated 81 milliGRAM(s) Oral daily  atorvastatin 20 milliGRAM(s) Oral at bedtime  finasteride 5 milliGRAM(s) Oral daily  isosorbide   mononitrate ER Tablet (IMDUR) 60 milliGRAM(s) Oral daily  levothyroxine 50 MICROGram(s) Oral daily  metoprolol succinate  milliGRAM(s) Oral daily    MEDICATIONS  (PRN):  acetaminophen   Tablet .. 650 milliGRAM(s) Oral every 8 hours PRN Mild Pain (1 - 3)

## 2020-11-06 NOTE — PROGRESS NOTE ADULT - SUBJECTIVE AND OBJECTIVE BOX
[facility] - PROGRESS NOTE    CHIEF COMPLAINT: hypoxemia; pleural effusions; atelectasis; lung nodule    INTERVAL HISTORY: awaiting stress test; s/p large volume thoracentesis  - 1400cc - clear yellow fluid - no labs sent; no shortness of breath on a nasal canula;  no cough, sputum production, chest congestion or wheeze; no fevers, chills or sweats; no chest pain/pressure or palpitations; improved renal function off diuretics; resolved lower extremity swelling right > left;    REVIEW OF SYSTEMS:  Constitutional: As per interval history  HEENT: Within normal limits  CV: As per interval history  Resp: As per interval history  GI: Within normal limits   : Within normal limits  Musculoskeletal: Within normal limits  Skin: Within normal limits  Neurological: Within normal limits  Psychiatric: Within normal limits  Endocrine: Within normal limits  Hematologic/Lymphatic: Within normal limits  Allergic/Immunologic: Within normal limits    MEDICATIONS:     Pulmonary "    Anti-microbials:    Cardiovascular:  isosorbide   mononitrate ER Tablet (IMDUR) 60 milliGRAM(s) Oral daily  metoprolol succinate  milliGRAM(s) Oral daily    Other:  aspirin enteric coated 81 milliGRAM(s) Oral daily  atorvastatin 20 milliGRAM(s) Oral at bedtime  finasteride 5 milliGRAM(s) Oral daily  heparin   Injectable 5000 Unit(s) SubCutaneous every 12 hours  levothyroxine 50 MICROGram(s) Oral daily    MEDICATIONS  (PRN):  acetaminophen   Tablet .. 650 milliGRAM(s) Oral every 8 hours PRN Mild Pain (1 - 3)        OBJECTIVE:    I&O's Detail    2020 07:01  -  2020 07:00  --------------------------------------------------------  IN:    Oral Fluid: 720 mL  Total IN: 720 mL    OUT:    Voided (mL): 775 mL  Total OUT: 775 mL    Total NET: -55 mL    Daily Weight in k.3 (2020 05:23)    PHYSICAL EXAM:       ICU Vital Signs Last 24 Hrs  T(C): 36.7 (2020 08:00), Max: 36.7 (2020 05:23)  T(F): 98 (2020 08:00), Max: 98 (2020 05:23)  HR: 60 (2020 08:00) (60 - 62)  BP: 138/69 (2020 08:00) (107/61 - 138/69)  BP(mean): --  ABP: --  ABP(mean): --  RR: 16 (2020 08:00) (16 - 18)  SpO2: 97% (2020 08:00) (93% - 97%) on 2lpm nasal canula     General: Awake. Alert. Cooperative. No distress. Appears stated age.	  HEENT: Atraumatic. Normocephalic. Anicteric. Normal oral mucosa. PERRL. EOMI.  Neck: Supple. Trachea midline. Thyroid without enlargement/tenderness/nodules. No carotid bruit. No JVD.	  Cardiovascular: Irregularly irregular rate and rhythm. S1 S2 normal. No murmurs, rubs or gallops.  Respiratory: Respirations unlabored. Improved breath sounds right hemithorax. No curvature.  Abdomen: Soft. Non-tender. Non-distended. No organomegaly. No masses. Normal bowel sounds.  Extremities: Warm to touch. No clubbing or cyanosis. Resolved pretibial edema right > left. Lower extremity venous stasis changes  Pulses: 2+ peripheral pulses all extremities.	  Skin: Normal skin color. No rashes or lesions. No ecchymoses. No cyanosis. Warm to touch.  Lymph Nodes: Cervical, supraclavicular and axillary nodes normal  Neurological: Motor and sensory examination equal and normal. A and O x 3  Psychiatry: Appropriate mood and affect.    LABS:                          11.0   10.72 )-----------( 341      ( 2020 05:33 )             34.8     CBC    WBC  10.72 <==, 9.22 <==, 8.88 <==, 8.82 <==, 9.76 <==, 8.25 <==    Hemoglobin  11.0 <<==, 11.1 <<==, 11.0 <<==, 11.5 <<==, 11.1 <<==, 10.8 <<==    Hematocrit  34.8 <==, 34.6 <==, 34.6 <==, 35.7 <==, 35.3 <==, 33.9 <==    Platelets  341 <==, 348 <==, 362 <==, 368 <==, 363 <==, 354 <==      130<L>  |  95<L>  |  32<H>  ----------------------------<  102<H>    11-06  4.6   |  25  |  1.74<H>      LYTES    sodium  130 <==, 130 <==, 134 <==, 133 <==, 135 <==, 135 <==    potassium   4.6 <==, 4.8 <==, 4.8 <==, 3.3 <==, 4.0 <==, 4.4 <==    chloride  95 <==, 93 <==, 95 <==, 92 <==, 97 <==, 99 <==    carbon dioxide  25 <==, 24 <==, 28 <==, 27 <==, 24 <==, 25 <==    =============================================================================================  RENAL FUNCTION:    Creatinine:   1.74  <<==, 1.79  <<==, 2.03  <<==, 1.55  <<==, 1.49  <<==, 1.74  <<==    BUN:   32 <==, 33 <==, 30 <==, 25 <==, 27 <==, 31 <==    ============================================================================================    calcium   8.9 <==, 9.3 <==, 9.2 <==, 9.1 <==, 9.2 <==, 8.9 <==    phos   3.3 <==    mag   2.2 <==    ============================================================================================  LFTs    AST:   34 <== , 31 <==     ALT:  21  <== , 18  <==     AP:  93  <=, 89  <=    Bili:  1.0  <=, 0.8  <=    PT/INR - ( 31 Oct 2020 19:56 )   PT: 15.0 sec;   INR: 1.26 ratio      Procalcitonin, Serum: 0.15 ng/mL ( @ 07:06)    Serum Pro-Brain Natriuretic Peptide: 3619 pg/mL ( @ 07:18)  Serum Pro-Brain Natriuretic Peptide: 2696 pg/mL (10-31 @ 19:56)    CARDIAC MARKERS ( 2020 07:06 )  CPK x     /CKMB x     /CKMB Units x        troponin 71 ng/L    CARDIAC MARKERS ( 2020 00:27 )  CPK x     /CKMB x     /CKMB Units x        troponin 70 ng/L    CARDIAC MARKERS ( 31 Oct 2020 19:56 )  CPK x     /CKMB x     /CKMB Units x        troponin 72 ng/L    < from: Transthoracic Echocardiogram (20 @ 08:20) >    Patient name: CLEMENTE BULLOCK  YOB: 1925   Age: 89 (M)   MR#: [id number]  Study Date: 11/3/2020  [id number] RD[facility]  Study quality: Technically fair  Referring Physician: Kofi Sanchez MD  Blood Pressure: 156/67 mmHg  Height: 168 cm  Weight: 64 kg  BSA: 1.7 m2  Heart Rhythm: Atrial flutter  ------------------------------------------------------------------------  PROCEDURE: Transthoracic echocardiogram with 2-D, M-Mode  and complete spectral and color flow Doppler.  INDICATION: Dyspnea, unspecified (R06.00)  ------------------------------------------------------------------------  Dimensions:    Normal Values:  LA:     4.5    2.0 - 4.0 cm  Ao:     2.7    2.0 - 3.8 cm  SEPTUM: 1.3    0.6 - 1.2 cm  PWT:    1.3    0.6 - 1.1 cm  LVIDd:  4.3    3.0 - 5.6 cm  LVIDs:  2.5    1.8 - 4.0 cm  Derived variables:  LVMI: 121 g/m2  RWT: 0.60  Fractional short: 42 %  EF (Visual Estimate): 75 %  Doppler Peak Velocity (m/sec): AoV=1.7 TV=3.2  ------------------------------------------------------------------------  Observations:  Mitral Valve: Normal mitral valve. Mitral annular  calcification. Mild mitral regurgitation.  Aortic Valve/Aorta: Calcified aortic valve with normal  opening.  Normal aortic root size.  Left Atrium: Moderate left atrial enlargement.  Left Ventricle: Normal left ventricular internal  dimensions. Mild-moderate concentric hypertrophy.  Hyperdynamic left ventricle.  Right Heart: Mild right atrial enlargement. Normal right  ventricular size and function.  A device wire is noted in the right heart.  Normal tricuspid valve. Mild tricuspid regurgitation.  Normal pulmonic valve. Mild pulmonic regurgitation.  Pericardium/Pleura: Normal pericardium with no pericardial  effusion.  Hemodynamic: Estimated right atrial pressure is normal.  Mild-moderate pulmonary hypertension. Estimated PASP 45  mmHg.  ------------------------------------------------------------------------  Conclusions:  Hyperdynamic left ventricle.  Mild-moderate pulmonary hypertension.  A device wire is noted in the right heart.  ------------------------------------------------------------------------  Confirmed on  11/3/2020 - 10:17:57 by Miguel Dolan MD, FASE  ------------------------------------------------------------------------    < end of copied text >  ---------------------------------------------------------------------------------------------------------------    MICROBIOLOGY:     Legionella pneumophila Antigen, Urine (20 @ 13:40)   Legionella Antigen, Urine: Negative: Negative Testing method: Immunochromatographic Assay. L. pneumpohila   serogroup 1 antigen in urine NOT detected, suggesting NO recent or   current infection. Infection due to Legionella cannot be ruled out: other   serogroups and species may cause disease, antigen may not be present in   urine in early infection, or the level of antigens in urine may be below   the detection limit of the test. Order “Culture –Legionella” is   recommended for uncommon cases of suspected Legionella pneumonia due to   organisms other than L. pneumophila serogroup 1.   Urinalysis Basic - ( 31 Oct 2020 20:28 )    Color: Light Yellow / Appearance: Clear / S.012 / pH: x  Gluc: x / Ketone: Negative  / Bili: Negative / Urobili: Negative   Blood: x / Protein: Negative / Nitrite: Negative   Leuk Esterase: Negative / RBC: x / WBC x   Sq Epi: x / Non Sq Epi: x / Bacteria: x    Culture - Urine (20 @ 02:06)   Specimen Source: .Urine Clean Catch (Midstream)   Culture Results:   <10,000 CFU/mL Normal Urogenital Mimi   ---------------------------------------------------------------------------------------------------------------  Culture - Blood (10.31.20 @ 22:12)   Specimen Source: .Blood Blood-Peripheral   Culture Results:   No growth to date.   ---------------------------------------------------------------------------------------------------------------  Culture - Blood (10.31.20 @ :12)   Specimen Source: .Blood Blood-Peripheral   Culture Results:   No growth to date.   ---------------------------------------------------------------------------------------------------------------    RADIOLOGY:  [x] Chest radiographs reviewed and interpreted by me    < from: Xray Chest 1 View AP/PA (20 @ 14:54) >    EXAM:  XR CHEST AP OR PA 1V                          PROCEDURE DATE:  2020      INTERPRETATION:  Portable chest xray: r/o PTX    Comparison: Most recent prior    FINDINGS:    Lines/Tubes: In expected locations    Heart and mediastinum:  Unchanged in appearance.    Lungs, pleura, and airways: Interval decrease in size of right pleural effusion following thoracentesis. No pneumothorax. Patchy airspace disease in the right mid to lower lung is unchanged    Bones and soft tissues: Thebones and soft tissues are unchanged.    Impression:    No pneumothorax following thoracentesis.    FAVIO KNOTT MD; Attending Radiologist  This document has been electronically signed. 2020  2:51PM    < end of copied text >  ---------------------------------------------------------------------------------------------------------------  < from: Xray Chest 1 View- PORTABLE-Routine (Xray Chest 1 View- PORTABLE-Routine in AM.) (20 @ 09:19) >    EXAM:  XR CHEST PORTABLE ROUTINE 1V                          PROCEDURE DATE:  2020      INTERPRETATION:  XR CHEST. One view.    INDICATION: Hypoxia    COMPARISON: 2020    FINDINGS/  IMPRESSION:    The left pacer is unchanged..    Right pleural effusion and right basilar opacities are again noted.    ANNA ROSA MD; Attending Radiologist  This document has been electronically signed. 2020  9:57AM    < end of copied text >  ---------------------------------------------------------------------------------------------------------------  < from: Xray Chest 1 View- PORTABLE-Urgent (10.31.20 @ 19:56) >    EXAM:  XR CHEST PORTABLE URGENT 1V                          PROCEDURE DATE:  10/31/2020      INTERPRETATION:  CLINICAL INFORMATION: Sepsis.    TECHNIQUE: Frontal radiograph of the chest.    COMPARISON: Chest x-ray dated 2013.    FINDINGS:    LUNGS and pleura: Elevation of the right hemidiaphragm that is new when compared with prior. Small right-sided pleural effusion with adjacent atelectasis. No pneumothorax.    HEART AND MEDIASTINUM: Cardiomediastinal silhouette within normal limits. There is a left-sided dual-lead pacemaker.    SKELETON: Unremarkable skeletal structures.      IMPRESSION:    Elevation of the right hemidiaphragm.  Small right-sided pleural effusion with adjacent atelectasis.      KASANDRA NEWBY MD; Resident Radiology  This document has been electronically signed.  ALTAF ESPINOZA MD; Attending Radiologist  This document has been electronically signed. 2020  8:25AM    < end of copied text >  ---------------------------------------------------------------------------------------------------------------    < from: CT Chest No Cont (20 @ 13:24) >    EXAM:  CT CHEST                          PROCEDURE DATE:  2020      INTERPRETATION:  CLINICAL INDICATION: Shortness of breath, CHF, coronary artery disease.    Axial CT images of the chest are obtained without intravenous administration of contrast.    No prior chest CTs are available for comparison.    Left cardiac device with the leads terminating within the right atrium and right ventricle.    No enlarged axillary, mediastinal or hilar lymph nodes. Multiple subcentimeter nonspecific mediastinal lymph nodes. Cardiomegaly. No pericardial effusion. Diffuse atherosclerotic disease with involvement of the aorta and the coronary arteries. Aortic valve calcifications.    Evaluation of the upper abdomen demonstrate nodularity of the hepatic surface may represent cirrhosis.    Small bilateral pleural effusions, right greater than left containing areas of loculation on the right.    Evaluation of the lung parenchyma demonstrate bilateral interlobular septal thickening, right greater than left suggestive of pulmonary edema. Right lower lobe partial compressive atelectasis. 2 mm bilateral calcified granulomas.    Left lower lobe peripheral curvilinear opacity, part of which measures about 2.1 x 1.2 cm on image 392 of series 4 with some associated calcification. Some surrounding left lower lobe parenchymal lucency is noted which can be seen in the setting of pulmonary air entrapment.    No central endobronchial lesions. Secretions within the trachea.    Degenerative changes of the spine and the shoulders.    IMPRESSION: Small bilateral pleural effusions, right greater than left with interlobular septal thickening suggestive of pulmonary edema. Cardiomegaly with coronary artery atherosclerotic disease.    Right lower lobe partial compressive atelectasis. Please note evaluation of the atelectatic portion of the right lower lobe is limited due to lack of intravenous contrast.    Left lower lobe peripheral 2.1 x 1.2 cm curvilinear nodule is of unclear etiology may be due to impacted dilated airway given surrounding lung parenchymal lucency.    3 month follow-up chest CT is recommended for further evaluation of the above findings.    TRISTAN REA MD; Attending Radiologist  This document has been electronically signed. 2020  1:46PM    < end of copied text >  ---------------------------------------------------------------------------------------------------------------  < from: [facility] Duplex Lower Ext Vein Scan, Bilat (20 @ 11:59) >    EXAM:  DUPLEX SCAN EXT VEINS LOWER BI                          PROCEDURE DATE:  2020      INTERPRETATION:  CLINICAL INFORMATION: Bilateral lower extremity swelling, rule out DVT.    COMPARISON: None available.    TECHNIQUE: Duplex sonography of the BILATERAL LOWER extremity veins with color and spectral Doppler, with and without compression.    FINDINGS:    There is normal compressibility of the bilateral common femoral, femoral and popliteal veins.  Doppler examination shows normal spontaneous and phasic flow.    No calf vein thrombosis is detected.    IMPRESSION:  No evidence of deep venous thrombosis in either lower extremity.    GUDELIA MEEKS M.D., ATTENDING RADIOLOGIST  This document has been electronically signed. 2020 12:13PM    < end of copied text >  ---------------------------------------------------------------------------------------------------------------

## 2020-11-06 NOTE — PROGRESS NOTE ADULT - SUBJECTIVE AND OBJECTIVE BOX
INTERVAL HPI/OVERNIGHT EVENTS: patient  feels comfortable,  SOB    MEDICATIONS  (STANDING):  apixaban 2.5 milliGRAM(s) Oral every 12 hours  aspirin enteric coated 81 milliGRAM(s) Oral daily  atorvastatin 20 milliGRAM(s) Oral at bedtime  finasteride 5 milliGRAM(s) Oral daily  isosorbide   mononitrate ER Tablet (IMDUR) 60 milliGRAM(s) Oral daily  levothyroxine 50 MICROGram(s) Oral daily  metoprolol succinate  milliGRAM(s) Oral daily    MEDICATIONS  (PRN):  acetaminophen   Tablet .. 650 milliGRAM(s) Oral every 8 hours PRN Mild Pain (1 - 3)      Allergies    No Known Allergies    Intolerances      ROS:  General: Pt denies recent weight loss/fever/chills    Neurological: denies numbness or  sensation loss    Cardiovascular: denies chest pain/palpitations/leg edema    Respiratory and Thorax: denies SOB/cough/wheezing    Gastrointestinal: denies abdominal pain/diarrhea/constipation/bloody stool    Genitourinary: denies urinary frequency/urgency/ dysuria    Musculoskeletal: denies joint pain or swelling, denies restricted motion    Hematologic: denies abnormal bleeding  	    	  	    		        	    	            Vital Signs Last 24 Hrs  T(C): 36.6 (2020 12:55), Max: 36.7 (2020 05:23)  T(F): 97.8 (2020 12:55), Max: 98 (2020 05:23)  HR: 65 (2020 12:55) (60 - 65)  BP: 117/68 (2020 12:55) (107/61 - 138/69)  BP(mean): --  RR: 17 (2020 12:55) (16 - 18)  SpO2: 97% (2020 12:55) (96% - 97%)  Daily     Daily Weight in k.3 (2020 05:23)     @ 07:  -   @ 07:00  --------------------------------------------------------  IN: 720 mL / OUT: 775 mL / NET: -55 mL     @ 07: @ 16:12  --------------------------------------------------------  IN: 720 mL / OUT: 0 mL / NET: 720 mL      Physical Exam:    wdwn   no jvd   cor rrr   lung clear      LABS:                        11.0   10.72 )-----------( 341      ( 2020 05:33 )             34.8     11    130<L>  |  95<L>  |  32<H>  ----------------------------<  102<H>  4.6   |  25  |  1.74<H>    Ca    8.9      2020 05:33            RADIOLOGY & ADDITIONAL TESTS:    TELE:    EKG:             INTERVAL HPI/OVERNIGHT EVENTS: patient  feels comfortable,  SOB    MEDICATIONS  (STANDING):  apixaban 2.5 milliGRAM(s) Oral every 12 hours  aspirin enteric coated 81 milliGRAM(s) Oral daily  atorvastatin 20 milliGRAM(s) Oral at bedtime  finasteride 5 milliGRAM(s) Oral daily  isosorbide   mononitrate ER Tablet (IMDUR) 60 milliGRAM(s) Oral daily  levothyroxine 50 MICROGram(s) Oral daily  metoprolol succinate  milliGRAM(s) Oral daily    MEDICATIONS  (PRN):  acetaminophen   Tablet .. 650 milliGRAM(s) Oral every 8 hours PRN Mild Pain (1 - 3)      Allergies    No Known Allergies    Intolerances      ROS:  General: Pt denies recent weight loss/fever/chills    Neurological: denies numbness or  sensation loss    Cardiovascular: denies chest pain/palpitations/leg edema    Respiratory and Thorax: denies SOB/cough/wheezing    Gastrointestinal: denies abdominal pain/diarrhea/constipation/bloody stool    Genitourinary: denies urinary frequency/urgency/ dysuria    Musculoskeletal: denies joint pain or swelling, denies restricted motion    Hematologic: denies abnormal bleeding  	    	  	    		        	    	            Vital Signs Last 24 Hrs  T(C): 36.6 (2020 12:55), Max: 36.7 (2020 05:23)  T(F): 97.8 (2020 12:55), Max: 98 (2020 05:23)  HR: 65 (2020 12:55) (60 - 65)  BP: 117/68 (2020 12:55) (107/61 - 138/69)  BP(mean): --  RR: 17 (2020 12:55) (16 - 18)  SpO2: 97% (2020 12:55) (96% - 97%)  Daily     Daily Weight in k.3 (2020 05:23)     @ 07:  -   @ 07:00  --------------------------------------------------------  IN: 720 mL / OUT: 775 mL / NET: -55 mL     @ 07: @ 16:12  --------------------------------------------------------  IN: 720 mL / OUT: 0 mL / NET: 720 mL      Physical Exam:    wdwn   no jvd   cor rrr   lung clear anteriorly ,decreased breath sound   abd soft   ext no edema        LABS:                        11.0   10.72 )-----------( 341      ( 2020 05:33 )             34.8         130<L>  |  95<L>  |  32<H>  ----------------------------<  102<H>  4.6   |  25  |  1.74<H>    Ca    8.9      2020 05:33            RADIOLOGY & ADDITIONAL TESTS:    TELE:    EKG:

## 2020-11-06 NOTE — DIETITIAN INITIAL EVALUATION ADULT. - ORAL INTAKE PTA/DIET HISTORY
CM reviewed chart and spoke w/ pt's daughter Karrie Green early this am around 0830. PT has recommended home w/ supervision 24/7 and OT recommends home w/ HHC to follow. Speech recommend therapy 2-3 times per week. Pt lives at home alone her son, whom lives 10 mins away and checks on here 2-3 times a day. According to Karrie Green this can be done more frequently, but done can stay w/ her 24/7. Pt also has intermittent confusion. CM discussed safety and PT noted as pt is a potential fall risk. CM discussed SNF. Karrie Green states this is her mother normal and when she is in the hospital she has intermittent confusion and in clears when she is at home in her own surroundings and her mother has been weak and a bit unsteady at home, but gets around well w/ walker this is her norm. Daughter feels she will be safe at home and that she and her brother can manage. CM also discussed recommendation by NP Ramos Mena and med changes. POC and goal for pt by Karrie Peter home w/ [address] 78 and family support. 0930 received call by for Karrie Green whom states she spoke w/ her mother via phone, pt seems very paranoid. Karrie Green says this is what happens to her mother when med changes are made. Karrie Green reports her mother has hx paranoid schizophrenia every time her Rx changes she becomes paranoid, this has been happening since the mid 1's and if her mom does take her meds she will hallucinate. CM sent ps to Dr. Katty Colon regarding the above. CM discussed importance of 24/7 care and potential fall again w/ daughter. Pt had been to [facility] for 2 week in 2019. Santana Grigsby understanding of recommendation, but feels her moms mentation will clear up at home and her strength status is her norm. POC remain home w/ HHC, Seema agreeable to use [address] [place] [address] Rd.   CM will con't to follow interview deferred-pt at stress lab interview deferred

## 2020-11-06 NOTE — DIETITIAN INITIAL EVALUATION ADULT. - OTHER INFO
interview deferred -pt from assisted living  Intake : 100% as per flow sheet  Denies nausea/vomit : ?  Denies difficulty chewing /swallow :?  Denies diarrhea/constipation:?  Last BM : 11/3  NKFA   IBW +/- 10%= 142pounds  Ht: 66"  Ht taken from admission  Dosing ht: N/A  Usual Weight PTA: 3/2019: 140.2pounds  Dosing wt: N/A  BMI: 22.5  BMI calculated using wt from flow sheet  BMI calculated using ht from admission  wt used to calculate needs: current 139.5pounds  Education Provided : N/A  pressure injury: none  edema: none pt at nuclear stress test at time of visit-pt and paper chart not available-initial assessment due-interview deferred -pt from assisted living   Intake : 100% as per flow sheet  Denies nausea/vomit : ?  Denies difficulty chewing /swallow :?  Denies diarrhea/constipation:?  Last BM : 11/3  NKFA   IBW +/- 10%= 142pounds  Ht: 66"  Ht taken from admission  Dosing ht: N/A  Usual Weight PTA: 3/2019: 140.2pounds  Dosing wt: N/A  BMI: 22.5  BMI calculated using wt from flow sheet  BMI calculated using ht from admission  wt used to calculate needs: current 139.5pounds  Education Provided : N/A  pressure injury: none  edema: none

## 2020-11-06 NOTE — DISCHARGE NOTE PROVIDER - NSDCCAREPROVSEEN_GEN_ALL_CORE_FT
Kofi Sanchez  [facility] Medicine, Advance PracticeTeam

## 2020-11-06 NOTE — DISCHARGE NOTE PROVIDER - CARE PROVIDER_API CALL
Danny Padgett E  CARDIOLOGY  [address], [address]  [place] [address]  Phone: [phone]  Fax: [phone]  Follow Up Time:

## 2020-11-06 NOTE — DIETITIAN INITIAL EVALUATION ADULT. - PROBLEM SELECTOR PLAN 2
Patient with right pleural effusion, JVD, b/l LE edema, elevated BNP  Patient has elevated troponin, possibly 2/2 to CHF vs. RJ  Denies any chest pain  trend cardiac enzymes  robb start IV lasix 40mg q12 for now  monitor ins/outs and daily weights  check echocardiogram

## 2020-11-06 NOTE — DISCHARGE NOTE PROVIDER - NSDCMRMEDTOKEN_GEN_ALL_CORE_FT
amLODIPine 5 mg oral tablet: 1 tab(s) orally once a day  aspirin 81 mg oral delayed release tablet: 1 tab(s) orally once a day  atorvastatin 20 mg oral tablet: 1 tab(s) orally once a day (at bedtime)  Eliquis 2.5 mg oral tablet: 1 tab(s) orally 2 times a day  finasteride 5 mg oral tablet: 1 tab(s) orally once a day  isosorbide mononitrate 30 mg oral tablet, extended release: 1 tab(s) orally once a day (in the morning)  Lasix 20 mg oral tablet: 3 tab(s) orally once a day  levothyroxine 25 mcg (0.025 mg) oral tablet: 1 tab(s) orally once a day  Metoprolol Tartrate 50 mg oral tablet: 1 tab(s) orally once a day  simvastatin 10 mg oral tablet: 1 tab(s) orally once a day (at bedtime)  Tylenol 8 Hour 650 mg oral tablet, extended release: 2 tab(s) orally every 8 hours, As Needed for pain   aspirin 81 mg oral delayed release tablet: 1 tab(s) orally once a day  atorvastatin 20 mg oral tablet: 1 tab(s) orally once a day (at bedtime)  Eliquis 2.5 mg oral tablet: 1 tab(s) orally 2 times a day  finasteride 5 mg oral tablet: 1 tab(s) orally once a day  furosemide 40 mg oral tablet: 1 tab(s) orally once a day  isosorbide mononitrate 60 mg oral tablet, extended release: 1 tab(s) orally once a day  levothyroxine 50 mcg (0.05 mg) oral tablet: 1 tab(s) orally once a day  metoprolol succinate 100 mg oral tablet, extended release: 1 tab(s) orally once a day   acetaminophen 325 mg oral tablet: 2 tab(s) orally every 8 hours, As needed, Mild &amp; Moderate pain (1 - 6)  aspirin 81 mg oral delayed release tablet: 1 tab(s) orally once a day  atorvastatin 20 mg oral tablet: 1 tab(s) orally once a day (at bedtime)  Eliquis 2.5 mg oral tablet: 1 tab(s) orally 2 times a day  finasteride 5 mg oral tablet: 1 tab(s) orally once a day  furosemide 40 mg oral tablet: 1 tab(s) orally once a day  isosorbide mononitrate 60 mg oral tablet, extended release: 1 tab(s) orally once a day  levothyroxine 50 mcg (0.05 mg) oral tablet: 1 tab(s) orally once a day  metoprolol succinate 100 mg oral tablet, extended release: 1 tab(s) orally once a day

## 2020-11-06 NOTE — DIETITIAN INITIAL EVALUATION ADULT. - PROBLEM SELECTOR PLAN 1
Possibly 2/2 to pneumonia vs. CHF exacerbation  started on IV ceftriaxone, azithromycin, which will continue for now  check urine legionella  blood culture  check ESR, CRP, Procalcitonine  check echocardiogram  c/w IV diuretics

## 2020-11-06 NOTE — PROGRESS NOTE ADULT - ASSESSMENT
89 yo male with PMH of HTN, HLD, Bradycardia s/p PPM, on aspirin and eliquis, hypothyroidism, BPH, presents here from assisted living for hypoxia.     Acute respiratory failure with hypoxia.  - CHF exacerbation  - off ceftriaxone, azithromycin  - c/w IV diuretics.   - Pulmonary follow  - s/p thoracentesis 1400 cc    CHF exacerbation.   - Patient with right pleural effusion, JVD, b/l LE edema, elevated BNP  - Patient has elevated troponin, possibly 2/2 to CHF vs. RJ  - continue Lasix 40mg   - Cardiology follow  - Stress test pending     Hypothyroidism (acquired).  - c/w synthroid. Increase Synthroid dose to 50 mcg.     Hypertension.  - c/w isosorbide mononitrate, metoprolol, Norvasc Lasix     Hyperlipidemia.   - c/w atorvastatin.    BPH (benign prostatic hyperplasia).   - c/w finasteride.     Leg edema.  - b/l LE edema, likely 2/2 to heart failure  - [facility] duplex no DVT     H/O cardiac arrhythmia.    - ? hx of arrhythmia s/p PPM  - pacemaker interrogation  - restart Eliquis    - aspirin.     Prophylactic measure.  - Heparin while off Eliquis    PT    Kofi Sanchez MD pager [phone]

## 2020-11-06 NOTE — PROGRESS NOTE ADULT - SUBJECTIVE AND OBJECTIVE BOX
Patient is a 89y old  Male who presents with a chief complaint of hypoxia (06 Nov 2020 17:50)      SUBJECTIVE / OVERNIGHT EVENTS: feels better.  Review of Systems  chest pain no  palpitations no  sob improving   nausea no  headache no    MEDICATIONS  (STANDING):  apixaban 2.5 milliGRAM(s) Oral every 12 hours  aspirin enteric coated 81 milliGRAM(s) Oral daily  atorvastatin 20 milliGRAM(s) Oral at bedtime  finasteride 5 milliGRAM(s) Oral daily  isosorbide   mononitrate ER Tablet (IMDUR) 60 milliGRAM(s) Oral daily  levothyroxine 50 MICROGram(s) Oral daily  metoprolol succinate  milliGRAM(s) Oral daily    MEDICATIONS  (PRN):  acetaminophen   Tablet .. 650 milliGRAM(s) Oral every 8 hours PRN Mild Pain (1 - 3)      Vital Signs Last 24 Hrs  T(C): 36.7 (06 Nov 2020 19:22), Max: 36.7 (06 Nov 2020 05:23)  T(F): 98.1 (06 Nov 2020 19:22), Max: 98.1 (06 Nov 2020 19:22)  HR: 60 (06 Nov 2020 19:22) (60 - 76)  BP: 118/66 (06 Nov 2020 19:22) (113/63 - 138/69)  BP(mean): --  RR: 19 (06 Nov 2020 19:22) (16 - 19)  SpO2: 94% (06 Nov 2020 19:22) (88% - 98%)    PHYSICAL EXAM:  GENERAL: NAD, well-developed  HEAD:  Atraumatic, Normocephalic  EYES: EOMI, PERRLA, conjunctiva and sclera clear  NECK: Supple, No JVD  CHEST/LUNG: Clear to auscultation bilaterally; No wheeze  HEART: Regular rate and rhythm; No murmurs, rubs, or gallops  ABDOMEN: Soft, Nontender, Nondistended; Bowel sounds present  EXTREMITIES:  2+ Peripheral Pulses, No clubbing, cyanosis, or edema  PSYCH: AAOx3  NEUROLOGY: non-focal  SKIN: No rashes or lesions    LABS:                        11.0   10.72 )-----------( 341      ( 06 Nov 2020 05:33 )             34.8     11-06    130<L>  |  95<L>  |  32<H>  ----------------------------<  102<H>  4.6   |  25  |  1.74<H>    Ca    8.9      06 Nov 2020 05:33                  RADIOLOGY & ADDITIONAL TESTS:    Imaging Personally Reviewed:    Consultant(s) Notes Reviewed:      Care Discussed with Consultants/Other Providers:

## 2020-11-07 LAB
ALBUMIN SERPL ELPH-MCNC: 3.5 G/DL — SIGNIFICANT CHANGE UP (ref 3.3–5)
ALP SERPL-CCNC: 77 U/L — SIGNIFICANT CHANGE UP (ref 40–120)
ALT FLD-CCNC: 16 U/L — SIGNIFICANT CHANGE UP (ref 10–45)
ANION GAP SERPL CALC-SCNC: 10 MMOL/L — SIGNIFICANT CHANGE UP (ref 5–17)
AST SERPL-CCNC: 29 U/L — SIGNIFICANT CHANGE UP (ref 10–40)
BILIRUB SERPL-MCNC: 0.7 MG/DL — SIGNIFICANT CHANGE UP (ref 0.2–1.2)
BUN SERPL-MCNC: 33 MG/DL — HIGH (ref 7–23)
CALCIUM SERPL-MCNC: 8.8 MG/DL — SIGNIFICANT CHANGE UP (ref 8.4–10.5)
CHLORIDE SERPL-SCNC: 98 MMOL/L — SIGNIFICANT CHANGE UP (ref 96–108)
CO2 SERPL-SCNC: 26 MMOL/L — SIGNIFICANT CHANGE UP (ref 22–31)
CREAT SERPL-MCNC: 1.8 MG/DL — HIGH (ref 0.5–1.3)
GLUCOSE SERPL-MCNC: 76 MG/DL — SIGNIFICANT CHANGE UP (ref 70–99)
HCT VFR BLD CALC: 34.2 % — LOW (ref 39–50)
HGB BLD-MCNC: 10.9 G/DL — LOW (ref 13–17)
MCHC RBC-ENTMCNC: 30.8 PG — SIGNIFICANT CHANGE UP (ref 27–34)
MCHC RBC-ENTMCNC: 31.9 GM/DL — LOW (ref 32–36)
MCV RBC AUTO: 96.6 FL — SIGNIFICANT CHANGE UP (ref 80–100)
NRBC # BLD: 0 /100 WBCS — SIGNIFICANT CHANGE UP (ref 0–0)
PLATELET # BLD AUTO: 343 K/UL — SIGNIFICANT CHANGE UP (ref 150–400)
POTASSIUM SERPL-MCNC: 4.3 MMOL/L — SIGNIFICANT CHANGE UP (ref 3.5–5.3)
POTASSIUM SERPL-SCNC: 4.3 MMOL/L — SIGNIFICANT CHANGE UP (ref 3.5–5.3)
PROT SERPL-MCNC: 6.4 G/DL — SIGNIFICANT CHANGE UP (ref 6–8.3)
RBC # BLD: 3.54 M/UL — LOW (ref 4.2–5.8)
RBC # FLD: 16.5 % — HIGH (ref 10.3–14.5)
SARS-COV-2 RNA SPEC QL NAA+PROBE: SIGNIFICANT CHANGE UP
SODIUM SERPL-SCNC: 134 MMOL/L — LOW (ref 135–145)
WBC # BLD: 8.22 K/UL — SIGNIFICANT CHANGE UP (ref 3.8–10.5)
WBC # FLD AUTO: 8.22 K/UL — SIGNIFICANT CHANGE UP (ref 3.8–10.5)

## 2020-11-07 PROCEDURE — 71045 X-RAY EXAM CHEST 1 VIEW: CPT | Mod: 26

## 2020-11-07 RX ORDER — FUROSEMIDE 40 MG
40 TABLET ORAL DAILY
Refills: 0 | Status: DISCONTINUED | OUTPATIENT
Start: 2020-11-08 | End: 2020-11-09

## 2020-11-07 RX ADMIN — Medication 81 MILLIGRAM(S): at 11:50

## 2020-11-07 RX ADMIN — FINASTERIDE 5 MILLIGRAM(S): 5 TABLET, FILM COATED ORAL at 11:50

## 2020-11-07 RX ADMIN — APIXABAN 2.5 MILLIGRAM(S): 2.5 TABLET, FILM COATED ORAL at 17:39

## 2020-11-07 RX ADMIN — APIXABAN 2.5 MILLIGRAM(S): 2.5 TABLET, FILM COATED ORAL at 05:23

## 2020-11-07 RX ADMIN — Medication 50 MICROGRAM(S): at 05:23

## 2020-11-07 RX ADMIN — ATORVASTATIN CALCIUM 20 MILLIGRAM(S): 80 TABLET, FILM COATED ORAL at 21:01

## 2020-11-07 RX ADMIN — ISOSORBIDE MONONITRATE 60 MILLIGRAM(S): 60 TABLET, EXTENDED RELEASE ORAL at 11:50

## 2020-11-07 RX ADMIN — Medication 100 MILLIGRAM(S): at 05:23

## 2020-11-07 NOTE — PROGRESS NOTE ADULT - ASSESSMENT
89 yo male with PMH of HTN, HLD, Bradycardia s/p PPM, on aspirin and eliquis, hypothyroidism, BPH, presents here from assisted living for hypoxia.     Acute respiratory failure with hypoxia.  - CHF exacerbation  - off ceftriaxone, azithromycin  - c/w IV diuretics.   - Pulmonary follow  - s/p thoracentesis 1400 cc    CHF exacerbation.   - Patient with right pleural effusion, JVD, b/l LE edema, elevated BNP  - Patient has elevated troponin, possibly 2/2 to CHF vs. RJ  - continue Lasix 40mg   - Cardiology follow    Stress test abnormal  - cath vs medical Rx  - cardiology follow     Hypothyroidism (acquired).  - c/w synthroid. Increase Synthroid dose to 50 mcg.     Hypertension.  - c/w isosorbide mononitrate, metoprolol, Norvasc Lasix     Hyperlipidemia.   - c/w atorvastatin.    BPH (benign prostatic hyperplasia).   - c/w finasteride.     Leg edema.  - b/l LE edema, likely 2/2 to heart failure  - [facility] duplex no DVT     H/O cardiac arrhythmia.    - ? hx of arrhythmia s/p PPM  - pacemaker interrogation  - restart Eliquis    - aspirin.     Prophylactic measure.  - Heparin while off Eliquis    PT    Kofi Sanchez MD pager [phone]

## 2020-11-07 NOTE — PROGRESS NOTE ADULT - SUBJECTIVE AND OBJECTIVE BOX
INTERVAL HPI/OVERNIGHT EVENTS: patient feels well, breathing comfortably  Stress test + for ischemiawith udnerlying infarction    CXr reesidual atelectases    MEDICATIONS  (STANDING):  apixaban 2.5 milliGRAM(s) Oral every 12 hours  aspirin enteric coated 81 milliGRAM(s) Oral daily  atorvastatin 20 milliGRAM(s) Oral at bedtime  finasteride 5 milliGRAM(s) Oral daily  isosorbide   mononitrate ER Tablet (IMDUR) 60 milliGRAM(s) Oral daily  levothyroxine 50 MICROGram(s) Oral daily  metoprolol succinate  milliGRAM(s) Oral daily    MEDICATIONS  (PRN):  acetaminophen   Tablet .. 650 milliGRAM(s) Oral every 8 hours PRN Mild Pain (1 - 3)      Allergies    No Known Allergies    Intolerances      ROS:  General: Pt denies recent weight loss/fever/chills    Neurological: denies numbness or  sensation loss    Cardiovascular: denies chest pain/palpitations/leg edema    Respiratory and Thorax: denies SOB/cough/wheezing    Gastrointestinal: denies abdominal pain/diarrhea/constipation/bloody stool    Genitourinary: denies urinary frequency/urgency/ dysuria    Musculoskeletal: denies joint pain or swelling, denies restricted motion    Hematologic: denies abnormal bleeding  	    	  	    		        	    	            Vital Signs Last 24 Hrs  T(C): 36.8 (2020 19:47), Max: 36.9 (2020 05:20)  T(F): 98.2 (2020 19:47), Max: 98.4 (2020 05:20)  HR: 60 (2020 19:47) (55 - 68)  BP: 107/62 (2020 19:47) (107/62 - 130/67)  BP(mean): --  RR: 18 (2020 19:47) (18 - 18)  SpO2: 96% (2020 19:47) (94% - 97%)  Daily     Daily Weight in k.5 (2020 05:20)     @ 07:01  -   @ 07:00  --------------------------------------------------------  IN: 1670 mL / OUT: 525 mL / NET: 1145 mL     @ 07:01  -   @ 20:24  --------------------------------------------------------  IN: 970 mL / OUT: 250 mL / NET: 720 mL      Physical Exam:    wdwn male   no jvd    rrr  lung clear  abd soft   ext no edema      LABS:                        10.9   8.22  )-----------( 343      ( 2020 07:47 )             34.2         134<L>  |  98  |  33<H>  ----------------------------<  76  4.3   |  26  |  1.80<H>    Ca    8.8      2020 07:49    TPro  6.4  /  Alb  3.5  /  TBili  0.7  /  DBili  x   /  AST  29  /  ALT  16  /  AlkPhos  77            RADIOLOGY & ADDITIONAL TESTS:    TELE:    EKG:

## 2020-11-07 NOTE — PROGRESS NOTE ADULT - SUBJECTIVE AND OBJECTIVE BOX
Patient is a 89y old  Male who presents with a chief complaint of hypoxia (06 Nov 2020 17:50)      SUBJECTIVE / OVERNIGHT EVENTS: feels better  Review of Systems  chest pain no  palpitations no  sob no  nausea no  headache no    MEDICATIONS  (STANDING):  apixaban 2.5 milliGRAM(s) Oral every 12 hours  aspirin enteric coated 81 milliGRAM(s) Oral daily  atorvastatin 20 milliGRAM(s) Oral at bedtime  finasteride 5 milliGRAM(s) Oral daily  isosorbide   mononitrate ER Tablet (IMDUR) 60 milliGRAM(s) Oral daily  levothyroxine 50 MICROGram(s) Oral daily  metoprolol succinate  milliGRAM(s) Oral daily    MEDICATIONS  (PRN):  acetaminophen   Tablet .. 650 milliGRAM(s) Oral every 8 hours PRN Mild Pain (1 - 3)      Vital Signs Last 24 Hrs  T(C): 36.8 (07 Nov 2020 16:03), Max: 36.9 (07 Nov 2020 05:20)  T(F): 98.3 (07 Nov 2020 16:03), Max: 98.4 (07 Nov 2020 05:20)  HR: 55 (07 Nov 2020 16:03) (55 - 68)  BP: 117/61 (07 Nov 2020 16:03) (112/68 - 130/67)  BP(mean): --  RR: 18 (07 Nov 2020 16:03) (18 - 19)  SpO2: 95% (07 Nov 2020 16:03) (94% - 97%)    PHYSICAL EXAM:  GENERAL: NAD  HEAD:  Atraumatic, Normocephalic  EYES: EOMI, PERRLA, conjunctiva and sclera clear  NECK: Supple, No JVD  CHEST/LUNG: Clear to auscultation bilaterally; No wheeze  HEART: Regular rate and rhythm; No murmurs, rubs, or gallops  ABDOMEN: Soft, Nontender, Nondistended; Bowel sounds present  EXTREMITIES:  2+ Peripheral Pulses, No clubbing, cyanosis, or edema  PSYCH: AAOx3  NEUROLOGY: non-focal  SKIN: No rashes or lesions    LABS:                        10.9   8.22  )-----------( 343      ( 07 Nov 2020 07:47 )             34.2     11-07    134<L>  |  98  |  33<H>  ----------------------------<  76  4.3   |  26  |  1.80<H>    Ca    8.8      07 Nov 2020 07:49    TPro  6.4  /  Alb  3.5  /  TBili  0.7  /  DBili  x   /  AST  29  /  ALT  16  /  AlkPhos  77  11-07        < from: Nuclear Stress Test-Pharmacologic (Nuclear Stress Test-Pharmacologic .) (11.06.20 @ 09:57) >  IMPRESSIONS:Abnormal Study  * Chest Pain: No chest pain with administration of  Regadenoson.  * Symptom: No Symptom.  * HR Response: Appropriate.  * BP Response: Appropriate.  * Heart Rhythm: Paced Rhythm - 60 BPM.  * ECG Abnormalities: None.  * Arrhythmia: None.  * Review of raw data shows: The study is of adequate  technical quality  * The left ventricle was normal in size. There are large,  mild to moderate defects in basal anterolateral, basal to  mid inferolateral, basal to mid septal, inferior walls  that are partially reversible, suggestive of infarction  with moderate delvis-infarct ischemia.  * Patient could not perform prone imaging.  * Post-stress gated wall motion analysis was performed  (LVEF = 51 %;LVEDV = 73 ml.), revealing overall borderline  normal left ventricular systolic function. The basal  inferior and basal septal segments are hypokinetic.  *** No previous Nuclear/Stress exam.    < end of copied text >          RADIOLOGY & ADDITIONAL TESTS:    Imaging Personally Reviewed:    Consultant(s) Notes Reviewed:      Care Discussed with Consultants/Other Providers:

## 2020-11-07 NOTE — PROGRESS NOTE ADULT - ASSESSMENT
89 year male, h/o HTN hyperlipidemia hypothyroidism, h/o CAD , sick sinus syndrome,  PAF post [facility] PPM, now admitted with progressive pedal edema, orthopnea,  AFIB persistent x 5 months,  hypoxic with persistent right sided effusion , diastolic heart failure.    He has been diuresed, creatinine increasing, sodium decreasing. furosemide on hold. Weight slightly increased . stress test +  ischemia.     would restart furosemide 40 mg daily tomorow

## 2020-11-08 LAB
ANION GAP SERPL CALC-SCNC: 9 MMOL/L — SIGNIFICANT CHANGE UP (ref 5–17)
BUN SERPL-MCNC: 33 MG/DL — HIGH (ref 7–23)
CALCIUM SERPL-MCNC: 9 MG/DL — SIGNIFICANT CHANGE UP (ref 8.4–10.5)
CHLORIDE SERPL-SCNC: 98 MMOL/L — SIGNIFICANT CHANGE UP (ref 96–108)
CO2 SERPL-SCNC: 27 MMOL/L — SIGNIFICANT CHANGE UP (ref 22–31)
CREAT SERPL-MCNC: 1.51 MG/DL — HIGH (ref 0.5–1.3)
GLUCOSE SERPL-MCNC: 93 MG/DL — SIGNIFICANT CHANGE UP (ref 70–99)
HCT VFR BLD CALC: 32.2 % — LOW (ref 39–50)
HGB BLD-MCNC: 10.4 G/DL — LOW (ref 13–17)
MAGNESIUM SERPL-MCNC: 2.6 MG/DL — SIGNIFICANT CHANGE UP (ref 1.6–2.6)
MCHC RBC-ENTMCNC: 30.8 PG — SIGNIFICANT CHANGE UP (ref 27–34)
MCHC RBC-ENTMCNC: 32.3 GM/DL — SIGNIFICANT CHANGE UP (ref 32–36)
MCV RBC AUTO: 95.3 FL — SIGNIFICANT CHANGE UP (ref 80–100)
NRBC # BLD: 0 /100 WBCS — SIGNIFICANT CHANGE UP (ref 0–0)
PHOSPHATE SERPL-MCNC: 3.2 MG/DL — SIGNIFICANT CHANGE UP (ref 2.5–4.5)
PLATELET # BLD AUTO: 347 K/UL — SIGNIFICANT CHANGE UP (ref 150–400)
POTASSIUM SERPL-MCNC: 4.2 MMOL/L — SIGNIFICANT CHANGE UP (ref 3.5–5.3)
POTASSIUM SERPL-SCNC: 4.2 MMOL/L — SIGNIFICANT CHANGE UP (ref 3.5–5.3)
RBC # BLD: 3.38 M/UL — LOW (ref 4.2–5.8)
RBC # FLD: 16.5 % — HIGH (ref 10.3–14.5)
SODIUM SERPL-SCNC: 134 MMOL/L — LOW (ref 135–145)
WBC # BLD: 9.36 K/UL — SIGNIFICANT CHANGE UP (ref 3.8–10.5)
WBC # FLD AUTO: 9.36 K/UL — SIGNIFICANT CHANGE UP (ref 3.8–10.5)

## 2020-11-08 RX ADMIN — APIXABAN 2.5 MILLIGRAM(S): 2.5 TABLET, FILM COATED ORAL at 17:36

## 2020-11-08 RX ADMIN — Medication 100 MILLIGRAM(S): at 05:57

## 2020-11-08 RX ADMIN — Medication 40 MILLIGRAM(S): at 05:57

## 2020-11-08 RX ADMIN — FINASTERIDE 5 MILLIGRAM(S): 5 TABLET, FILM COATED ORAL at 11:25

## 2020-11-08 RX ADMIN — ISOSORBIDE MONONITRATE 60 MILLIGRAM(S): 60 TABLET, EXTENDED RELEASE ORAL at 11:25

## 2020-11-08 RX ADMIN — APIXABAN 2.5 MILLIGRAM(S): 2.5 TABLET, FILM COATED ORAL at 05:58

## 2020-11-08 RX ADMIN — ATORVASTATIN CALCIUM 20 MILLIGRAM(S): 80 TABLET, FILM COATED ORAL at 21:03

## 2020-11-08 RX ADMIN — Medication 81 MILLIGRAM(S): at 11:25

## 2020-11-08 RX ADMIN — Medication 50 MICROGRAM(S): at 05:57

## 2020-11-08 NOTE — PROGRESS NOTE ADULT - ASSESSMENT
89 year male, h/o HTN hyperlipidemia hypothyroidism, h/o CAD , sick sinus syndrome,  PAF post [facility] PPM, now admitted with progressive pedal edema, orthopnea,  AFIB persistent x 5 months,  hypoxic with persistent right sided effusion , diastolic heart failure. + stress test. With renal dysfunction and advanced age, will attempt medical therapy.     lasix just restarted today. check sodium and electrolytes tomorrow.     IF stable, for discharge and recheck labs in 2 days as OPT , follow up in office in one week

## 2020-11-08 NOTE — PROGRESS NOTE ADULT - PROBLEM SELECTOR PROBLEM 6
Sick sinus syndrome

## 2020-11-08 NOTE — PROGRESS NOTE ADULT - PROBLEM SELECTOR PROBLEM 1
Acute on chronic congestive heart failure, unspecified heart failure type

## 2020-11-08 NOTE — PROGRESS NOTE ADULT - PROBLEM SELECTOR PLAN 1
change lasix to 40 mg PO BID tomorrow, check daily weight    check ECHO and pharmacologic stress test
continue metoprolol 100 mg  XL daily, Imdur 60 mg daily.  furosemide  restarted, check sodium, electrolytes, weight
continue metoprolol 100 mg  XL daily, Imdur 60 mg daily.  furosemide to be restarted tomorrow
continue metoprolol 100 mg  XL daily, Imdur 60 mg daily. furosemide on hold . Post thoracentesis.  pharmacologic stress test pending. ? resume eliquis tomorrow
stop amlodipine, increase metoprolol to 100 XL daily, increase Imdur to 60 daily. furosemide on hold . for thoracentesis in AM. pharmacologic stress test pending.
stress testr demonstrates preserved EF, Bp mildly reduced,. will decrease amlodipne to 2.5 daily. will recheck CXR and BNPm, and if potassium repleted and still evidence of CHF, change furosemide back to 40 IV q 12, please obtain daily weights.    awaiting pharmacologic stress test  check ECHO and pharmacologic stress test
continue metoprolol 100 mg  XL daily, Imdur 60 mg daily.  furosemide on hold  due to hyponatremia , renal function stabilizing. If creatinine remains stable, will restart furosemide 40 mg daily tomorrow. .  pharmacologic stress test pending. ? resume eliquis tomorrow

## 2020-11-08 NOTE — PROGRESS NOTE ADULT - PROBLEM SELECTOR PLAN 2
antibiotics as per medicine and pulmonary    will recheck CXR in AM
antibiotics stopped by pulmonary, felt no active infection.  post thoracentesis.    CXR  +  RLL atelectasis verses pneumonia - f u with pulmonary
antibiotics stopped by pulmonary, felt no active infection.  post thoracentesis.    CXR  +  RLL atelectasis verses pneumonia - f u with pulmonary
antibiotics stopped by pulmonary, felt no active infection.  post thoracentesis.    will recheck CXR in AM
antibiotics stopped by pulmonary, felt no active infection.  post thoracentesis.    will recheck CXR  PA / Lat in AM , WBC mildly increased

## 2020-11-08 NOTE — PROGRESS NOTE ADULT - PROBLEM SELECTOR PROBLEM 5
Mixed hyperlipidemia

## 2020-11-08 NOTE — PROGRESS NOTE ADULT - PROBLEM SELECTOR PLAN 3
persistent AFIB, on metoprolol and Eliquis ? loss of atrial contraction with persistent AFIB. If no improvement ? start low dose amiodarone and cardiovert?
persistent AFIB, on metoprolol and eliquis
persistent AFIB, on metoprolol-  loss of atrial contraction with persistent AFIB.  Discussed with son option of amiodarone and elective cardioversion, patient's son would prefer to manage without cardioversion if possible
persistent AFIB, on metoprolol-  loss of atrial contraction with persistent AFIB.  Discussed with son option of amiodarone and elective cardioversion, patient's son would prefer to manage without cardioversion if possible
persistent AFIB, on metoprolol-  loss of atrial contraction with persistent AFIB. If no improvement ? start low dose amiodarone and cardiovert? restart eliquis in AM
persistent AFIB, on metoprolol-  loss of atrial contraction with persistent AFIB.  Discussed with son option of amiodarone and elective cardioversion, patient's son would prefer to manage without cardioversion if possible

## 2020-11-08 NOTE — PROGRESS NOTE ADULT - PROBLEM SELECTOR PLAN 4
check TFTs
check TFTs
mild hypothyroid? followup with medicine

## 2020-11-08 NOTE — PROGRESS NOTE ADULT - PROBLEM SELECTOR PROBLEM 3
H/O cardiac arrhythmia
Wound Care: Polysporin ointment

## 2020-11-08 NOTE — PROGRESS NOTE ADULT - ASSESSMENT
89 yo male with PMH of HTN, HLD, Bradycardia s/p PPM, on aspirin and eliquis, hypothyroidism, BPH, presents here from assisted living for hypoxia.     Acute respiratory failure with hypoxia.  - CHF exacerbation  - off ceftriaxone, azithromycin  - c/w IV diuretics.   - Pulmonary follow  - s/p thoracentesis 1400 cc    CHF exacerbation.   - Patient with right pleural effusion, JVD, b/l LE edema, elevated BNP  - Patient has elevated troponin, possibly 2/2 to CHF vs. RJ  - continue Lasix 40mg   - Cardiology follow    Stress test abnormal  - medical Rx  - d/w cardiology     Hypothyroidism (acquired).  - c/w synthroid. Increase Synthroid dose to 50 mcg.     Hypertension.  - c/w isosorbide mononitrate, metoprolol, Norvasc Lasix     Hyperlipidemia.   - c/w atorvastatin.    BPH (benign prostatic hyperplasia).   - c/w finasteride.     Leg edema.  - b/l LE edema, likely 2/2 to heart failure  - VA duplex no DVT     H/O cardiac arrhythmia.    - ? hx of arrhythmia s/p PPM  - pacemaker interrogation  - restart Eliquis    - aspirin.     Prophylactic measure.  - Heparin while off Eliquis    PT    DCP [facility]. COVID 19 swab.    Kofi Sanchez MD pager [phone]

## 2020-11-08 NOTE — PROGRESS NOTE ADULT - SUBJECTIVE AND OBJECTIVE BOX
INTERVAL HPI/OVERNIGHT EVENTS: patient feels well, less short of breath, maintaining 02 sat, no chest pain    MEDICATIONS  (STANDING):  apixaban 2.5 milliGRAM(s) Oral every 12 hours  aspirin enteric coated 81 milliGRAM(s) Oral daily  atorvastatin 20 milliGRAM(s) Oral at bedtime  finasteride 5 milliGRAM(s) Oral daily  furosemide    Tablet 40 milliGRAM(s) Oral daily  isosorbide   mononitrate ER Tablet (IMDUR) 60 milliGRAM(s) Oral daily  levothyroxine 50 MICROGram(s) Oral daily  metoprolol succinate  milliGRAM(s) Oral daily    MEDICATIONS  (PRN):  acetaminophen   Tablet .. 650 milliGRAM(s) Oral every 8 hours PRN Mild Pain (1 - 3)      Allergies    No Known Allergies    Intolerances      ROS:  General: Pt denies recent weight loss/fever/chills    Neurological: denies numbness or  sensation loss    Cardiovascular: denies chest pain/palpitations/leg edema    Respiratory and Thorax: denies SOB/cough/wheezing    Gastrointestinal: denies abdominal pain/diarrhea/constipation/bloody stool    Genitourinary: denies urinary frequency/urgency/ dysuria    Musculoskeletal: denies joint pain or swelling, denies restricted motion    Hematologic: denies abnormal bleeding  	    	  	    		        	    	            Vital Signs Last 24 Hrs  T(C): 37.1 (08 Nov 2020 19:26), Max: 37.1 (08 Nov 2020 19:26)  T(F): 98.7 (08 Nov 2020 19:26), Max: 98.7 (08 Nov 2020 19:26)  HR: 60 (08 Nov 2020 19:26) (60 - 87)  BP: 136/55 (08 Nov 2020 19:26) (118/80 - 136/55)  BP(mean): --  RR: 18 (08 Nov 2020 19:26) (18 - 18)  SpO2: 95% (08 Nov 2020 19:26) (95% - 97%)  Daily     Daily     11-07 @ 07:01  -  11-08 @ 07:00  --------------------------------------------------------  IN: 1570 mL / OUT: 490 mL / NET: 1080 mL    11-08 @ 07:01  -  11-08 @ 20:45  --------------------------------------------------------  IN: 1310 mL / OUT: 800 mL / NET: 510 mL      Physical Exam:    wdwn male  no JVD  cor RRR  lung clear  abd soft   ext no edema      LABS:                        10.4   9.36  )-----------( 347      ( 08 Nov 2020 07:14 )             32.2     11-08    134<L>  |  98  |  33<H>  ----------------------------<  93  4.2   |  27  |  1.51<H>    Ca    9.0      08 Nov 2020 07:14  Phos  3.2     11-08  Mg     2.6     11-08    TPro  6.4  /  Alb  3.5  /  TBili  0.7  /  DBili  x   /  AST  29  /  ALT  16  /  AlkPhos  77  11-07          RADIOLOGY & ADDITIONAL TESTS:    TELE:    EKG:

## 2020-11-08 NOTE — PROGRESS NOTE ADULT - PROBLEM SELECTOR PROBLEM 4
Hypothyroidism (acquired)

## 2020-11-08 NOTE — PROGRESS NOTE ADULT - SUBJECTIVE AND OBJECTIVE BOX
Patient is a 89y old  Male who presents with a chief complaint of hypoxia (07 Nov 2020 20:24)      SUBJECTIVE / OVERNIGHT EVENTS: feels better  Review of Systems  chest pain no  palpitations no  sob improving   nausea no  headache no    MEDICATIONS  (STANDING):  apixaban 2.5 milliGRAM(s) Oral every 12 hours  aspirin enteric coated 81 milliGRAM(s) Oral daily  atorvastatin 20 milliGRAM(s) Oral at bedtime  finasteride 5 milliGRAM(s) Oral daily  furosemide    Tablet 40 milliGRAM(s) Oral daily  isosorbide   mononitrate ER Tablet (IMDUR) 60 milliGRAM(s) Oral daily  levothyroxine 50 MICROGram(s) Oral daily  metoprolol succinate  milliGRAM(s) Oral daily    MEDICATIONS  (PRN):  acetaminophen   Tablet .. 650 milliGRAM(s) Oral every 8 hours PRN Mild Pain (1 - 3)      Vital Signs Last 24 Hrs  T(C): 36.6 (08 Nov 2020 13:55), Max: 36.8 (07 Nov 2020 16:03)  T(F): 97.8 (08 Nov 2020 13:55), Max: 98.3 (07 Nov 2020 16:03)  HR: 87 (08 Nov 2020 13:55) (55 - 87)  BP: 118/80 (08 Nov 2020 13:55) (107/62 - 130/73)  BP(mean): --  RR: 18 (08 Nov 2020 13:55) (18 - 18)  SpO2: 97% (08 Nov 2020 13:55) (95% - 97%)    PHYSICAL EXAM:  GENERAL: NAD, well-developed  HEAD:  Atraumatic, Normocephalic  EYES: EOMI, PERRLA, conjunctiva and sclera clear  NECK: Supple, No JVD  CHEST/LUNG: Clear to auscultation bilaterally; No wheeze  HEART: Regular rate and rhythm; No murmurs, rubs, or gallops  ABDOMEN: Soft, Nontender, Nondistended; Bowel sounds present  EXTREMITIES:  2+ Peripheral Pulses, No clubbing, cyanosis, or edema  PSYCH: AAOx3  NEUROLOGY: non-focal  SKIN: No rashes or lesions    LABS:                        10.4   9.36  )-----------( 347      ( 08 Nov 2020 07:14 )             32.2     11-08    134<L>  |  98  |  33<H>  ----------------------------<  93  4.2   |  27  |  1.51<H>    Ca    9.0      08 Nov 2020 07:14  Phos  3.2     11-08  Mg     2.6     11-08    TPro  6.4  /  Alb  3.5  /  TBili  0.7  /  DBili  x   /  AST  29  /  ALT  16  /  AlkPhos  77  11-07                RADIOLOGY & ADDITIONAL TESTS:    Imaging Personally Reviewed:    Consultant(s) Notes Reviewed:      Care Discussed with Consultants/Other Providers:

## 2020-11-09 ENCOUNTER — TRANSCRIPTION ENCOUNTER (OUTPATIENT)
Age: 85
End: 2020-11-09

## 2020-11-09 VITALS
OXYGEN SATURATION: 93 % | TEMPERATURE: 98 F | HEART RATE: 60 BPM | DIASTOLIC BLOOD PRESSURE: 62 MMHG | RESPIRATION RATE: 18 BRPM | SYSTOLIC BLOOD PRESSURE: 127 MMHG

## 2020-11-09 LAB
ANION GAP SERPL CALC-SCNC: 8 MMOL/L — SIGNIFICANT CHANGE UP (ref 5–17)
BUN SERPL-MCNC: 30 MG/DL — HIGH (ref 7–23)
CALCIUM SERPL-MCNC: 8.9 MG/DL — SIGNIFICANT CHANGE UP (ref 8.4–10.5)
CHLORIDE SERPL-SCNC: 99 MMOL/L — SIGNIFICANT CHANGE UP (ref 96–108)
CO2 SERPL-SCNC: 27 MMOL/L — SIGNIFICANT CHANGE UP (ref 22–31)
CREAT SERPL-MCNC: 1.63 MG/DL — HIGH (ref 0.5–1.3)
GLUCOSE SERPL-MCNC: 93 MG/DL — SIGNIFICANT CHANGE UP (ref 70–99)
HCT VFR BLD CALC: 37.4 % — LOW (ref 39–50)
HGB BLD-MCNC: 11.6 G/DL — LOW (ref 13–17)
MCHC RBC-ENTMCNC: 29.7 PG — SIGNIFICANT CHANGE UP (ref 27–34)
MCHC RBC-ENTMCNC: 31 GM/DL — LOW (ref 32–36)
MCV RBC AUTO: 95.7 FL — SIGNIFICANT CHANGE UP (ref 80–100)
NRBC # BLD: 0 /100 WBCS — SIGNIFICANT CHANGE UP (ref 0–0)
PLATELET # BLD AUTO: 400 K/UL — SIGNIFICANT CHANGE UP (ref 150–400)
POTASSIUM SERPL-MCNC: 4.3 MMOL/L — SIGNIFICANT CHANGE UP (ref 3.5–5.3)
POTASSIUM SERPL-SCNC: 4.3 MMOL/L — SIGNIFICANT CHANGE UP (ref 3.5–5.3)
RBC # BLD: 3.91 M/UL — LOW (ref 4.2–5.8)
RBC # FLD: 16.3 % — HIGH (ref 10.3–14.5)
SODIUM SERPL-SCNC: 134 MMOL/L — LOW (ref 135–145)
WBC # BLD: 10.48 K/UL — SIGNIFICANT CHANGE UP (ref 3.8–10.5)
WBC # FLD AUTO: 10.48 K/UL — SIGNIFICANT CHANGE UP (ref 3.8–10.5)

## 2020-11-09 PROCEDURE — 97110 THERAPEUTIC EXERCISES: CPT

## 2020-11-09 PROCEDURE — 82803 BLOOD GASES ANY COMBINATION: CPT

## 2020-11-09 PROCEDURE — 32555 ASPIRATE PLEURA W/ IMAGING: CPT

## 2020-11-09 PROCEDURE — 84443 ASSAY THYROID STIM HORMONE: CPT

## 2020-11-09 PROCEDURE — 85018 HEMOGLOBIN: CPT

## 2020-11-09 PROCEDURE — 80061 LIPID PANEL: CPT

## 2020-11-09 PROCEDURE — 71045 X-RAY EXAM CHEST 1 VIEW: CPT

## 2020-11-09 PROCEDURE — 96374 THER/PROPH/DIAG INJ IV PUSH: CPT

## 2020-11-09 PROCEDURE — 85014 HEMATOCRIT: CPT

## 2020-11-09 PROCEDURE — 80053 COMPREHEN METABOLIC PANEL: CPT

## 2020-11-09 PROCEDURE — 78452 HT MUSCLE IMAGE SPECT MULT: CPT

## 2020-11-09 PROCEDURE — 85027 COMPLETE CBC AUTOMATED: CPT

## 2020-11-09 PROCEDURE — A9500: CPT

## 2020-11-09 PROCEDURE — 82435 ASSAY OF BLOOD CHLORIDE: CPT

## 2020-11-09 PROCEDURE — 85025 COMPLETE CBC W/AUTO DIFF WBC: CPT

## 2020-11-09 PROCEDURE — 85610 PROTHROMBIN TIME: CPT

## 2020-11-09 PROCEDURE — 71250 CT THORAX DX C-: CPT

## 2020-11-09 PROCEDURE — 80048 BASIC METABOLIC PNL TOTAL CA: CPT

## 2020-11-09 PROCEDURE — 76770 US EXAM ABDO BACK WALL COMP: CPT

## 2020-11-09 PROCEDURE — 85652 RBC SED RATE AUTOMATED: CPT

## 2020-11-09 PROCEDURE — 86769 SARS-COV-2 COVID-19 ANTIBODY: CPT

## 2020-11-09 PROCEDURE — 84439 ASSAY OF FREE THYROXINE: CPT

## 2020-11-09 PROCEDURE — 83605 ASSAY OF LACTIC ACID: CPT

## 2020-11-09 PROCEDURE — 93970 EXTREMITY STUDY: CPT

## 2020-11-09 PROCEDURE — 84132 ASSAY OF SERUM POTASSIUM: CPT

## 2020-11-09 PROCEDURE — 82330 ASSAY OF CALCIUM: CPT

## 2020-11-09 PROCEDURE — 83735 ASSAY OF MAGNESIUM: CPT

## 2020-11-09 PROCEDURE — 81003 URINALYSIS AUTO W/O SCOPE: CPT

## 2020-11-09 PROCEDURE — 97530 THERAPEUTIC ACTIVITIES: CPT

## 2020-11-09 PROCEDURE — 87086 URINE CULTURE/COLONY COUNT: CPT

## 2020-11-09 PROCEDURE — 84145 PROCALCITONIN (PCT): CPT

## 2020-11-09 PROCEDURE — 82947 ASSAY GLUCOSE BLOOD QUANT: CPT

## 2020-11-09 PROCEDURE — 87040 BLOOD CULTURE FOR BACTERIA: CPT

## 2020-11-09 PROCEDURE — 84100 ASSAY OF PHOSPHORUS: CPT

## 2020-11-09 PROCEDURE — 85730 THROMBOPLASTIN TIME PARTIAL: CPT

## 2020-11-09 PROCEDURE — 97162 PT EVAL MOD COMPLEX 30 MIN: CPT

## 2020-11-09 PROCEDURE — 93306 TTE W/DOPPLER COMPLETE: CPT

## 2020-11-09 PROCEDURE — 93005 ELECTROCARDIOGRAM TRACING: CPT

## 2020-11-09 PROCEDURE — U0003: CPT

## 2020-11-09 PROCEDURE — 99285 EMERGENCY DEPT VISIT HI MDM: CPT | Mod: 25

## 2020-11-09 PROCEDURE — 87449 NOS EACH ORGANISM AG IA: CPT

## 2020-11-09 PROCEDURE — 84295 ASSAY OF SERUM SODIUM: CPT

## 2020-11-09 PROCEDURE — 97116 GAIT TRAINING THERAPY: CPT

## 2020-11-09 PROCEDURE — A9505: CPT

## 2020-11-09 PROCEDURE — 93017 CV STRESS TEST TRACING ONLY: CPT

## 2020-11-09 PROCEDURE — 84484 ASSAY OF TROPONIN QUANT: CPT

## 2020-11-09 PROCEDURE — 86140 C-REACTIVE PROTEIN: CPT

## 2020-11-09 PROCEDURE — 83880 ASSAY OF NATRIURETIC PEPTIDE: CPT

## 2020-11-09 PROCEDURE — 96375 TX/PRO/DX INJ NEW DRUG ADDON: CPT

## 2020-11-09 RX ORDER — ISOSORBIDE MONONITRATE 60 MG/1
1 TABLET, EXTENDED RELEASE ORAL
Qty: 0 | Refills: 0 | DISCHARGE

## 2020-11-09 RX ORDER — ATORVASTATIN CALCIUM 80 MG/1
1 TABLET, FILM COATED ORAL
Qty: 0 | Refills: 0 | DISCHARGE

## 2020-11-09 RX ORDER — METOPROLOL TARTRATE 50 MG
1 TABLET ORAL
Qty: 0 | Refills: 0 | DISCHARGE
Start: 2020-11-09

## 2020-11-09 RX ORDER — SIMVASTATIN 20 MG/1
1 TABLET, FILM COATED ORAL
Qty: 0 | Refills: 0 | DISCHARGE

## 2020-11-09 RX ORDER — FUROSEMIDE 40 MG
1 TABLET ORAL
Qty: 0 | Refills: 0 | DISCHARGE
Start: 2020-11-09

## 2020-11-09 RX ORDER — METOPROLOL TARTRATE 50 MG
1 TABLET ORAL
Qty: 0 | Refills: 0 | DISCHARGE

## 2020-11-09 RX ORDER — METOPROLOL TARTRATE 50 MG
1 TABLET ORAL
Qty: 30 | Refills: 0
Start: 2020-11-09 | End: 2020-12-08

## 2020-11-09 RX ORDER — ISOSORBIDE MONONITRATE 60 MG/1
1 TABLET, EXTENDED RELEASE ORAL
Qty: 0 | Refills: 0 | DISCHARGE
Start: 2020-11-09

## 2020-11-09 RX ORDER — LEVOTHYROXINE SODIUM 125 MCG
1 TABLET ORAL
Qty: 0 | Refills: 0 | DISCHARGE
Start: 2020-11-09

## 2020-11-09 RX ORDER — FUROSEMIDE 40 MG
1 TABLET ORAL
Qty: 30 | Refills: 0
Start: 2020-11-09 | End: 2020-12-08

## 2020-11-09 RX ORDER — AMLODIPINE BESYLATE 2.5 MG/1
1 TABLET ORAL
Qty: 0 | Refills: 0 | DISCHARGE

## 2020-11-09 RX ORDER — LEVOTHYROXINE SODIUM 125 MCG
1 TABLET ORAL
Qty: 0 | Refills: 0 | DISCHARGE

## 2020-11-09 RX ORDER — FUROSEMIDE 40 MG
3 TABLET ORAL
Qty: 0 | Refills: 0 | DISCHARGE

## 2020-11-09 RX ORDER — ISOSORBIDE MONONITRATE 60 MG/1
1 TABLET, EXTENDED RELEASE ORAL
Qty: 30 | Refills: 0
Start: 2020-11-09 | End: 2020-12-08

## 2020-11-09 RX ADMIN — Medication 100 MILLIGRAM(S): at 06:33

## 2020-11-09 RX ADMIN — APIXABAN 2.5 MILLIGRAM(S): 2.5 TABLET, FILM COATED ORAL at 18:06

## 2020-11-09 RX ADMIN — FINASTERIDE 5 MILLIGRAM(S): 5 TABLET, FILM COATED ORAL at 12:16

## 2020-11-09 RX ADMIN — Medication 40 MILLIGRAM(S): at 06:33

## 2020-11-09 RX ADMIN — Medication 50 MICROGRAM(S): at 06:33

## 2020-11-09 RX ADMIN — Medication 81 MILLIGRAM(S): at 12:16

## 2020-11-09 RX ADMIN — APIXABAN 2.5 MILLIGRAM(S): 2.5 TABLET, FILM COATED ORAL at 06:33

## 2020-11-09 RX ADMIN — ISOSORBIDE MONONITRATE 60 MILLIGRAM(S): 60 TABLET, EXTENDED RELEASE ORAL at 12:16

## 2020-11-09 NOTE — PROGRESS NOTE ADULT - ASSESSMENT
89 yo male with PMH of HTN, HLD, Bradycardia s/p PPM, on aspirin and eliquis, hypothyroidism, BPH, presents here from assisted living for hypoxia.     Acute respiratory failure with hypoxia.  - CHF exacerbation  - off ceftriaxone, azithromycin  - c/w  diuretics.   - Pulmonary follow  - s/p thoracentesis 1400 cc    CHF exacerbation.   - Patient with right pleural effusion, JVD, b/l LE edema, elevated BNP  - Patient has elevated troponin, possibly 2/2 to CHF vs. RJ  - continue Lasix 40mg   - Cardiology follow    Stress test abnormal  - medical Rx  - d/w cardiology . Cleared for DC.    Hypothyroidism (acquired).  - c/w synthroid. Increase Synthroid dose to 50 mcg.   - Follow TSH    Hypertension.  - c/w isosorbide mononitrate, metoprolol, Norvasc Lasix     Hyperlipidemia.   - c/w atorvastatin.    BPH (benign prostatic hyperplasia).   - c/w finasteride.     Leg edema.  - b/l LE edema, likely 2/2 to heart failure  - VA duplex no DVT     H/O cardiac arrhythmia.    - ? hx of arrhythmia s/p PPM  - pacemaker interrogation  - continue Eliquis    - aspirin.     Prophylactic measure.  - Heparin while off Eliquis    PT    DC [facility]. COVID 19 negative.    Message left for son.    Kofi Sanchez MD pager [phone]

## 2020-11-09 NOTE — PROGRESS NOTE ADULT - SUBJECTIVE AND OBJECTIVE BOX
[facility] - [facility] - PROGRESS NOTE    CHIEF COMPLAINT: hypoxemia; pleural effusions; atelectasis; lung nodule    INTERVAL HISTORY: stress test with preserved LV function and areas of infarction with delvis-infarct ischemia; s/p large volume thoracentesis  - 1400cc - clear yellow fluid - no labs sent; no shortness of breath at rest or with ambulation on room air;  no cough, sputum production, chest congestion or wheeze; no fevers, chills or sweats; no chest pain/pressure or palpitations; improved renal function now back on diuretics; resolved lower extremity swelling right > left;      REVIEW OF SYSTEMS:  Constitutional: As per interval history  HEENT: Within normal limits  CV: As per interval history  Resp: As per interval history  GI: Within normal limits   : Within normal limits  Musculoskeletal: Within normal limits  Skin: Within normal limits  Neurological: Within normal limits  Psychiatric: Within normal limits  Endocrine: Within normal limits  Hematologic/Lymphatic: Within normal limits  Allergic/Immunologic: Within normal limits    MEDICATIONS:     Pulmonary "    Anti-microbials:    Cardiovascular:  furosemide    Tablet 40 milliGRAM(s) Oral daily  isosorbide   mononitrate ER Tablet (IMDUR) 60 milliGRAM(s) Oral daily  metoprolol succinate  milliGRAM(s) Oral daily    Other:  apixaban 2.5 milliGRAM(s) Oral every 12 hours  aspirin enteric coated 81 milliGRAM(s) Oral daily  atorvastatin 20 milliGRAM(s) Oral at bedtime  finasteride 5 milliGRAM(s) Oral daily  levothyroxine 50 MICROGram(s) Oral daily    MEDICATIONS  (PRN):  acetaminophen   Tablet .. 650 milliGRAM(s) Oral every 8 hours PRN Mild Pain (1 - 3)        OBJECTIVE:    I&O's Detail    2020 07:  -  2020 07:00  --------------------------------------------------------  IN:    Oral Fluid: 1650 mL  Total IN: 1650 mL    OUT:    Voided (mL): 950 mL  Total OUT: 950 mL    Total NET: 700 mL      2020 07:  -  2020 11:12  --------------------------------------------------------  IN:    Oral Fluid: 240 mL  Total IN: 240 mL    OUT:    Voided (mL): 500 mL  Total OUT: 500 mL    Total NET: -260 mL    PHYSICAL EXAM:       ICU Vital Signs Last 24 Hrs  T(C): 36.7 (2020 05:56), Max: 37.1 (2020 19:26)  T(F): 98.1 (2020 05:56), Max: 98.7 (2020 19:26)  HR: 68 (2020 10:41) (60 - 87)  BP: 125/68 (2020 10:41) (118/80 - 136/55)  BP(mean): --  ABP: --  ABP(mean): --  RR: 18 (2020 10:41) (16 - 18)  SpO2: 96% (2020 10:41) (95% - 97%) on room air    General: Awake. Alert. Cooperative. No distress. Appears stated age.	  HEENT: Atraumatic. Normocephalic. Anicteric. Normal oral mucosa. PERRL. EOMI.  Neck: Supple. Trachea midline. Thyroid without enlargement/tenderness/nodules. No carotid bruit. No JVD.	  Cardiovascular: Irregularly irregular rate and rhythm. S1 S2 normal. No murmurs, rubs or gallops.  Respiratory: Respirations unlabored. Improved breath sounds right hemithorax. No curvature.  Abdomen: Soft. Non-tender. Non-distended. No organomegaly. No masses. Normal bowel sounds.  Extremities: Warm to touch. No clubbing or cyanosis. Resolved pretibial edema right > left. Lower extremity venous stasis changes  Pulses: 2+ peripheral pulses all extremities.	  Skin: Normal skin color. No rashes or lesions. No ecchymoses. No cyanosis. Warm to touch.  Lymph Nodes: Cervical, supraclavicular and axillary nodes normal  Neurological: Motor and sensory examination equal and normal. A and O x 3  Psychiatry: Appropriate mood and affect.    LABS:                          11.6   10.48 )-----------( 400      ( 2020 07:02 )             37.4     CBC    WBC  10.48 <==, 9.36 <==, 8.22 <==, 10.72 <==, 9.22 <==, 8.88 <==, 8.82 <==    Hemoglobin  11.6 <<==, 10.4 <<==, 10.9 <<==, 11.0 <<==, 11.1 <<==, 11.0 <<==, 11.5 <<==    Hematocrit  37.4 <==, 32.2 <==, 34.2 <==, 34.8 <==, 34.6 <==, 34.6 <==, 35.7 <==    Platelets  400 <==, 347 <==, 343 <==, 341 <==, 348 <==, 362 <==, 368 <==      134<L>  |  99  |  30<H>  ----------------------------<  93    11-09  4.3   |  27  |  1.63<H>      LYTES    sodium  134 <==, 134 <==, 134 <==, 130 <==, 130 <==, 134 <==, 133 <==    potassium   4.3 <==, 4.2 <==, 4.3 <==, 4.6 <==, 4.8 <==, 4.8 <==, 3.3 <==    chloride  99 <==, 98 <==, 98 <==, 95 <==, 93 <==, 95 <==, 92 <==    carbon dioxide  27 <==, 27 <==, 26 <==, 25 <==, 24 <==, 28 <==, 27 <==    =============================================================================================  RENAL FUNCTION:    Creatinine:   1.63  <<==, 1.51  <<==, 1.80  <<==, 1.74  <<==, 1.79  <<==, 2.03  <<==, 1.55  <<==    BUN:   30 <==, 33 <==, 33 <==, 32 <==, 33 <==, 30 <==, 25 <==    ============================================================================================    calcium   8.9 <==, 9.0 <==, 8.8 <==, 8.9 <==, 9.3 <==, 9.2 <==, 9.1 <==    phos   3.2 <==    mag   2.6 <==    ============================================================================================  LFTs    AST:   29 <==     ALT:  16  <==     AP:  77  <=    Bili:  0.7  <=    PT/INR - ( 31 Oct 2020 19:56 )   PT: 15.0 sec;   INR: 1.26 ratio      Procalcitonin, Serum: 0.15 ng/mL ( @ 07:06)    Serum Pro-Brain Natriuretic Peptide: 3619 pg/mL ( @ 07:18)  Serum Pro-Brain Natriuretic Peptide: 2696 pg/mL (10-31 @ 19:56)    CARDIAC MARKERS ( 2020 07:06 )  CPK x     /CKMB x     /CKMB Units x        troponin 71 ng/L    CARDIAC MARKERS ( 2020 00:27 )  CPK x     /CKMB x     /CKMB Units x        troponin 70 ng/L    CARDIAC MARKERS ( 31 Oct 2020 19:56 )  CPK x     /CKMB x     /CKMB Units x        troponin 72 ng/L    < from: Transthoracic Echocardiogram (20 @ 08:20) >    Patient name: CLEMENTE BULLOCK  YOB: 1925   Age: 89 (M)   MR#: [id number]  Study Date: 11/3/2020  Location: [address] [place], [place] [id number]: Sheridan Hernandez MAX  Study quality: Technically fair  Referring Physician: Kofi Sanchez MD  Blood Pressure: 156/67 mmHg  Height: 168 cm  Weight: 64 kg  BSA: 1.7 m2  Heart Rhythm: Atrial flutter  ------------------------------------------------------------------------  PROCEDURE: Transthoracic echocardiogram with 2-D, M-Mode  and complete spectral and color flow Doppler.  INDICATION: Dyspnea, unspecified (R06.00)  ------------------------------------------------------------------------  Dimensions:    Normal Values:  LA:     4.5    2.0 - 4.0 cm  Ao:     2.7    2.0 - 3.8 cm  SEPTUM: 1.3    0.6 - 1.2 cm  PWT:    1.3    0.6 - 1.1 cm  LVIDd:  4.3    3.0 - 5.6 cm  LVIDs:  2.5    1.8 - 4.0 cm  Derived variables:  LVMI: 121 g/m2  RWT: 0.60  Fractional short: 42 %  EF (Visual Estimate): 75 %  Doppler Peak Velocity (m/sec): AoV=1.7 TV=3.2  ------------------------------------------------------------------------  Observations:  Mitral Valve: Normal mitral valve. Mitral annular  calcification. Mild mitral regurgitation.  Aortic Valve/Aorta: Calcified aortic valve with normal  opening.  Normal aortic root size.  Left Atrium: Moderate left atrial enlargement.  Left Ventricle: Normal left ventricular internal  dimensions. Mild-moderate concentric hypertrophy.  Hyperdynamic left ventricle.  Right Heart: Mild right atrial enlargement. Normal right  ventricular size and function.  A device wire is noted in the right heart.  Normal tricuspid valve. Mild tricuspid regurgitation.  Normal pulmonic valve. Mild pulmonic regurgitation.  Pericardium/Pleura: Normal pericardium with no pericardial  effusion.  Hemodynamic: Estimated right atrial pressure is normal.  Mild-moderate pulmonary hypertension. Estimated PASP 45  mmHg.  ------------------------------------------------------------------------  Conclusions:  Hyperdynamic left ventricle.  Mild-moderate pulmonary hypertension.  A device wire is noted in the right heart.  ------------------------------------------------------------------------  Confirmed on  11/3/2020 - 10:17:57 by Miguel Dolan MD, FASE  ------------------------------------------------------------------------    < end of copied text >  ---------------------------------------------------------------------------------------------------------------    MICROBIOLOGY:     Legionella pneumophila Antigen, Urine (20 @ 13:40)   Legionella Antigen, Urine: Negative: Negative Testing method: Immunochromatographic Assay. L. pneumpohila   serogroup 1 antigen in urine NOT detected, suggesting NO recent or   current infection. Infection due to Legionella cannot be ruled out: other   serogroups and species may cause disease, antigen may not be present in   urine in early infection, or the level of antigens in urine may be below   the detection limit of the test. Order “Culture –Legionella” is   recommended for uncommon cases of suspected Legionella pneumonia due to   organisms other than L. pneumophila serogroup 1.   Urinalysis Basic - ( 31 Oct 2020 20:28 )    Color: Light Yellow / Appearance: Clear / S.012 / pH: x  Gluc: x / Ketone: Negative  / Bili: Negative / Urobili: Negative   Blood: x / Protein: Negative / Nitrite: Negative   Leuk Esterase: Negative / RBC: x / WBC x   Sq Epi: x / Non Sq Epi: x / Bacteria: x    Culture - Urine (20 @ 02:06)   Specimen Source: .Urine Clean Catch (Midstream)   Culture Results:   <10,000 CFU/mL Normal Urogenital Mimi   ---------------------------------------------------------------------------------------------------------------  Culture - Blood (10.31.20 @ 22:12)   Specimen Source: .Blood Blood-Peripheral   Culture Results:   No growth to date.   ---------------------------------------------------------------------------------------------------------------  Culture - Blood (10.31.20 @ 22:12)   Specimen Source: .Blood Blood-Peripheral   Culture Results:   No growth to date.   ---------------------------------------------------------------------------------------------------------------    RADIOLOGY:  [x] Chest radiographs reviewed and interpreted by me    < from: Xray Chest 1 View AP/PA (20 @ 08:50) >    EXAM:  XR CHEST AP OR PA 1V                          PROCEDURE DATE:  2020      Impression:    The heart is slightly enlarged. Right pleural effusion. Right lower lobe pneumonia and/or atelectasis. No pneumothorax. The left lung appears to be clear. A pacer is in good position.    TITI GALLARDO MD; Attending Radiologist  This document has been electronically signed. 2020 10:52AM    < end of copied text >  ---------------------------------------------------------------------------------------------------------------  < from: Xray Chest 1 View AP/PA (20 @ 14:54) >    EXAM:  XR CHEST AP OR PA 1V                          PROCEDURE DATE:  2020      INTERPRETATION:  Portable chest xray: r/o PTX    Comparison: Most recent prior    FINDINGS:    Lines/Tubes: In expected locations    Heart and mediastinum:  Unchanged in appearance.    Lungs, pleura, and airways: Interval decrease in size of right pleural effusion following thoracentesis. No pneumothorax. Patchy airspace disease in the right mid to lower lung is unchanged    Bones and soft tissues: Thebones and soft tissues are unchanged.    Impression:    No pneumothorax following thoracentesis.    FAVIO KNOTT MD; Attending Radiologist  This document has been electronically signed. 2020  2:51PM    < end of copied text >  ---------------------------------------------------------------------------------------------------------------  < from: Xray Chest 1 View- PORTABLE-Routine (Xray Chest 1 View- PORTABLE-Routine in AM.) (20 @ 09:19) >    EXAM:  XR CHEST PORTABLE ROUTINE 1V                          PROCEDURE DATE:  2020      INTERPRETATION:  XR CHEST. One view.    INDICATION: Hypoxia    COMPARISON: 2020    FINDINGS/  IMPRESSION:    The left pacer is unchanged..    Right pleural effusion and right basilar opacities are again noted.    ANNA ROSA MD; Attending Radiologist  This document has been electronically signed. 2020  9:57AM    < end of copied text >  ---------------------------------------------------------------------------------------------------------------  < from: CT Chest No Cont (20 @ 13:24) >    EXAM:  CT CHEST                          PROCEDURE DATE:  2020      INTERPRETATION:  CLINICAL INDICATION: Shortness of breath, CHF, coronary artery disease.    Axial CT images of the chest are obtained without intravenous administration of contrast.    No prior chest CTs are available for comparison.    Left cardiac device with the leads terminating within the right atrium and right ventricle.    No enlarged axillary, mediastinal or hilar lymph nodes. Multiple subcentimeter nonspecific mediastinal lymph nodes. Cardiomegaly. No pericardial effusion. Diffuse atherosclerotic disease with involvement of the aorta and the coronary arteries. Aortic valve calcifications.    Evaluation of the upper abdomen demonstrate nodularity of the hepatic surface may represent cirrhosis.    Small bilateral pleural effusions, right greater than left containing areas of loculation on the right.    Evaluation of the lung parenchyma demonstrate bilateral interlobular septal thickening, right greater than left suggestive of pulmonary edema. Right lower lobe partial compressive atelectasis. 2 mm bilateral calcified granulomas.    Left lower lobe peripheral curvilinear opacity, part of which measures about 2.1 x 1.2 cm on image 392 of series 4 with some associated calcification. Some surrounding left lower lobe parenchymal lucency is noted which can be seen in the setting of pulmonary air entrapment.    No central endobronchial lesions. Secretions within the trachea.    Degenerative changes of the spine and the shoulders.    IMPRESSION: Small bilateral pleural effusions, right greater than left with interlobular septal thickening suggestive of pulmonary edema. Cardiomegaly with coronary artery atherosclerotic disease.    Right lower lobe partial compressive atelectasis. Please note evaluation of the atelectatic portion of the right lower lobe is limited due to lack of intravenous contrast.    Left lower lobe peripheral 2.1 x 1.2 cm curvilinear nodule is of unclear etiology may be due to impacted dilated airway given surrounding lung parenchymal lucency.    3 month follow-up chest CT is recommended for further evaluation of the above findings.    TRISTAN REA MD; Attending Radiologist  This document has been electronically signed. 2020  1:46PM    < end of copied text >  ---------------------------------------------------------------------------------------------------------------  < from: VA Duplex Lower Ext Vein Scan, Chika (20 @ 11:59) >    EXAM:  DUPLEX SCAN EXT VEINS LOWER BI                          PROCEDURE DATE:  2020      FINDINGS:    There is normal compressibility of the bilateral common femoral, femoral and popliteal veins.  Doppler examination shows normal spontaneous and phasic flow.    No calf vein thrombosis is detected.    IMPRESSION:  No evidence of deep venous thrombosis in either lower extremity.    GUDELIA MEEKS M.D., ATTENDING RADIOLOGIST  This document has been electronically signed. 2020 12:13PM    < end of copied text >  ---------------------------------------------------------------------------------------------------------------

## 2020-11-09 NOTE — PROGRESS NOTE ADULT - SUBJECTIVE AND OBJECTIVE BOX
Patient is a 89y old  Male who presents with a chief complaint of hypoxia (08 Nov 2020 20:44)      SUBJECTIVE / OVERNIGHT EVENTS: Comfortable without new complaints.   Review of Systems  chest pain no  palpitations no  sob no  nausea no  headache no    MEDICATIONS  (STANDING):  apixaban 2.5 milliGRAM(s) Oral every 12 hours  aspirin enteric coated 81 milliGRAM(s) Oral daily  atorvastatin 20 milliGRAM(s) Oral at bedtime  finasteride 5 milliGRAM(s) Oral daily  furosemide    Tablet 40 milliGRAM(s) Oral daily  isosorbide   mononitrate ER Tablet (IMDUR) 60 milliGRAM(s) Oral daily  levothyroxine 50 MICROGram(s) Oral daily  metoprolol succinate  milliGRAM(s) Oral daily    MEDICATIONS  (PRN):  acetaminophen   Tablet .. 650 milliGRAM(s) Oral every 8 hours PRN Mild Pain (1 - 3)      Vital Signs Last 24 Hrs  T(C): 36.5 (09 Nov 2020 11:13), Max: 36.7 (09 Nov 2020 05:56)  T(F): 97.7 (09 Nov 2020 11:13), Max: 98.1 (09 Nov 2020 05:56)  HR: 60 (09 Nov 2020 11:13) (60 - 68)  BP: 127/62 (09 Nov 2020 11:13) (123/65 - 132/66)  BP(mean): --  RR: 18 (09 Nov 2020 11:13) (16 - 18)  SpO2: 93% (09 Nov 2020 11:13) (93% - 96%)    PHYSICAL EXAM:  GENERAL: NAD  HEAD:  Atraumatic, Normocephalic  EYES: EOMI, PERRLA, conjunctiva and sclera clear  NECK: Supple, No JVD  CHEST/LUNG: Clear to auscultation bilaterally; No wheeze  HEART: Regular rate and rhythm; No murmurs, rubs, or gallops  ABDOMEN: Soft, Nontender, Nondistended; Bowel sounds present  EXTREMITIES:  2+ Peripheral Pulses, No clubbing, cyanosis, or edema  PSYCH: AAOx3  NEUROLOGY: non-focal  SKIN: No rashes or lesions    LABS:                        11.6   10.48 )-----------( 400      ( 09 Nov 2020 07:02 )             37.4     11-09    134<L>  |  99  |  30<H>  ----------------------------<  93  4.3   |  27  |  1.63<H>    Ca    8.9      09 Nov 2020 07:02  Phos  3.2     11-08  Mg     2.6     11-08                  RADIOLOGY & ADDITIONAL TESTS:    Imaging Personally Reviewed:    Consultant(s) Notes Reviewed:      Care Discussed with Consultants/Other Providers:

## 2020-11-09 NOTE — PROGRESS NOTE ADULT - ASSESSMENT
ASSESSMENT:    dyspnea/hypoxemia due to exacerbation of chronic CHF - ECHO with hyperdynamic LV, mild-moderate pulmonary hypertension and no significant valvular heart disease - stress test with preserved LVEF and infarcts with delvis-infarct ischemia - RJ with diuresis has improved    abnormal chest CT  1) small bilateral pleural effusions right > left with areas of loculation on the right and associated atelectasis - s/p large volume right thoracentesis - 1400cc - yellow fluid  2) bilateral interlobular septal thickening suggestive of pulmonary edema  3) left lower lobe peripheral opacity with some associated calcification suggesting chronicity - lucent area of surrounding lung parenchyma suggestive of air trapping -> suspect mucous plugging rather than neoplasm  4) while it is impossible to definitively "rule out" pneumonia within the atelectatic lung, I believe that the patient is without infection in the absence of leukocytosis, fever, an elevated procalcitonin level or positive cultures    persistent atrial fibrillation    PLAN/RECOMMENDATIONS:    stable oxygenation on room air  observe off antibiotics  observe off pulmonary medications  follow-up CT scan in 3 months to reevaluate the pulmonary "nodule"  cardiology follow-up - pacemaker adjusted - hoping to avoid amiodarone and cardioversion  cardiac meds: ASA/eliquis/lipitor/imdur/toprol XL/lasix      Will follow with you. Plan of care discussed with the patient at bedside.      Aneesh Salter MD, [facility]  [phone]  Pulmonary Medicine

## 2020-11-09 NOTE — DISCHARGE NOTE NURSING/CASE MANAGEMENT/SOCIAL WORK - PATIENT PORTAL LINK FT
You can access the FollowMyHealth Patient Portal offered by [facility] by registering at the following website: [email or web address]. By joining [employer]’s FollowMyHealth portal, you will also be able to view your health information using other applications (apps) compatible with our system.

## 2020-11-09 NOTE — PROGRESS NOTE ADULT - REASON FOR ADMISSION
hypoxia

## 2020-12-10 ENCOUNTER — INPATIENT (INPATIENT)
Facility: HOSPITAL | Age: 85
LOS: 4 days | Discharge: HOME | DRG: 280 | End: 2020-12-15
Attending: INTERNAL MEDICINE | Admitting: INTERNAL MEDICINE
Payer: MEDICARE

## 2020-12-10 VITALS
HEART RATE: 60 BPM | TEMPERATURE: 98 F | HEIGHT: 66 IN | DIASTOLIC BLOOD PRESSURE: 77 MMHG | SYSTOLIC BLOOD PRESSURE: 136 MMHG | OXYGEN SATURATION: 94 % | RESPIRATION RATE: 18 BRPM

## 2020-12-10 DIAGNOSIS — Z90.89 ACQUIRED ABSENCE OF OTHER ORGANS: Chronic | ICD-10-CM

## 2020-12-10 PROCEDURE — 93010 ELECTROCARDIOGRAM REPORT: CPT

## 2020-12-10 PROCEDURE — 99285 EMERGENCY DEPT VISIT HI MDM: CPT

## 2020-12-10 NOTE — ED PROVIDER NOTE - CLINICAL SUMMARY MEDICAL DECISION MAKING FREE TEXT BOX
Howie PGY-3:  89yo M w/ pmhx as described in HPI here w/ cc of fall, does not remember fall now with bruising, extensive cardiac hx concern for syncope resulting in fall which pt does not remember, will obtain CT's to assess for trauam sequleae and admit for further care/management

## 2020-12-10 NOTE — ED PROVIDER NOTE - CARE PLAN
Principal Discharge DX:	Syncope and collapse  Secondary Diagnosis:	Closed fracture of multiple ribs of right side, initial encounter

## 2020-12-10 NOTE — ED PROVIDER NOTE - ATTENDING CONTRIBUTION TO CARE
Afebrile. Awake and Alert. Heart atrauamtic. Lungs CTA. Heart RRR. Abdomen soft NTND. CN II-XII grossly intact. Moves all extremities without lateralization. Afebrile. Awake and Alert. Heart atraumatic. No mid-line CS TTP. Lungs CTA. Heart RRR. Abdomen soft NTND. CN II-XII grossly intact. Moves all extremities without lateralization. Diffuse ecchymosis over right chest/back/abdomen with linear slanted hematoma with overlying scabbing over right upper back.    Last known fall 2 weeks ago when patients admits he tried to take a shower without assistance p/w subacute appearing ecchymosis to torso. No CP/SOB/palpitations/ h/o pacemaker    Trauma imaging of CTH/CTCS/CTCAP r/o ICH and Fx  Syncope evaluation

## 2020-12-10 NOTE — ED PROVIDER NOTE - PHYSICAL EXAMINATION
General: well appearing, interactive, well nourished, NAD  HEENT: pupils equal and reactive, normal external ears bilaterally   Cardiac: RRR, no MRG appreciated  Resp: lungs clear to auscultation bilaterally, symmetric chest wall rise  Abd: soft, nontender, nondistended,   : no CVA tenderness  Neuro: Moving all extremities, alert and oriented to self/place/time  Skin:  normal color for race, bruising/ecchymosis along thorax/abdomen/back

## 2020-12-10 NOTE — ED PROVIDER NOTE - PROGRESS NOTE DETAILS
Howie PGY-3:  D/W pt's cardiologist who states to get CT C/A/P and admit, will see pt in morning as EP consult Howie PGY-3:  In context of fall benefit of obtaining CT outweights risk despite gfr 29, will obtain CT's Howie PGY-3:  In context of fall benefit of obtaining CT outweighs risk despite gfr 29, will obtain CT's Howie PGY-3:  Surgery paged Howie PGY-3:  Surgery resident states still has not discussed pt w/ attending, awaiting final surg recs Howie PGY-3:  Surgery states does not need surgical intervention at this time, will admit to medicine for syncopal workup.

## 2020-12-10 NOTE — ED PROVIDER NOTE - OBJECTIVE STATEMENT
[facility] Occupational Therapy Triage Note  No skilled OT services required    Occupational therapy order received and discharged following established triage process with  Physical therapy.      PT completed the following to assess the need  for skilled OT services.      Clock drawing and cognitive screen for cognitive and ADL/selfcare  deficits completed by the physical therapist (see results and discharge recommendations in PT plan of care note).   Results indicate that no acute care level Occupational therapy services are indicated.      Post acute recommendations:    OT:  No skilled OT services indicated at this time.       PT:           Recommendation for Discharge: PT: Home (07/20/19 9305)       .     Shanthi Sifuentes RN:  unclear what happened, tech went to assist pt and saw bruising, states this is new and not consistent with pts statement of having feel two weeks ago. 89yo M pmhx HTN, CAD, SSS S/P pacemaker, afib on AC pw cc of fall    States fell two weeks ago and people at nursing home noticed today prompting them to send him to ED. States no pain, feels ok. States does not remember event.    Shanthi Sifuentes RN@ nursing home:  unclear what happened, tech went to assist pt and saw bruising, states this is new and not consistent with pts statement of having fell two weeks ago, states this had to happen in past few days

## 2020-12-11 DIAGNOSIS — S22.41XA MULTIPLE FRACTURES OF RIBS, RIGHT SIDE, INITIAL ENCOUNTER FOR CLOSED FRACTURE: ICD-10-CM

## 2020-12-11 DIAGNOSIS — I50.33 ACUTE ON CHRONIC DIASTOLIC (CONGESTIVE) HEART FAILURE: ICD-10-CM

## 2020-12-11 DIAGNOSIS — Z95.0 PRESENCE OF CARDIAC PACEMAKER: ICD-10-CM

## 2020-12-11 DIAGNOSIS — I21.4 NON-ST ELEVATION (NSTEMI) MYOCARDIAL INFARCTION: ICD-10-CM

## 2020-12-11 DIAGNOSIS — R55 SYNCOPE AND COLLAPSE: ICD-10-CM

## 2020-12-11 PROBLEM — Z86.79 PERSONAL HISTORY OF OTHER DISEASES OF THE CIRCULATORY SYSTEM: Chronic | Status: ACTIVE | Noted: 2020-11-01

## 2020-12-11 LAB
ALBUMIN SERPL ELPH-MCNC: 3.6 G/DL — SIGNIFICANT CHANGE UP (ref 3.3–5)
ALBUMIN SERPL ELPH-MCNC: 4.1 G/DL — SIGNIFICANT CHANGE UP (ref 3.3–5)
ALP SERPL-CCNC: 104 U/L — SIGNIFICANT CHANGE UP (ref 40–120)
ALP SERPL-CCNC: 110 U/L — SIGNIFICANT CHANGE UP (ref 40–120)
ALT FLD-CCNC: 33 U/L — SIGNIFICANT CHANGE UP (ref 10–45)
ALT FLD-CCNC: 35 U/L — SIGNIFICANT CHANGE UP (ref 10–45)
ANION GAP SERPL CALC-SCNC: 11 MMOL/L — SIGNIFICANT CHANGE UP (ref 5–17)
ANION GAP SERPL CALC-SCNC: 12 MMOL/L — SIGNIFICANT CHANGE UP (ref 5–17)
APPEARANCE UR: CLEAR — SIGNIFICANT CHANGE UP
AST SERPL-CCNC: 44 U/L — HIGH (ref 10–40)
AST SERPL-CCNC: 45 U/L — HIGH (ref 10–40)
BASOPHILS # BLD AUTO: 0.05 K/UL — SIGNIFICANT CHANGE UP (ref 0–0.2)
BASOPHILS NFR BLD AUTO: 0.6 % — SIGNIFICANT CHANGE UP (ref 0–2)
BILIRUB SERPL-MCNC: 1 MG/DL — SIGNIFICANT CHANGE UP (ref 0.2–1.2)
BILIRUB SERPL-MCNC: 1.3 MG/DL — HIGH (ref 0.2–1.2)
BILIRUB UR-MCNC: NEGATIVE — SIGNIFICANT CHANGE UP
BUN SERPL-MCNC: 34 MG/DL — HIGH (ref 7–23)
BUN SERPL-MCNC: 40 MG/DL — HIGH (ref 7–23)
CALCIUM SERPL-MCNC: 8.9 MG/DL — SIGNIFICANT CHANGE UP (ref 8.4–10.5)
CALCIUM SERPL-MCNC: 9 MG/DL — SIGNIFICANT CHANGE UP (ref 8.4–10.5)
CHLORIDE SERPL-SCNC: 97 MMOL/L — SIGNIFICANT CHANGE UP (ref 96–108)
CHLORIDE SERPL-SCNC: 98 MMOL/L — SIGNIFICANT CHANGE UP (ref 96–108)
CK MB BLD-MCNC: 3.6 % — HIGH (ref 0–3.5)
CK MB BLD-MCNC: 3.8 % — HIGH (ref 0–3.5)
CK MB CFR SERPL CALC: 10 NG/ML — HIGH (ref 0–6.7)
CK MB CFR SERPL CALC: 10.9 NG/ML — HIGH (ref 0–6.7)
CK SERPL-CCNC: 280 U/L — HIGH (ref 30–200)
CK SERPL-CCNC: 287 U/L — HIGH (ref 30–200)
CO2 SERPL-SCNC: 25 MMOL/L — SIGNIFICANT CHANGE UP (ref 22–31)
CO2 SERPL-SCNC: 26 MMOL/L — SIGNIFICANT CHANGE UP (ref 22–31)
COLOR SPEC: SIGNIFICANT CHANGE UP
CREAT SERPL-MCNC: 1.76 MG/DL — HIGH (ref 0.5–1.3)
CREAT SERPL-MCNC: 1.93 MG/DL — HIGH (ref 0.5–1.3)
CULTURE RESULTS: SIGNIFICANT CHANGE UP
DIFF PNL FLD: NEGATIVE — SIGNIFICANT CHANGE UP
EOSINOPHIL # BLD AUTO: 0.23 K/UL — SIGNIFICANT CHANGE UP (ref 0–0.5)
EOSINOPHIL NFR BLD AUTO: 2.9 % — SIGNIFICANT CHANGE UP (ref 0–6)
GLUCOSE SERPL-MCNC: 143 MG/DL — HIGH (ref 70–99)
GLUCOSE SERPL-MCNC: 95 MG/DL — SIGNIFICANT CHANGE UP (ref 70–99)
GLUCOSE UR QL: NEGATIVE — SIGNIFICANT CHANGE UP
HCT VFR BLD CALC: 33.2 % — LOW (ref 39–50)
HGB BLD-MCNC: 10.3 G/DL — LOW (ref 13–17)
IMM GRANULOCYTES NFR BLD AUTO: 0.3 % — SIGNIFICANT CHANGE UP (ref 0–1.5)
KETONES UR-MCNC: NEGATIVE — SIGNIFICANT CHANGE UP
LEUKOCYTE ESTERASE UR-ACNC: NEGATIVE — SIGNIFICANT CHANGE UP
LIDOCAIN IGE QN: 44 U/L — SIGNIFICANT CHANGE UP (ref 7–60)
LIDOCAIN IGE QN: 67 U/L — HIGH (ref 7–60)
LYMPHOCYTES # BLD AUTO: 1.12 K/UL — SIGNIFICANT CHANGE UP (ref 1–3.3)
LYMPHOCYTES # BLD AUTO: 14.3 % — SIGNIFICANT CHANGE UP (ref 13–44)
MAGNESIUM SERPL-MCNC: 2.3 MG/DL — SIGNIFICANT CHANGE UP (ref 1.6–2.6)
MCHC RBC-ENTMCNC: 27.7 PG — SIGNIFICANT CHANGE UP (ref 27–34)
MCHC RBC-ENTMCNC: 31 GM/DL — LOW (ref 32–36)
MCV RBC AUTO: 89.2 FL — SIGNIFICANT CHANGE UP (ref 80–100)
MONOCYTES # BLD AUTO: 1.09 K/UL — HIGH (ref 0–0.9)
MONOCYTES NFR BLD AUTO: 13.9 % — SIGNIFICANT CHANGE UP (ref 2–14)
NEUTROPHILS # BLD AUTO: 5.31 K/UL — SIGNIFICANT CHANGE UP (ref 1.8–7.4)
NEUTROPHILS NFR BLD AUTO: 68 % — SIGNIFICANT CHANGE UP (ref 43–77)
NITRITE UR-MCNC: NEGATIVE — SIGNIFICANT CHANGE UP
NRBC # BLD: 0 /100 WBCS — SIGNIFICANT CHANGE UP (ref 0–0)
PH UR: 6 — SIGNIFICANT CHANGE UP (ref 5–8)
PHOSPHATE SERPL-MCNC: 3.6 MG/DL — SIGNIFICANT CHANGE UP (ref 2.5–4.5)
PLATELET # BLD AUTO: 309 K/UL — SIGNIFICANT CHANGE UP (ref 150–400)
POTASSIUM SERPL-MCNC: 3.9 MMOL/L — SIGNIFICANT CHANGE UP (ref 3.5–5.3)
POTASSIUM SERPL-MCNC: 4 MMOL/L — SIGNIFICANT CHANGE UP (ref 3.5–5.3)
POTASSIUM SERPL-SCNC: 3.9 MMOL/L — SIGNIFICANT CHANGE UP (ref 3.5–5.3)
POTASSIUM SERPL-SCNC: 4 MMOL/L — SIGNIFICANT CHANGE UP (ref 3.5–5.3)
PROT SERPL-MCNC: 6.3 G/DL — SIGNIFICANT CHANGE UP (ref 6–8.3)
PROT SERPL-MCNC: 6.7 G/DL — SIGNIFICANT CHANGE UP (ref 6–8.3)
PROT UR-MCNC: ABNORMAL
RBC # BLD: 3.72 M/UL — LOW (ref 4.2–5.8)
RBC # FLD: 16.4 % — HIGH (ref 10.3–14.5)
SARS-COV-2 RNA SPEC QL NAA+PROBE: SIGNIFICANT CHANGE UP
SODIUM SERPL-SCNC: 134 MMOL/L — LOW (ref 135–145)
SODIUM SERPL-SCNC: 135 MMOL/L — SIGNIFICANT CHANGE UP (ref 135–145)
SP GR SPEC: 1.02 — SIGNIFICANT CHANGE UP (ref 1.01–1.02)
SPECIMEN SOURCE: SIGNIFICANT CHANGE UP
TROPONIN T, HIGH SENSITIVITY RESULT: 103 NG/L — HIGH (ref 0–51)
TROPONIN T, HIGH SENSITIVITY RESULT: 112 NG/L — HIGH (ref 0–51)
UROBILINOGEN FLD QL: NEGATIVE — SIGNIFICANT CHANGE UP
WBC # BLD: 7.82 K/UL — SIGNIFICANT CHANGE UP (ref 3.8–10.5)
WBC # FLD AUTO: 7.82 K/UL — SIGNIFICANT CHANGE UP (ref 3.8–10.5)

## 2020-12-11 PROCEDURE — 70450 CT HEAD/BRAIN W/O DYE: CPT | Mod: 26

## 2020-12-11 PROCEDURE — 93280 PM DEVICE PROGR EVAL DUAL: CPT | Mod: 26

## 2020-12-11 PROCEDURE — 71260 CT THORAX DX C+: CPT | Mod: 26

## 2020-12-11 PROCEDURE — 74177 CT ABD & PELVIS W/CONTRAST: CPT | Mod: 26

## 2020-12-11 RX ORDER — FINASTERIDE 5 MG/1
5 TABLET, FILM COATED ORAL DAILY
Refills: 0 | Status: DISCONTINUED | OUTPATIENT
Start: 2020-12-11 | End: 2020-12-15

## 2020-12-11 RX ORDER — ATORVASTATIN CALCIUM 80 MG/1
20 TABLET, FILM COATED ORAL AT BEDTIME
Refills: 0 | Status: DISCONTINUED | OUTPATIENT
Start: 2020-12-11 | End: 2020-12-15

## 2020-12-11 RX ORDER — LEVOTHYROXINE SODIUM 125 MCG
50 TABLET ORAL DAILY
Refills: 0 | Status: DISCONTINUED | OUTPATIENT
Start: 2020-12-11 | End: 2020-12-15

## 2020-12-11 RX ORDER — METOPROLOL TARTRATE 50 MG
100 TABLET ORAL DAILY
Refills: 0 | Status: DISCONTINUED | OUTPATIENT
Start: 2020-12-11 | End: 2020-12-15

## 2020-12-11 RX ORDER — ISOSORBIDE MONONITRATE 60 MG/1
60 TABLET, EXTENDED RELEASE ORAL DAILY
Refills: 0 | Status: DISCONTINUED | OUTPATIENT
Start: 2020-12-11 | End: 2020-12-12

## 2020-12-11 RX ORDER — FUROSEMIDE 40 MG
40 TABLET ORAL DAILY
Refills: 0 | Status: DISCONTINUED | OUTPATIENT
Start: 2020-12-11 | End: 2020-12-11

## 2020-12-11 RX ORDER — ACETAMINOPHEN 500 MG
650 TABLET ORAL EVERY 6 HOURS
Refills: 0 | Status: DISCONTINUED | OUTPATIENT
Start: 2020-12-11 | End: 2020-12-15

## 2020-12-11 RX ORDER — ASPIRIN/CALCIUM CARB/MAGNESIUM 324 MG
81 TABLET ORAL DAILY
Refills: 0 | Status: DISCONTINUED | OUTPATIENT
Start: 2020-12-11 | End: 2020-12-15

## 2020-12-11 RX ORDER — APIXABAN 2.5 MG/1
2.5 TABLET, FILM COATED ORAL
Refills: 0 | Status: DISCONTINUED | OUTPATIENT
Start: 2020-12-11 | End: 2020-12-13

## 2020-12-11 RX ORDER — SODIUM CHLORIDE 9 MG/ML
1000 INJECTION, SOLUTION INTRAVENOUS
Refills: 0 | Status: DISCONTINUED | OUTPATIENT
Start: 2020-12-11 | End: 2020-12-11

## 2020-12-11 RX ORDER — FUROSEMIDE 40 MG
40 TABLET ORAL DAILY
Refills: 0 | Status: DISCONTINUED | OUTPATIENT
Start: 2020-12-12 | End: 2020-12-13

## 2020-12-11 RX ORDER — ACETAMINOPHEN 500 MG
650 TABLET ORAL ONCE
Refills: 0 | Status: COMPLETED | OUTPATIENT
Start: 2020-12-11 | End: 2020-12-11

## 2020-12-11 RX ADMIN — ATORVASTATIN CALCIUM 20 MILLIGRAM(S): 80 TABLET, FILM COATED ORAL at 22:03

## 2020-12-11 RX ADMIN — ISOSORBIDE MONONITRATE 60 MILLIGRAM(S): 60 TABLET, EXTENDED RELEASE ORAL at 12:01

## 2020-12-11 RX ADMIN — APIXABAN 2.5 MILLIGRAM(S): 2.5 TABLET, FILM COATED ORAL at 22:03

## 2020-12-11 RX ADMIN — FINASTERIDE 5 MILLIGRAM(S): 5 TABLET, FILM COATED ORAL at 12:01

## 2020-12-11 RX ADMIN — APIXABAN 2.5 MILLIGRAM(S): 2.5 TABLET, FILM COATED ORAL at 12:01

## 2020-12-11 RX ADMIN — Medication 100 MILLIGRAM(S): at 12:02

## 2020-12-11 RX ADMIN — Medication 40 MILLIGRAM(S): at 12:01

## 2020-12-11 RX ADMIN — Medication 650 MILLIGRAM(S): at 04:25

## 2020-12-11 RX ADMIN — SODIUM CHLORIDE 40 MILLILITER(S): 9 INJECTION, SOLUTION INTRAVENOUS at 02:42

## 2020-12-11 RX ADMIN — Medication 81 MILLIGRAM(S): at 12:01

## 2020-12-11 RX ADMIN — Medication 650 MILLIGRAM(S): at 03:55

## 2020-12-11 NOTE — PROGRESS NOTE ADULT - ASSESSMENT
89 year old man with PMH of HTN, CAD, SSS s.p Pacemaker, hypothyroidism, Afib, R effusion, and HF who presents after fall 2 weeks ago with 5/6/7/8 R rib fx.    Plan:  - agree with continued workup by medicine for possible syncope  - patient fall 2w old, no current complaints, IS 1000, sat 100%; no intervention required  - R pleural effusion in likely chronic, not clinically significant   - Please call back trauma with any further questions or concerns       Trauma Surgery [phone]

## 2020-12-11 NOTE — ED ADULT NURSE NOTE - OBJECTIVE STATEMENT
89 year old male presents to ED via EMS with c/o bruising. Patient has notable bruising to right upper back and right groin. As per RN at nursing home, patient has fallen multiple times in the last week and they noticed new onset bruising this evening. Pt does not remember incidents of falling, hx of dementia but A&Ox3 upon arrival to ED.  Patient denies any pain or discomfort.  Full ROM. No c/o SOB, N/V/D, abd pain, back pain, fever or chills. 89 year old male presents to ED via EMS with c/o bruising. Patient has notable bruising to right upper back and right groin. As per RN at nursing home, patient has fallen multiple times in the last week and they noticed new onset bruising this evening. Pt does not remember incidents of falling, hx of dementia but A&Ox3 upon arrival to ED. Patient does experience periods of forgetfulness. Patient denies any pain or discomfort.  Full ROM. No c/o SOB, N/V/D, abd pain, back pain, fever or chills. 89 year old male presents to ED via EMS with c/o bruising. Patient has notable bruising to right upper back and right groin. As per RN at nursing home, patient has fallen multiple times in the last week and they noticed new onset bruising this evening. Pt does not remember incidents of falling, hx of dementia but A&Ox2, disoriented to situation upon arrival to ED. Patient does experience periods of forgetfulness. Patient denies any pain or discomfort.  Full ROM. No c/o SOB, N/V/D, abd pain, back pain, fever or chills. 89 year old male presents to ED via EMS from [facility] with c/o bruising. Patient has notable bruising to right upper back and right groin. As per RN at AL home, patient has fallen multiple times in the last week and they noticed new onset bruising this evening. Pt does not remember incidents of falling, hx of dementia but A&Ox2, disoriented to situation upon arrival to ED. Patient does experience periods of forgetfulness. Patient denies any pain or discomfort.  Full ROM. No c/o SOB, N/V/D, abd pain, back pain, fever or chills.

## 2020-12-11 NOTE — ED ADULT NURSE REASSESSMENT NOTE - NS ED NURSE REASSESS COMMENT FT1
Report received from [facility] RN. Aox2, baseline for pt per previous RN, speaking in complete sentences. Unlabored, spontaneous respirations, NAD, O2 sat 100% on 3L NC. Pt admitted to medicine, telemetry. Awaiting transport upstairs at this time.

## 2020-12-11 NOTE — CONSULT NOTE ADULT - PROBLEM SELECTOR RECOMMENDATION 9
? demand ischemia in setting of fall. No chest pain, would continue ASA, nitrate, beta blocker and trend troponin

## 2020-12-11 NOTE — CONSULT NOTE ADULT - ASSESSMENT
89 year male with HTN hyperlipidemia CAD  + stress test HFpEF, recent hosptial admit with pneumonia and CHF,  now placed in isolation->fall with bruise-> sent to outside hospital again placed in isolation, now post fall with injuury while attempting to shower. Patient also may have had an infarct ,date unknown .  Appears volume overloaded, R > .L effusion

## 2020-12-11 NOTE — H&P ADULT - NSICDXPASTMEDICALHX_GEN_ALL_CORE_FT
PAST MEDICAL HISTORY:  BPH (benign prostatic hyperplasia)     H/O cardiac arrhythmia     Hyperlipidemia     Hypertension     Hypothyroidism (acquired)     Pacemaker

## 2020-12-11 NOTE — CONSULT NOTE ADULT - ASSESSMENT
89 year old man with PMH of HTN, CAD, SSS s.p Pacemaker, hypothyroidism, Afib, R effusion, and HF who presents after fall 2 weeks ago with 5/6/7/8 R rib fx.    Plan:  - agree with continued workup by medicine for possible syncope  - patient fall 2w old, no current complaints, , sat 100%; no intervention required  - R pleural effusion in part chronic  - no need for tertiary survey given chronicity of trauma    Will follow along  Trauma Surgery  [phone] 89 year old man with PMH of HTN, CAD, SSS s.p Pacemaker, hypothyroidism, Afib, R effusion, and HF who presents after fall 2 weeks ago with 5/6/7/8 R rib fx.    Plan:  - agree with continued workup by medicine for possible syncope  - patient fall 2w old, no current complaints, , sat 100%; no intervention required      Will follow along  Trauma Surgery  [phone]

## 2020-12-11 NOTE — ED ADULT NURSE REASSESSMENT NOTE - NS ED NURSE REASSESS COMMENT FT1
Pt resting comfortably, denies pain at this time. Surgery at bedside for consult. Red non-slip socks in place. Provided call bell and lights dimmed after surgery team left for comfort. TBA. Comfort and safety measures maintained.

## 2020-12-11 NOTE — PATIENT PROFILE ADULT - NSPRESCRALCFREQ_GEN_A_NUR
Drinks wine at lunch time and dinner time 2-4 times a month/Drinks wine at lunch time and dinner time

## 2020-12-11 NOTE — H&P ADULT - NSHPSOCIALHISTORY_GEN_ALL_CORE
Social History:    Marital Status:  (   )    (   ) Single    (   )    (x  )   Occupation:   Lives with: (  ) alone  (  ) children   (  ) spouse   (  ) parents  (x  ) other    Substance Use (street drugs): ( x ) never used  (  ) other:  Tobacco Usage:  ( x  ) never smoked   (   ) former smoker   (   ) current smoker  (     ) pack years  (        ) last cigarette date  Alcohol Usage: denies    (     ) Advanced Directives: (     ) None    (      ) DNR    (     ) DNI    (     ) Health Care Proxy:

## 2020-12-11 NOTE — H&P ADULT - ASSESSMENT
89 m with    Syncope?  - telemetry  - cardiac enzymes  - cardiology evqluation Dr. Padgett  - PPM check      CHF cr systolic   - Patient with right pleural effusion  - continue Lasix 40mg   - Cardiology follow    CAD  - medical Rx  - cardiology follow     Hypothyroidism (acquired).  - c/w synthroid.   - Follow TSH    Hypertension.  - control    Hyperlipidemia.   - c/w atorvastatin.    BPH (benign prostatic hyperplasia).   - c/w finasteride.     Leg edema.  - b/l LE edema, likely 2/2 to heart failure  - VA duplex no DVT     H/O cardiac arrhythmia.    - ? hx of arrhythmia s/p PPM  - pacemaker interrogation  - continue Eliquis but need to d/w cardiology the risk of falling and AC   - aspirin.     Prophylactic measure.  - Eliquis.     PT    Further action as per clinical course     Kofi Sanchez MD pager [phone] 89 m with    Syncope?  - telemetry  - cardiac enzymes  - cardiology evqluation Dr. Padgett  - PPM check      CHF cr systolic   - Patient with right pleural effusion  - continue Lasix 40mg   - Cardiology follow    CAD  - medical Rx  - cardiology follow     Hypothyroidism (acquired).  - c/w synthroid.   - Follow TSH    Hypertension.  - control    Hyperlipidemia.   - c/w atorvastatin.    BPH (benign prostatic hyperplasia).   - c/w finasteride.     Leg edema.  - b/l LE edema, likely 2/2 to heart failure  - VA duplex no DVT     H/O cardiac arrhythmia.    - ? hx of arrhythmia s/p PPM  - pacemaker interrogation  - continue Eliquis but need to d/w cardiology the risk of falling and AC   - aspirin.     Rbs fractures  - pain control  - incentive spirometry    Prophylactic measure.  - Eliquis.     PT    Further action as per clinical course     Kofi Sanchez MD pager [phone]

## 2020-12-11 NOTE — PROCEDURE NOTE - ADDITIONAL PROCEDURE DETAILS
Indication for interrogation: Syncope  Measured data WNL, NL PM function, Pt is not PM dependent  Stored data revealed no events  Changes made: none    Patient does have a [place] Atrial lead Tendril ST Optim 1888TC that is turned off due to chronic A Tach.    Beryl EVANS  [phone]

## 2020-12-11 NOTE — H&P ADULT - NSHPPHYSICALEXAM_GEN_ALL_CORE
PHYSICAL EXAMINATION:  Vital Signs Last 24 Hrs  T(C): 36.5 (11 Dec 2020 07:10), Max: 36.6 (10 Dec 2020 23:02)  T(F): 97.7 (11 Dec 2020 07:10), Max: 97.8 (10 Dec 2020 23:02)  HR: 60 (11 Dec 2020 07:10) (60 - 75)  BP: 154/74 (11 Dec 2020 07:10) (135/66 - 154/74)  BP(mean): --  RR: 17 (11 Dec 2020 07:10) (16 - 18)  SpO2: 99% (11 Dec 2020 07:10) (92% - 100%)  CAPILLARY BLOOD GLUCOSE          GENERAL: NAD, well-groomed, well-developed  HEAD:  atraumatic, normocephalic  EYES: sclera anicteric  ENMT: mucous membranes moist  NECK: supple, No JVD  CHEST/LUNG: clear to auscultation bilaterally; no rales, rhonchi, or wheezing b/l  HEART: normal S1, S2  ABDOMEN: BS+, soft, ND, NT   EXTREMITIES:  pulses palpable; no clubbing, cyanosis, or edema b/l LEs  NEURO: awake, confused, interactive; moves all extremities  SKIN: no rashes or lesions

## 2020-12-11 NOTE — CONSULT NOTE ADULT - SUBJECTIVE AND OBJECTIVE BOX
TRAUMA SERVICE (Acute Care Surgery / ACS - #9018) - CONSULT NOTE  --------------------------------------------------------------------------------------------    TRAUMA ACTIVATION LEVEL:     MECHANISM OF INJURY:   [] Blunt    [] MVC	  [x] Fall	  [] Pedestrian Struck	  [] Motorcycle accident   [] Penetrating  [] Gun Shot Wound 		  [] Stab Wound    GCS: 	E: 4	V: 5	M: 6      HPI:   Patient is a 89y old  Male who presents with a chief complaint of fall      HPI:  89 year old man with PMH of HTN, CAD, SSS s.p Pacemaker, hypothyroidism, Afib, R effusion, and HF who presents after fall 2 weeks ago. He reports he does not remember the fall and is unsure if he hit his head or lost consciousness. He did not report any complaints at the time or moving forward for the next two weeks. Yesterday the tech at the home noticed extensive ecchymosis of the right flank and they brought him in. THe patient was recently discharged in November on Eliquis. His extensive cardiac history is concerning for syncope. Patient currently reports no pain, shortness of breath, or pleuritic pain. His IS is 800, and he is saturating 100% on RA.    ROS: 10-system review is otherwise negative except HPI above.      PAST MEDICAL & SURGICAL HISTORY:  H/O cardiac arrhythmia  Hypothyroidism (acquired)  BPH (benign prostatic hyperplasia)  Hyperlipidemia  Hypertension  S/P tonsillectomy  Pacemaker    FAMILY HISTORY:  FH: heart disease  father    [] Family history not pertinent as reviewed with the patient and family    SOCIAL HISTORY:    Patient does not smoke or drink alcohol    ALLERGIES: No Known Allergies      CURRENT MEDICATIONS  MEDICATIONS (STANDING): lactated ringers. 1000 milliLiter(s) IV Continuous <Continuous>    MEDICATIONS (PRN):  --------------------------------------------------------------------------------------------    Vitals:   T(C): 36.4 (20 @ 05:49), Max: 36.6 (12-10-20 @ 23:02)  HR: 60 (20 @ 05:49) (60 - 75)  BP: 151/68 (20 @ 05:49) (135/66 - 151/68)  RR: 16 (20 @ 05:49) (16 - 18)  SpO2: 94% (20 @ 05:49) (92% - 94%)  CAPILLARY BLOOD GLUCOSE        CAPILLARY BLOOD GLUCOSE      Height (cm): 167.6 (12-10 @ 23:02)    PHYSICAL EXAM:   General: NAD  HEENT: Normocephalic, atraumatic, EOMI, PEERLA.  Neck: Soft, midline trachea.  Chest: No chest wall tenderness. Extensive right flank ecchymosis, dark and likely old  Cardiac: S1, S2, RRR  Respiratory: symmetric chest rise without increased WOB  Abdomen: Soft, non-distended, non-tender TTP periumbilically, no rebound,   Pelvis: Stable, non-tender, no ecchymosis  Ext: palp radial b/l UE, b/l DP palp in Lower Extrem.   Back: no TTP, no palpable runoff/stepoff/deformity    --------------------------------------------------------------------------------------------    LABS  CBC ( @ 00:24)                              10.3<L>                         7.82    )----------------(  309        68.0  % Neutrophils, 14.3  % Lymphocytes, ANC: 5.31                                33.2<L>    BMP ( @ 00:24)             134<L>  |  98      |  40<H> 		Ca++ --      Ca 8.9                ---------------------------------( 95    		Mg 2.3                3.9     |  25      |  1.93<H>			Ph 3.6       LFTs ( @ 00:24)      TPro 6.3 / Alb 3.6 / TBili 1.0 / DBili -- / AST 44<H> / ALT 33 / AlkPhos 104            --------------------------------------------------------------------------------------------    MICROBIOLOGY  Urinalysis ( @ 01:04):     Color: Light Yellow / Appearance: Clear / S.018 / pH: 6.0 / Gluc: Negative / Ketones: Negative / Bili: Negative / Urobili: Negative / Protein :Trace<!> / Nitrites: Negative / Leuk.Est: Negative / RBC: 0 / WBC: 1 / Sq Epi:  / Non Sq Epi: 0 / Bacteria 0.0         --------------------------------------------------------------------------------------------    IMAGING  < from: CT Chest w/ IV Cont (20 @ 01:41) >    FINDINGS:  CHEST:  LUNGS AND LARGE AIRWAYS: Patent central airways. Probable right lower lobe compressive atelectasis. Left lower lobe subsegmental atelectasis.  PLEURA: Moderate right and small left pleural effusions.  VESSELS: Atherosclerotic calcification.  HEART: Left chest wall AICD Cardiomegaly. No pericardial effusion. Coronary artery and aortic valve calcification.  MEDIASTINUM AND ANTOINETTE: No lymphadenopathy.  CHEST WALL AND LOWER NECK: Within normal limits.    ABDOMEN AND PELVIS:  LIVER: Mild heterogeneity without definite gross liver injury evident..  BILE DUCTS: Normal caliber.  GALLBLADDER: No calcified gallstone.  SPLEEN: No gross splenic injury evident.  PANCREAS: Within normal limits.  ADRENALS: Within normal limits.  KIDNEYS/URETERS: Kidneys enhance symmetrically without hydronephrosis. Bilateral hypoattenuating foci, too small characterize.    BLADDER: Partially collapsed.  REPRODUCTIVE ORGANS: Enlarged prostate gland.  BOWEL: Evaluation of bowel is limited without distention with oral contrast, however there is no and a small amount of ascites. Colonic diverticulosis without definite acute diverticulitis.. Bowel obstruction. Appendix within normal limits.  PERITONEUM: No ascites.  VESSELS: Infrarenal IVC filter. Calcific atherosclerosis of the abdominal aorta without aneurysmal dilatation or dissection..  RETROPERITONEUM/LYMPH NODES: No lymphadenopathy.  ABDOMINAL WALL: Large left inguinal hernia containing nonobstructed descending colon and sigmoid. Small fat-containing right inguinal hernia.  BONES: Right-sided posterior rib fracture deformities of the right fifth, sixth, seventh, and eighth ribs.      IMPRESSION:    1. Multiple right sided posterior rib fracture deformities as above.  2. Moderate right pleural effusion and probable associated compressive atelectasis right lung base.  3. Small left pleural effusion.  4. Large left inguinal hernia containing large bowel which is nonobstructed/nonstrangulated, and also small amount of ascites.    < end of copied text >      --------------------------------------------------------------------------------------------

## 2020-12-11 NOTE — H&P ADULT - HISTORY OF PRESENT ILLNESS
89yo M pmhx HTN, CAD, SSS S/P pacemaker, afib on AC pw cc of fall States fell two weeks ago and people at nursing home noticed today prompting them to send him to ED. States no pain, feels ok. States does not remember event.

## 2020-12-11 NOTE — ED ADULT NURSE NOTE - NSIMPLEMENTINTERV_GEN_ALL_ED
Implemented All Fall with Harm Risk Interventions:  [place] to call system. Call bell, personal items and telephone within reach. Instruct patient to call for assistance. Room bathroom lighting operational. Non-slip footwear when patient is off stretcher. Physically safe environment: no spills, clutter or unnecessary equipment. Stretcher in lowest position, wheels locked, appropriate side rails in place. Provide visual cue, wrist band, yellow gown, etc. Monitor gait and stability. Monitor for mental status changes and reorient to person, place, and time. Review medications for side effects contributing to fall risk. Reinforce activity limits and safety measures with patient and family. Provide visual clues: red socks.

## 2020-12-11 NOTE — PROGRESS NOTE ADULT - SUBJECTIVE AND OBJECTIVE BOX
Tertiary Trauma Survey (TTS)    Date of TTS:   12/10/20             Time:   Admit Date:   20              Trauma Activation: Consult  Admit GCS: 14 (-1 for confusion)    HPI:  89 year old man with PMH of HTN, CAD, SSS s.p Pacemaker, hypothyroidism, Afib, R effusion, and HF who presents after fall 2 weeks ago. He reports he does not remember the fall and is unsure if he hit his head or lost consciousness. He did not report any complaints at the time or moving forward for the next two weeks. Yesterday the tech at the home noticed extensive ecchymosis of the right flank and they brought him in. THe patient was recently discharged in November on Eliquis. His extensive cardiac history is concerning for syncope. Patient currently reports no pain, shortness of breath, or pleuritic pain. His IS is 800, and he is saturating 100% on RA.    General: NAD  HEENT: Normocephalic, atraumatic, EOMI, PEERLA.  Neck: Soft, midline trachea.  Chest: No chest wall tenderness. Extensive right flank ecchymosis, dark and likely old  Cardiac: S1, S2, RRR  Respiratory: symmetric chest rise without increased WOB  Abdomen: Soft, non-distended, non-tender TTP periumbilically, no rebound,   Pelvis: Stable, non-tender, no ecchymosis  Ext: palp radial b/l UE, b/l DP palp in Lower Extrem.   Back: no TTP, no palpable runoff/stepoff/deformity    Tertiary:  General: alert and oriented, NAD  Resp: airway patent, respirations unlabored  Chest: large ecchymosis over left flank and chest, nontender, able to pull 1L on IS  CVS: regular rate and rhythm  Abdomen: soft, nontender, nondistended  Extremities: no edema  Skin: warm, dry, appropriate color      PAST MEDICAL & SURGICAL HISTORY:  Pacemaker  H/O cardiac arrhythmia  Hypothyroidism (acquired)  BPH (benign prostatic hyperplasia)  Hyperlipidemia  Hypertension  S/P tonsillectomy  Pacemaker      [  ] No significant past history as reviewed with the patient and family    FAMILY HISTORY:  FH: heart disease  father      [  ] Family history not pertinent as reviewed with the patient and family    SOCIAL HISTORY:    Medications (inpatient): apixaban 2.5 milliGRAM(s) Oral two times a day  aspirin enteric coated 81 milliGRAM(s) Oral daily  atorvastatin 20 milliGRAM(s) Oral at bedtime  finasteride 5 milliGRAM(s) Oral daily  furosemide    Tablet 40 milliGRAM(s) Oral daily  isosorbide   mononitrate ER Tablet (IMDUR) 60 milliGRAM(s) Oral daily  levothyroxine 50 MICROGram(s) Oral daily  metoprolol succinate  milliGRAM(s) Oral daily    Medications (PRN):acetaminophen   Tablet .. 650 milliGRAM(s) Oral every 6 hours PRN    Allergies: No Known Allergies  (Intolerances: )    Vital Signs Last 24 Hrs  T(C): 36.4 (11 Dec 2020 10:11), Max: 36.6 (10 Dec 2020 23:02)  T(F): 97.5 (11 Dec 2020 10:), Max: 97.8 (10 Dec 2020 23:)  HR: 60 (11 Dec 2020 10:) (60 - 75)  BP: 150/73 (11 Dec 2020 10:11) (135/66 - 154/74)  BP(mean): --  RR: 18 (11 Dec 2020 10:) (16 - 18)  SpO2: 94% (11 Dec 2020 10:) (92% - 100%)  Drug Dosing Weight  Height (cm): 167.6 (10 Dec 2020 23:02)  Weight (kg): 56.7 (2020 05:56)  BMI (kg/m2): 20.2 (10 Dec 2020 23:02)  BSA (m2): 1.64 (10 Dec 2020 23:02)                          10.3   7.82  )-----------( 309      ( 11 Dec 2020 00:24 )             33.2     12-    134<L>  |  98  |  40<H>  ----------------------------<  95  3.9   |  25  |  1.93<H>    Ca    8.9      11 Dec 2020 00:24  Phos  3.6     12-11  Mg     2.3     12-    TPro  6.3  /  Alb  3.6  /  TBili  1.0  /  DBili  x   /  AST  44<H>  /  ALT  33  /  AlkPhos  104  12-11      Urinalysis Basic - ( 11 Dec 2020 01:04 )    Color: Light Yellow / Appearance: Clear / S.018 / pH: x  Gluc: x / Ketone: Negative  / Bili: Negative / Urobili: Negative   Blood: x / Protein: Trace / Nitrite: Negative   Leuk Esterase: Negative / RBC: 0 /hpf / WBC 1 /HPF   Sq Epi: x / Non Sq Epi: 0 /hpf / Bacteria: 0.0        List Injuries Identified to Date:    List Operative and Interventional Radiological Procedures:     Consults (Date):  [  ] Neurosurgery   [  ] Orthopedics  [  ] Plastics  [  ] Urology  [  ] PM&R  [  ] Social Work    RADIOLOGICAL FINDINGS REVIEW:  Head CT:  < from: CT Head No Cont (20 @ 01:36) >  FINDINGS:    No CT evidence of acute intracranial hemorrhage, extra-axial collection, edema, mass effect, midline shift, central herniation, or hydrocephalus. Grey-white matter junction is well-preserved.    Age-related cerebral volume loss and patchy white matter hypoattenuation which is nonspecific in etiology but likely related to chronic microvascular ischemic disease.    Visualized paranasal sinuses are well-aerated. Partial opacification of right mastoid air cells. No acute calvarial fracture.    IMPRESSION:    No CT evidence of acute hemorrhage or acute calvarial fracture.    Nonspecific partial opacification of right mastoid air cells.    < end of copied text >    C-Spine CT:  Neck CT:  Chest CT:  c< from: CT Chest w/ IV Cont (20 @ 01:41) >  FINDINGS:  CHEST:  LUNGS AND LARGE AIRWAYS: Patent central airways. Probable right lower lobe compressive atelectasis. Left lower lobe subsegmental atelectasis.  PLEURA: Moderate right and small left pleural effusions.  VESSELS: Atherosclerotic calcification.  HEART: Left chest wall AICD Cardiomegaly. No pericardial effusion. Coronary artery and aortic valve calcification.  MEDIASTINUM AND ANTOINETTE: No lymphadenopathy.  CHEST WALL AND LOWER NECK: Within normal limits.    ABDOMEN AND PELVIS:  LIVER: Mild heterogeneity without definite gross liver injury evident..  BILE DUCTS: Normal caliber.  GALLBLADDER: No calcified gallstone.  SPLEEN: No gross splenic injury evident.  PANCREAS: Within normal limits.  ADRENALS: Within normal limits.  KIDNEYS/URETERS: Kidneys enhance symmetrically without hydronephrosis. Bilateral hypoattenuating foci, too small characterize.    BLADDER: Partially collapsed.  REPRODUCTIVE ORGANS: Enlarged prostate gland.  BOWEL: Evaluation of bowel is limited without distention with oral contrast, however there is no and a small amount of ascites. Colonic diverticulosis without definite acute diverticulitis.. Bowel obstruction. Appendix within normal limits.  PERITONEUM: No ascites.  VESSELS: Infrarenal IVC filter. Calcific atherosclerosis of the abdominal aorta without aneurysmal dilatation or dissection..  RETROPERITONEUM/LYMPH NODES: No lymphadenopathy.  ABDOMINAL WALL: Large left inguinal hernia containing nonobstructed descending colon and sigmoid. Small fat-containing right inguinal hernia.  BONES: Right-sided posterior rib fracture deformities of the right fifth, sixth, seventh, and eighth ribs.      IMPRESSION:    1. Multiple right sided posterior rib fracture deformities as above.  2. Moderate right pleural effusion and probable associated compressive atelectasis right lung base.  3. Small left pleural effusion.  4. Large left inguinal hernia containing large bowel which is nonobstructed/nonstrangulated, and also small amount of ascites.    < end of copied text >    ABD/Pelvis CT:  Other:    Interpretation of Findings:

## 2020-12-11 NOTE — ED ADULT NURSE NOTE - NSFALLRSKUNASSIST_ED_ALL_ED
[facility] DEPERE  [facility]  [address] [address]  [phone]    1/11/2019    Johana Reyes  [address] [place] [id number]      Dear Johana,     I hope you are doing well.    It has been sometime since I have heard from you and I want to reach out to see if you need additional services. Please contact me at [phone] if you would like to schedule another appointment.    If I do not hear from you by 2/11/19 (30 days from the date of this letter), I will assume you no longer desire my services and this episode of care will be closed. However, you may call me at any time and we will be happy to see you again.    You may also choose another provider in the community. We have included a list of other possible resources.    [facility] [phone], [facility] [phone] and [facility] [phone]    You also have the right to have this discharge reviewed prior to 2/11/19 (the discharge date listed above) by [facility] Certification Section, Division of , [address], [place] [address], Phone [phone].     If you have questions or concerns with this letter, please contact me directly to discuss at [phone].    Again, we would welcome the chance to work with you again at any point in the future.      Take Care,        Marylu Angel LCSW   no

## 2020-12-11 NOTE — CONSULT NOTE ADULT - PROBLEM SELECTOR RECOMMENDATION 2
continue eliquis for now , PMR assessment of gait stability,. Social situation of adult home requiring a 14 day isolation upon arrival may not be a tenable discharge plan.

## 2020-12-11 NOTE — CONSULT NOTE ADULT - SUBJECTIVE AND OBJECTIVE BOX
CHIEF COMPLAINT: fall, unsteady gait      PAST MEDICAL & SURGICAL HISTORY:  Pacemaker    H/O cardiac arrhythmia    Hypothyroidism (acquired)    BPH (benign prostatic hyperplasia)    Hyperlipidemia    Hypertension    S/P tonsillectomy    Pacemaker        HPI: 89 year male with hypertension, hyperlipidemia, sick sinus syndrome, post PPM, recent development of sustained AFIB,  h/o HF p EF, admit post fall.     He has known CAD, and recently was admitted 2020  with CHF.  echo EF normal moderate LVH borderline troponin, stress test markedly abnormal, due to advanced age and renal insufficiency, the patient was managed medically. Sent to adult home, where he was placed  alone in quarantine for 14 days and fell and hit head. The patient was brought to an outside hospital and  head CT was negative and Eliquis was resumed. The patient , again having been to a hospital ,was again placed alone  in quarantine for 14 days, and this time , he fell while attempting to shower .  Nurse went to see patient yesterday. patient did not tell anyone about fall, unclear timing ,. now found to have an elevated troponin CPK and CPK MB, chest pain free. PPM interrogated well functioning [facility] device no events. CT chest with R.L effusion, fractures of rR ribs.                 PREVIOUS DIAGNOSTIC TESTING:      ECHO  FINDINGS:    STRESS  FINDINGS:    CATHETERIZATION  FINDINGS:    ELECTROPHYSIOLOGY STUDY  FINDINGS:    CAROTID ULTRASOUND:  FINDINGS    VENOUS DUPLEX SCAN:  FINDINGS:    CHEST CT PULMONARY ANGIO with IV Contrast:  FINDINGS:  MEDICATIONS  (STANDING):  apixaban 2.5 milliGRAM(s) Oral two times a day  aspirin enteric coated 81 milliGRAM(s) Oral daily  atorvastatin 20 milliGRAM(s) Oral at bedtime  finasteride 5 milliGRAM(s) Oral daily  isosorbide   mononitrate ER Tablet (IMDUR) 60 milliGRAM(s) Oral daily  levothyroxine 50 MICROGram(s) Oral daily  metoprolol succinate  milliGRAM(s) Oral daily    MEDICATIONS  (PRN):  acetaminophen   Tablet .. 650 milliGRAM(s) Oral every 6 hours PRN Temp greater or equal to 38.5C (101.3F), Mild Pain (1 - 3)      FAMILY HISTORY:  FH: heart disease  father        SOCIAL HISTORY:    CIGARETTES:    ALCOHOL:    REVIEW OF SYSTEMS:    CONSTITUTIONAL: No fever, weight loss, chills, shakes, or fatigue  EYES: No eye pain, visual disturbances, or discharge  ENMT:  No difficulty hearing, tinnitus, vertigo; No sinus or throat pain  NECK: No pain or stiffness  BREASTS: No pain, masses, or nipple discharge  RESPIRATORY: No cough, wheezing, hemoptysis, or shortness of breath  CARDIOVASCULAR: No chest pain, dyspnea, palpitations, dizziness, syncope, paroxysmal nocturnal dyspnea, orthopnea, or arm or leg swelling  GASTROINTESTINAL: No abdominal  or epigastric pain, nausea, vomiting, hematemesis, diarrhea, constipation, melena or bright red blood.  GENITOURINARY: No dysuria, nocturia, hematuria, or urinary incontinence  NEUROLOGICAL: No headaches, memory loss, slurred speech, limb weakness, loss of strength, numbness, or tremors  SKIN: No itching, burning, rashes, or lesions   LYMPH NODES: No enlarged glands  ENDOCRINE: No heat or cold intolerance, or hair loss  MUSCULOSKELETAL: No joint pain or swelling, muscle, back, or extremity pain  PSYCHIATRIC: No depression, anxiety, or difficulty sleeping  HEME/LYMPH: No easy bruising or bleeding gums  ALLERY AND IMMUNOLOGIC: No hives or rash.      Vital Signs Last 24 Hrs  T(C): 36.4 (11 Dec 2020 11:50), Max: 36.6 (10 Dec 2020 23:02)  T(F): 97.6 (11 Dec 2020 11:50), Max: 97.8 (10 Dec 2020 23:02)  HR: 60 (11 Dec 2020 11:50) (60 - 75)  BP: 150/73 (11 Dec 2020 10:11) (135/66 - 154/74)  BP(mean): --  RR: 18 (11 Dec 2020 10:) (16 - 18)  SpO2: 94% (11 Dec 2020 10:) (92% - 100%)  Daily Height in cm: 167.64 (10 Dec 2020 23:02)    Daily      @ 07:01  -   @ 17:05  --------------------------------------------------------  IN: 180 mL / OUT: 0 mL / NET: 180 mL          PHYSICAL EXAM:    GENERAL: In no apparent distress, well nourished, and hydrated. large bruise and scrape on the back and thorax  HEAD:  Atraumatic, Normocephalic  EYES: EOMI, PERRLA, conjunctiva and sclera clear  ENMT: No tonsillar erythema, exudates, or enlargement; Moist mucous membranes, Good dentition, No lesions  NECK: Supple and normal thyroid.  No JVD or carotid bruit.  Carotid pulse is 2+ bilaterally.  HEART: irregularly ireregular hm; No murmurs, rubs, or gallops.  PULMONARY: Clear to auscultation and perfusion.  No rales, wheezing, or rhonchi bilaterally.  ABDOMEN: Soft, Nontender, Nondistended; Bowel sounds present  EXTREMITIES:  2+ Peripheral Pulses, No clubbing, cyanosis, or edema  LYMPH: No lymphadenopathy noted  NEUROLOGICAL: Grossly nonfocal          INTERPRETATION OF TELEMETRY:    ECG:    I&O's Detail    11 Dec 2020 07:01  -  11 Dec 2020 17:05  --------------------------------------------------------  IN:    Oral Fluid: 180 mL  Total IN: 180 mL    OUT:  Total OUT: 0 mL    Total NET: 180 mL          LABS:                        10.3   7.82  )-----------( 309      ( 11 Dec 2020 00:24 )             33.2     12-11    135  |  97  |  34<H>  ----------------------------<  143<H>  4.0   |  26  |  1.76<H>    Ca    9.0      11 Dec 2020 10:15  Phos  3.6     12-11  Mg     2.3     12-11    TPro  6.7  /  Alb  4.1  /  TBili  1.3<H>  /  DBili  x   /  AST  45<H>  /  ALT  35  /  AlkPhos  110  12-11    CARDIAC MARKERS ( 11 Dec 2020 10:15 )  x     / x     / 280 U/L / x     / 10.0 ng/mL        Urinalysis Basic - ( 11 Dec 2020 01:04 )    Color: Light Yellow / Appearance: Clear / S.018 / pH: x  Gluc: x / Ketone: Negative  / Bili: Negative / Urobili: Negative   Blood: x / Protein: Trace / Nitrite: Negative   Leuk Esterase: Negative / RBC: 0 /hpf / WBC 1 /HPF   Sq Epi: x / Non Sq Epi: 0 /hpf / Bacteria: 0.0      BNP  I&O's Detail    11 Dec 2020 07:01  -  11 Dec 2020 17:05  --------------------------------------------------------  IN:    Oral Fluid: 180 mL  Total IN: 180 mL    OUT:  Total OUT: 0 mL    Total NET: 180 mL        Daily Height in cm: 167.64 (10 Dec 2020 23:02)    Daily     RADIOLOGY & ADDITIONAL STUDIES:

## 2020-12-11 NOTE — H&P ADULT - NSHPLABSRESULTS_GEN_ALL_CORE
10.3   7.82  )-----------( 309      ( 11 Dec 2020 00:24 )             33.2       12-11    134<L>  |  98  |  40<H>  ----------------------------<  95  3.9   |  25  |  1.93<H>    Ca    8.9      11 Dec 2020 00:24  Phos  3.6     12-11  Mg     2.3     12-11    TPro  6.3  /  Alb  3.6  /  TBili  1.0  /  DBili  x   /  AST  44<H>  /  ALT  33  /  AlkPhos  104  12-11              Urinalysis Basic - ( 11 Dec 2020 01:04 )    Color: Light Yellow / Appearance: Clear / S.018 / pH: x  Gluc: x / Ketone: Negative  / Bili: Negative / Urobili: Negative   Blood: x / Protein: Trace / Nitrite: Negative   Leuk Esterase: Negative / RBC: 0 /hpf / WBC 1 /HPF   Sq Epi: x / Non Sq Epi: 0 /hpf / Bacteria: 0.0    < from: CT Abdomen and Pelvis w/ IV Cont (20 @ 01:41) >    IMPRESSION:    1. Multiple right sided posterior rib fracture deformities as above.  2. Moderate right pleural effusion and probable associated compressive atelectasis right lung base.  3. Small left pleural effusion.  4. Large left inguinal hernia containing large bowel which is nonobstructed/nonstrangulated, and also small noam    < end of copied text >    < from: CT Head No Cont (20 @ 01:36) >    IMPRESSION:    No CT evidence of acute hemorrhage or acute calvarial fracture.    Nonspecific partial opacification of right mastoid air cells.    < end of copied text >    EKG Paced

## 2020-12-12 LAB
ALBUMIN SERPL ELPH-MCNC: 3.4 G/DL — SIGNIFICANT CHANGE UP (ref 3.3–5)
ALP SERPL-CCNC: 104 U/L — SIGNIFICANT CHANGE UP (ref 40–120)
ALT FLD-CCNC: 34 U/L — SIGNIFICANT CHANGE UP (ref 10–45)
ANION GAP SERPL CALC-SCNC: 13 MMOL/L — SIGNIFICANT CHANGE UP (ref 5–17)
APPEARANCE UR: ABNORMAL
AST SERPL-CCNC: 49 U/L — HIGH (ref 10–40)
BACTERIA # UR AUTO: NEGATIVE — SIGNIFICANT CHANGE UP
BILIRUB DIRECT SERPL-MCNC: 0.2 MG/DL — SIGNIFICANT CHANGE UP (ref 0–0.2)
BILIRUB INDIRECT FLD-MCNC: 1 MG/DL — SIGNIFICANT CHANGE UP (ref 0.2–1)
BILIRUB SERPL-MCNC: 1.2 MG/DL — SIGNIFICANT CHANGE UP (ref 0.2–1.2)
BILIRUB UR-MCNC: NEGATIVE — SIGNIFICANT CHANGE UP
BUN SERPL-MCNC: 38 MG/DL — HIGH (ref 7–23)
CALCIUM SERPL-MCNC: 9 MG/DL — SIGNIFICANT CHANGE UP (ref 8.4–10.5)
CHLORIDE SERPL-SCNC: 99 MMOL/L — SIGNIFICANT CHANGE UP (ref 96–108)
CK MB BLD-MCNC: 3.8 % — HIGH (ref 0–3.5)
CK MB CFR SERPL CALC: 10.4 NG/ML — HIGH (ref 0–6.7)
CK SERPL-CCNC: 275 U/L — HIGH (ref 30–200)
CO2 SERPL-SCNC: 24 MMOL/L — SIGNIFICANT CHANGE UP (ref 22–31)
COLOR SPEC: ABNORMAL
CREAT SERPL-MCNC: 1.69 MG/DL — HIGH (ref 0.5–1.3)
DIFF PNL FLD: ABNORMAL
EPI CELLS # UR: 0 /HPF — SIGNIFICANT CHANGE UP
GLUCOSE SERPL-MCNC: 81 MG/DL — SIGNIFICANT CHANGE UP (ref 70–99)
GLUCOSE UR QL: NEGATIVE — SIGNIFICANT CHANGE UP
HCT VFR BLD CALC: 37.1 % — LOW (ref 39–50)
HGB BLD-MCNC: 11.8 G/DL — LOW (ref 13–17)
HYALINE CASTS # UR AUTO: 1 /LPF — SIGNIFICANT CHANGE UP (ref 0–2)
KETONES UR-MCNC: NEGATIVE — SIGNIFICANT CHANGE UP
LEUKOCYTE ESTERASE UR-ACNC: NEGATIVE — SIGNIFICANT CHANGE UP
MCHC RBC-ENTMCNC: 28.3 PG — SIGNIFICANT CHANGE UP (ref 27–34)
MCHC RBC-ENTMCNC: 31.8 GM/DL — LOW (ref 32–36)
MCV RBC AUTO: 89 FL — SIGNIFICANT CHANGE UP (ref 80–100)
NITRITE UR-MCNC: NEGATIVE — SIGNIFICANT CHANGE UP
NRBC # BLD: 0 /100 WBCS — SIGNIFICANT CHANGE UP (ref 0–0)
PH UR: 6 — SIGNIFICANT CHANGE UP (ref 5–8)
PLATELET # BLD AUTO: 342 K/UL — SIGNIFICANT CHANGE UP (ref 150–400)
POTASSIUM SERPL-MCNC: 4.2 MMOL/L — SIGNIFICANT CHANGE UP (ref 3.5–5.3)
POTASSIUM SERPL-SCNC: 4.2 MMOL/L — SIGNIFICANT CHANGE UP (ref 3.5–5.3)
PROT SERPL-MCNC: 6.6 G/DL — SIGNIFICANT CHANGE UP (ref 6–8.3)
PROT UR-MCNC: ABNORMAL
RBC # BLD: 4.17 M/UL — LOW (ref 4.2–5.8)
RBC # FLD: 16.6 % — HIGH (ref 10.3–14.5)
RBC CASTS # UR COMP ASSIST: 2120 /HPF — HIGH (ref 0–4)
SARS-COV-2 IGG SERPL QL IA: NEGATIVE — SIGNIFICANT CHANGE UP
SARS-COV-2 IGM SERPL IA-ACNC: 0.07 INDEX — SIGNIFICANT CHANGE UP
SODIUM SERPL-SCNC: 136 MMOL/L — SIGNIFICANT CHANGE UP (ref 135–145)
SP GR SPEC: 1.02 — SIGNIFICANT CHANGE UP (ref 1.01–1.02)
TROPONIN T, HIGH SENSITIVITY RESULT: 99 NG/L — HIGH (ref 0–51)
UROBILINOGEN FLD QL: NEGATIVE — SIGNIFICANT CHANGE UP
WBC # BLD: 9.23 K/UL — SIGNIFICANT CHANGE UP (ref 3.8–10.5)
WBC # FLD AUTO: 9.23 K/UL — SIGNIFICANT CHANGE UP (ref 3.8–10.5)
WBC UR QL: 6 /HPF — HIGH (ref 0–5)

## 2020-12-12 RX ORDER — ISOSORBIDE MONONITRATE 60 MG/1
120 TABLET, EXTENDED RELEASE ORAL DAILY
Refills: 0 | Status: DISCONTINUED | OUTPATIENT
Start: 2020-12-13 | End: 2020-12-15

## 2020-12-12 RX ADMIN — APIXABAN 2.5 MILLIGRAM(S): 2.5 TABLET, FILM COATED ORAL at 17:48

## 2020-12-12 RX ADMIN — Medication 81 MILLIGRAM(S): at 10:29

## 2020-12-12 RX ADMIN — Medication 50 MICROGRAM(S): at 06:15

## 2020-12-12 RX ADMIN — ISOSORBIDE MONONITRATE 60 MILLIGRAM(S): 60 TABLET, EXTENDED RELEASE ORAL at 10:29

## 2020-12-12 RX ADMIN — Medication 100 MILLIGRAM(S): at 06:15

## 2020-12-12 RX ADMIN — ATORVASTATIN CALCIUM 20 MILLIGRAM(S): 80 TABLET, FILM COATED ORAL at 21:27

## 2020-12-12 RX ADMIN — APIXABAN 2.5 MILLIGRAM(S): 2.5 TABLET, FILM COATED ORAL at 06:15

## 2020-12-12 RX ADMIN — FINASTERIDE 5 MILLIGRAM(S): 5 TABLET, FILM COATED ORAL at 10:29

## 2020-12-12 RX ADMIN — Medication 40 MILLIGRAM(S): at 06:15

## 2020-12-12 NOTE — PHYSICAL THERAPY INITIAL EVALUATION ADULT - CRITERIA FOR SKILLED THERAPEUTIC INTERVENTIONS
impairments found/risk reduction/prevention/rehab potential/anticipated equipment needs at discharge/anticipated discharge recommendation/functional limitations in following categories/predicted duration of therapy intervention/therapy frequency

## 2020-12-12 NOTE — PHYSICAL THERAPY INITIAL EVALUATION ADULT - PERTINENT HX OF CURRENT PROBLEM, REHAB EVAL
89 year old man with PMH of HTN, CAD, SSS s.p Pacemaker, hypothyroidism, Afib, R effusion, and HF who presents after fall 2 weeks ago with 5/6/7/8 R rib fx. 12/11 Head CT: neg.

## 2020-12-12 NOTE — PROGRESS NOTE ADULT - SUBJECTIVE AND OBJECTIVE BOX
INTERVAL HPI/OVERNIGHT EVENTS: patient feels well, no chest pain. unsteady gaitr    MEDICATIONS  (STANDING):  apixaban 2.5 milliGRAM(s) Oral two times a day  aspirin enteric coated 81 milliGRAM(s) Oral daily  atorvastatin 20 milliGRAM(s) Oral at bedtime  finasteride 5 milliGRAM(s) Oral daily  furosemide   Injectable 40 milliGRAM(s) IV Push daily  levothyroxine 50 MICROGram(s) Oral daily  metoprolol succinate  milliGRAM(s) Oral daily    MEDICATIONS  (PRN):  acetaminophen   Tablet .. 650 milliGRAM(s) Oral every 6 hours PRN Temp greater or equal to 38.5C (101.3F), Mild Pain (1 - 3)      Allergies    No Known Allergies    Intolerances      ROS:  General: Pt denies recent weight loss/fever/chills    Neurological: denies numbness or  sensation loss    Cardiovascular: denies chest pain/palpitations/leg edema    Respiratory and Thorax: denies SOB/cough/wheezing    Gastrointestinal: denies abdominal pain/diarrhea/constipation/bloody stool    Genitourinary: denies urinary frequency/urgency/ dysuria    Musculoskeletal: denies joint pain or swelling, denies restricted motion    Hematologic: denies abnormal bleeding  	    	  	    		        	    	            Vital Signs Last 24 Hrs  T(C): 36.3 (12 Dec 2020 12:51), Max: 36.3 (12 Dec 2020 04:58)  T(F): 97.3 (12 Dec 2020 12:51), Max: 97.4 (12 Dec 2020 04:58)  HR: 60 (12 Dec 2020 12:51) (59 - 60)  BP: 138/76 (12 Dec 2020 12:51) (138/76 - 140/91)  BP(mean): --  RR: 18 (12 Dec 2020 12:51) (18 - 18)  SpO2: 92% (12 Dec 2020 12:51) (92% - 95%)  Daily     Daily      @ 07:  -   @ 07:00  --------------------------------------------------------  IN: 420 mL / OUT: 400 mL / NET: 20 mL     @ 07:  -   @ 19:51  --------------------------------------------------------  IN: 720 mL / OUT: 1450 mL / NET: -730 mL      Physical Exam: wdwn male  noJVD  cor RR  lung clear  abd sogt   ext no edema      LABS:                        11.8   9.23  )-----------( 342      ( 12 Dec 2020 07:25 )             37.1         136  |  99  |  38<H>  ----------------------------<  81  4.2   |  24  |  1.69<H>    Ca    9.0      12 Dec 2020 07:25  Phos  3.6       Mg     2.3         TPro  6.6  /  Alb  3.4  /  TBili  1.2  /  DBili  0.2  /  AST  49<H>  /  ALT  34  /  AlkPhos  104  12-12      Urinalysis Basic - ( 11 Dec 2020 01:04 )    Color: Light Yellow / Appearance: Clear / S.018 / pH: x  Gluc: x / Ketone: Negative  / Bili: Negative / Urobili: Negative   Blood: x / Protein: Trace / Nitrite: Negative   Leuk Esterase: Negative / RBC: 0 /hpf / WBC 1 /HPF   Sq Epi: x / Non Sq Epi: 0 /hpf / Bacteria: 0.0        RADIOLOGY & ADDITIONAL TESTS:    TELE:    EKG:

## 2020-12-12 NOTE — PROGRESS NOTE ADULT - SUBJECTIVE AND OBJECTIVE BOX
Patient is a 89y old  Male who presents with a chief complaint of     SUBJECTIVE / OVERNIGHT EVENTS: Comfortable without new complaints.   Review of Systems  chest pain no  palpitations no  sob no  nausea no  headache no    MEDICATIONS  (STANDING):  apixaban 2.5 milliGRAM(s) Oral two times a day  aspirin enteric coated 81 milliGRAM(s) Oral daily  atorvastatin 20 milliGRAM(s) Oral at bedtime  finasteride 5 milliGRAM(s) Oral daily  furosemide   Injectable 40 milliGRAM(s) IV Push daily  isosorbide   mononitrate ER Tablet (IMDUR) 60 milliGRAM(s) Oral daily  levothyroxine 50 MICROGram(s) Oral daily  metoprolol succinate  milliGRAM(s) Oral daily    MEDICATIONS  (PRN):  acetaminophen   Tablet .. 650 milliGRAM(s) Oral every 6 hours PRN Temp greater or equal to 38.5C (101.3F), Mild Pain (1 - 3)      Vital Signs Last 24 Hrs  T(C): 36.3 (12 Dec 2020 12:51), Max: 36.3 (12 Dec 2020 04:58)  T(F): 97.3 (12 Dec 2020 12:51), Max: 97.4 (12 Dec 2020 04:58)  HR: 60 (12 Dec 2020 12:51) (59 - 60)  BP: 138/76 (12 Dec 2020 12:51) (138/76 - 140/91)  BP(mean): --  RR: 18 (12 Dec 2020 12:51) (18 - 18)  SpO2: 92% (12 Dec 2020 12:51) (92% - 95%)    PHYSICAL EXAM:  GENERAL: NAD, well-developed  HEAD:  Atraumatic, Normocephalic  EYES: EOMI, PERRLA, conjunctiva and sclera clear  NECK: Supple, No JVD  CHEST/LUNG: Clear to auscultation bilaterally; No wheeze   HEART: Regular rate and rhythm; No murmurs, rubs, or gallops   ABDOMEN: Soft, Nontender, Nondistended; Bowel sounds present  EXTREMITIES:  2+ Peripheral Pulses, No clubbing, cyanosis, or edema  PSYCH: AAOx3  NEUROLOGY: non-focal  SKIN: No rashes or lesions    LABS:                        11.8   9.23  )-----------( 342      ( 12 Dec 2020 07:25 )             37.1     12-    136  |  99  |  38<H>  ----------------------------<  81  4.2   |  24  |  1.69<H>    Ca    9.0      12 Dec 2020 07:25  Phos  3.6     12-11  Mg     2.3     12-11    TPro  6.6  /  Alb  3.4  /  TBili  1.2  /  DBili  0.2  /  AST  49<H>  /  ALT  34  /  AlkPhos  104  1212      CARDIAC MARKERS ( 12 Dec 2020 07:25 )  x     / x     / 275 U/L / x     / 10.4 ng/mL  CARDIAC MARKERS ( 11 Dec 2020 18:11 )  x     / x     / 287 U/L / x     / 10.9 ng/mL  CARDIAC MARKERS ( 11 Dec 2020 10:15 )  x     / x     / 280 U/L / x     / 10.0 ng/mL      Urinalysis Basic - ( 11 Dec 2020 01:04 )    Color: Light Yellow / Appearance: Clear / S.018 / pH: x  Gluc: x / Ketone: Negative  / Bili: Negative / Urobili: Negative   Blood: x / Protein: Trace / Nitrite: Negative   Leuk Esterase: Negative / RBC: 0 /hpf / WBC 1 /HPF   Sq Epi: x / Non Sq Epi: 0 /hpf / Bacteria: 0.0        Culture - Urine (collected 11 Dec 2020 02:22)  Source: .Urine Clean Catch (Midstream)  Final Report (11 Dec 2020 23:06):    <10,000 CFU/mL Normal Urogenital Mimi        RADIOLOGY & ADDITIONAL TESTS:    Imaging Personally Reviewed:    Consultant(s) Notes Reviewed:      Care Discussed with Consultants/Other Providers:

## 2020-12-12 NOTE — PROGRESS NOTE ADULT - ASSESSMENT
89 year male with HTN hyperlipidemia CAD  + stress test HFpEF, recent hosptial admit with pneumonia and CHF,  now placed in isolation->fall with bruise-> sent to outside hospital again placed in isolation, now post fall with injuury while attempting to shower. Patient also may have had an infarct ,date unknown .    patient improved post furosemide IV

## 2020-12-12 NOTE — PROGRESS NOTE ADULT - ASSESSMENT
89 m with    Syncope  - telemetry  - cardiac enzymes noted  - cardiology follow Dr. Padgett  - PPM check      CHF cr systolic   - Patient with right pleural effusion  - continue Lasix 40mg   - Cardiology follow    CAD  - medical Rx  - cardiology follow     Hypothyroidism (acquired).  - c/w synthroid.   - Follow TSH    Hypertension.  - control    Hyperlipidemia.   - c/w atorvastatin.    BPH (benign prostatic hyperplasia).   - c/w finasteride.     Leg edema.  - b/l LE edema, likely 2/2 to heart failure  - VA duplex no DVT     H/O cardiac arrhythmia.    - ? hx of arrhythmia s/p PPM  - pacemaker interrogation  - continue Eliquis but need to d/w cardiology the risk of falling and AC   - aspirin.     Rbs fractures  - pain control  - incentive spirometry    Prophylactic measure.  - Eliquis.     PT    Kofi Sanchez MD pager [phone]

## 2020-12-13 LAB
ALBUMIN SERPL ELPH-MCNC: 3.3 G/DL — SIGNIFICANT CHANGE UP (ref 3.3–5)
ALP SERPL-CCNC: 99 U/L — SIGNIFICANT CHANGE UP (ref 40–120)
ALT FLD-CCNC: 28 U/L — SIGNIFICANT CHANGE UP (ref 10–45)
ANION GAP SERPL CALC-SCNC: 15 MMOL/L — SIGNIFICANT CHANGE UP (ref 5–17)
AST SERPL-CCNC: 46 U/L — HIGH (ref 10–40)
BILIRUB SERPL-MCNC: 1.1 MG/DL — SIGNIFICANT CHANGE UP (ref 0.2–1.2)
BUN SERPL-MCNC: 38 MG/DL — HIGH (ref 7–23)
CALCIUM SERPL-MCNC: 8.8 MG/DL — SIGNIFICANT CHANGE UP (ref 8.4–10.5)
CHLORIDE SERPL-SCNC: 99 MMOL/L — SIGNIFICANT CHANGE UP (ref 96–108)
CO2 SERPL-SCNC: 22 MMOL/L — SIGNIFICANT CHANGE UP (ref 22–31)
CREAT SERPL-MCNC: 1.83 MG/DL — HIGH (ref 0.5–1.3)
GLUCOSE SERPL-MCNC: 81 MG/DL — SIGNIFICANT CHANGE UP (ref 70–99)
HCT VFR BLD CALC: 38.2 % — LOW (ref 39–50)
HGB BLD-MCNC: 11.8 G/DL — LOW (ref 13–17)
MCHC RBC-ENTMCNC: 27.8 PG — SIGNIFICANT CHANGE UP (ref 27–34)
MCHC RBC-ENTMCNC: 30.9 GM/DL — LOW (ref 32–36)
MCV RBC AUTO: 89.9 FL — SIGNIFICANT CHANGE UP (ref 80–100)
NRBC # BLD: 0 /100 WBCS — SIGNIFICANT CHANGE UP (ref 0–0)
PLATELET # BLD AUTO: 346 K/UL — SIGNIFICANT CHANGE UP (ref 150–400)
POTASSIUM SERPL-MCNC: 4.2 MMOL/L — SIGNIFICANT CHANGE UP (ref 3.5–5.3)
POTASSIUM SERPL-SCNC: 4.2 MMOL/L — SIGNIFICANT CHANGE UP (ref 3.5–5.3)
PROT SERPL-MCNC: 6.2 G/DL — SIGNIFICANT CHANGE UP (ref 6–8.3)
RBC # BLD: 4.25 M/UL — SIGNIFICANT CHANGE UP (ref 4.2–5.8)
RBC # FLD: 16.8 % — HIGH (ref 10.3–14.5)
SODIUM SERPL-SCNC: 136 MMOL/L — SIGNIFICANT CHANGE UP (ref 135–145)
WBC # BLD: 9.24 K/UL — SIGNIFICANT CHANGE UP (ref 3.8–10.5)
WBC # FLD AUTO: 9.24 K/UL — SIGNIFICANT CHANGE UP (ref 3.8–10.5)

## 2020-12-13 PROCEDURE — 71045 X-RAY EXAM CHEST 1 VIEW: CPT | Mod: 26

## 2020-12-13 RX ORDER — FUROSEMIDE 40 MG
40 TABLET ORAL EVERY 12 HOURS
Refills: 0 | Status: DISCONTINUED | OUTPATIENT
Start: 2020-12-14 | End: 2020-12-15

## 2020-12-13 RX ORDER — APIXABAN 2.5 MG/1
2.5 TABLET, FILM COATED ORAL EVERY 12 HOURS
Refills: 0 | Status: DISCONTINUED | OUTPATIENT
Start: 2020-12-13 | End: 2020-12-15

## 2020-12-13 RX ADMIN — APIXABAN 2.5 MILLIGRAM(S): 2.5 TABLET, FILM COATED ORAL at 06:08

## 2020-12-13 RX ADMIN — Medication 40 MILLIGRAM(S): at 06:08

## 2020-12-13 RX ADMIN — ISOSORBIDE MONONITRATE 120 MILLIGRAM(S): 60 TABLET, EXTENDED RELEASE ORAL at 12:53

## 2020-12-13 RX ADMIN — FINASTERIDE 5 MILLIGRAM(S): 5 TABLET, FILM COATED ORAL at 11:28

## 2020-12-13 RX ADMIN — APIXABAN 2.5 MILLIGRAM(S): 2.5 TABLET, FILM COATED ORAL at 19:00

## 2020-12-13 RX ADMIN — ATORVASTATIN CALCIUM 20 MILLIGRAM(S): 80 TABLET, FILM COATED ORAL at 21:33

## 2020-12-13 RX ADMIN — Medication 81 MILLIGRAM(S): at 11:28

## 2020-12-13 RX ADMIN — Medication 50 MICROGRAM(S): at 06:08

## 2020-12-13 RX ADMIN — Medication 100 MILLIGRAM(S): at 06:08

## 2020-12-13 NOTE — PROGRESS NOTE ADULT - ASSESSMENT
89 m with    Syncope / s/ p MI  - telemetry  - cardiology follow Dr. Padgett  - PPM check      CHF cr systolic   - Patient with right pleural effusion  - continue Lasix 40mg   - Cardiology follow    CAD  - medical Rx  - cardiology follow     Hypothyroidism (acquired).  - c/w synthroid.   - Follow TSH    Hypertension.  - control    Hyperlipidemia.   - c/w atorvastatin.    BPH (benign prostatic hyperplasia).   - c/w finasteride.     Leg edema.  - b/l LE edema, likely 2/2 to heart failure  - [facility] duplex no DVT     H/O cardiac arrhythmia.    - ? hx of arrhythmia s/p PPM  - pacemaker interrogation  - continue Eliquis but need to d/w cardiology the risk of falling and AC   - aspirin.     Ribs fractures  - pain control  - incentive spirometry    Prophylactic measure.  - Eliquis.     PT    Kofi Sanchez MD pager [phone]

## 2020-12-13 NOTE — PROGRESS NOTE ADULT - SUBJECTIVE AND OBJECTIVE BOX
Patient is a 89y old  Male who presents with a chief complaint of fall (12 Dec 2020 19:51)      SUBJECTIVE / OVERNIGHT EVENTS: Comfortable without new complaints.   Review of Systems  chest pain no  palpitations no  sob no  nausea no  headache no    MEDICATIONS  (STANDING):  aspirin enteric coated 81 milliGRAM(s) Oral daily  atorvastatin 20 milliGRAM(s) Oral at bedtime  finasteride 5 milliGRAM(s) Oral daily  isosorbide   mononitrate ER Tablet (IMDUR) 120 milliGRAM(s) Oral daily  levothyroxine 50 MICROGram(s) Oral daily  metoprolol succinate  milliGRAM(s) Oral daily    MEDICATIONS  (PRN):  acetaminophen   Tablet .. 650 milliGRAM(s) Oral every 6 hours PRN Temp greater or equal to 38.5C (101.3F), Mild Pain (1 - 3)      Vital Signs Last 24 Hrs  T(C): 36.5 (13 Dec 2020 11:35), Max: 36.5 (13 Dec 2020 11:35)  T(F): 97.7 (13 Dec 2020 11:35), Max: 97.7 (13 Dec 2020 11:35)  HR: 62 (13 Dec 2020 11:35) (60 - 62)  BP: 124/71 (13 Dec 2020 11:35) (124/71 - 140/75)  BP(mean): --  RR: 18 (13 Dec 2020 11:35) (18 - 18)  SpO2: 92% (13 Dec 2020 11:35) (91% - 93%)    PHYSICAL EXAM:  GENERAL: NAD, well-developed  HEAD:  Atraumatic, Normocephalic  EYES: EOMI, PERRLA, conjunctiva and sclera clear  NECK: Supple, No JVD  CHEST/LUNG: Clear to auscultation bilaterally; No wheeze Extensive bruise and hematoma R side back and chest.  HEART: Regular rate and rhythm; No murmurs, rubs, or gallops  ABDOMEN: Soft, Nontender, Nondistended; Bowel sounds present  EXTREMITIES:  2+ Peripheral Pulses, No clubbing, cyanosis, or edema  PSYCH: AAOx3  NEUROLOGY: non-focal  SKIN: No rashes or lesions    LABS:                        11.8   9.24  )-----------( 346      ( 13 Dec 2020 07:16 )             38.2         136  |  99  |  38<H>  ----------------------------<  81  4.2   |  22  |  1.83<H>    Ca    8.8      13 Dec 2020 07:16    TPro  6.2  /  Alb  3.3  /  TBili  1.1  /  DBili  x   /  AST  46<H>  /  ALT  28  /  AlkPhos  99  12-13      CARDIAC MARKERS ( 12 Dec 2020 07:25 )  x     / x     / 275 U/L / x     / 10.4 ng/mL  CARDIAC MARKERS ( 11 Dec 2020 18:11 )  x     / x     / 287 U/L / x     / 10.9 ng/mL      Urinalysis Basic - ( 12 Dec 2020 22:10 )    Color: Light Orange / Appearance: Slightly Turbid / S.022 / pH: x  Gluc: x / Ketone: Negative  / Bili: Negative / Urobili: Negative   Blood: x / Protein: 30 mg/dL / Nitrite: Negative   Leuk Esterase: Negative / RBC: 2120 /hpf / WBC 6 /HPF   Sq Epi: x / Non Sq Epi: 0 /hpf / Bacteria: Negative        Culture - Urine (collected 11 Dec 2020 02:22)  Source: .Urine Clean Catch (Midstream)  Final Report (11 Dec 2020 23:06):    <10,000 CFU/mL Normal Urogenital Mimi        RADIOLOGY & ADDITIONAL TESTS:    Imaging Personally Reviewed:    Consultant(s) Notes Reviewed:      Care Discussed with Consultants/Other Providers:

## 2020-12-13 NOTE — PROGRESS NOTE ADULT - SUBJECTIVE AND OBJECTIVE BOX
INTERVAL HPI/OVERNIGHT EVENTS: patient feels well, on lasic 40 IV daily. Notably has hematuria on Eliquis    MEDICATIONS  (STANDING):  aspirin enteric coated 81 milliGRAM(s) Oral daily  atorvastatin 20 milliGRAM(s) Oral at bedtime  finasteride 5 milliGRAM(s) Oral daily  isosorbide   mononitrate ER Tablet (IMDUR) 120 milliGRAM(s) Oral daily  levothyroxine 50 MICROGram(s) Oral daily  metoprolol succinate  milliGRAM(s) Oral daily    MEDICATIONS  (PRN):  acetaminophen   Tablet .. 650 milliGRAM(s) Oral every 6 hours PRN Temp greater or equal to 38.5C (101.3F), Mild Pain (1 - 3)      Allergies    No Known Allergies    Intolerances      ROS:  General: Pt denies recent weight loss/fever/chills    Neurological: denies numbness or  sensation loss    Cardiovascular: denies chest pain/palpitations/leg edema    Respiratory and Thorax: denies SOB/cough/wheezing    Gastrointestinal: denies abdominal pain/diarrhea/constipation/bloody stool    Genitourinary: denies urinary frequency/urgency/ dysuria    Musculoskeletal: denies joint pain or swelling, denies restricted motion    Hematologic: denies abnormal bleeding  	    	  	    		        	    	            Vital Signs Last 24 Hrs  T(C): 36.5 (13 Dec 2020 11:35), Max: 36.5 (13 Dec 2020 11:35)  T(F): 97.7 (13 Dec 2020 11:35), Max: 97.7 (13 Dec 2020 11:35)  HR: 62 (13 Dec 2020 11:35) (60 - 62)  BP: 124/71 (13 Dec 2020 11:35) (124/71 - 140/75)  BP(mean): --  RR: 18 (13 Dec 2020 11:35) (18 - 18)  SpO2: 92% (13 Dec 2020 11:35) (91% - 93%)  Daily     Daily Weight in k.5 (13 Dec 2020 06:56)     @ 07:01  -   @ 07:00  --------------------------------------------------------  IN: 770 mL / OUT: 1700 mL / NET: -930 mL     @ 07:01  -   @ 15:40  --------------------------------------------------------  IN: 0 mL / OUT: 800 mL / NET: -800 mL      Physical Exam:     wdwn male  noJVD  cor RRr  lung clear  abd soft  ext no edema  extensive hematoma back and chest resolving      LABS:                        11.8   9.24  )-----------( 346      ( 13 Dec 2020 07:16 )             38.2         136  |  99  |  38<H>  ----------------------------<  81  4.2   |  22  |  1.83<H>    Ca    8.8      13 Dec 2020 07:16    TPro  6.2  /  Alb  3.3  /  TBili  1.1  /  DBili  x   /  AST  46<H>  /  ALT  28  /  AlkPhos  99        Urinalysis Basic - ( 12 Dec 2020 22:10 )    Color: Light Orange / Appearance: Slightly Turbid / S.022 / pH: x  Gluc: x / Ketone: Negative  / Bili: Negative / Urobili: Negative   Blood: x / Protein: 30 mg/dL / Nitrite: Negative   Leuk Esterase: Negative / RBC: 2120 /hpf / WBC 6 /HPF   Sq Epi: x / Non Sq Epi: 0 /hpf / Bacteria: Negative        RADIOLOGY & ADDITIONAL TESTS:    TELE:    EKG:

## 2020-12-14 ENCOUNTER — TRANSCRIPTION ENCOUNTER (OUTPATIENT)
Age: 85
End: 2020-12-14

## 2020-12-14 LAB
ANION GAP SERPL CALC-SCNC: 13 MMOL/L — SIGNIFICANT CHANGE UP (ref 5–17)
BUN SERPL-MCNC: 33 MG/DL — HIGH (ref 7–23)
CALCIUM SERPL-MCNC: 8.9 MG/DL — SIGNIFICANT CHANGE UP (ref 8.4–10.5)
CHLORIDE SERPL-SCNC: 100 MMOL/L — SIGNIFICANT CHANGE UP (ref 96–108)
CO2 SERPL-SCNC: 24 MMOL/L — SIGNIFICANT CHANGE UP (ref 22–31)
CREAT SERPL-MCNC: 1.79 MG/DL — HIGH (ref 0.5–1.3)
GLUCOSE SERPL-MCNC: 89 MG/DL — SIGNIFICANT CHANGE UP (ref 70–99)
HCT VFR BLD CALC: 37.5 % — LOW (ref 39–50)
HGB BLD-MCNC: 11.7 G/DL — LOW (ref 13–17)
MAGNESIUM SERPL-MCNC: 2.2 MG/DL — SIGNIFICANT CHANGE UP (ref 1.6–2.6)
MCHC RBC-ENTMCNC: 27.7 PG — SIGNIFICANT CHANGE UP (ref 27–34)
MCHC RBC-ENTMCNC: 31.2 GM/DL — LOW (ref 32–36)
MCV RBC AUTO: 88.9 FL — SIGNIFICANT CHANGE UP (ref 80–100)
NRBC # BLD: 0 /100 WBCS — SIGNIFICANT CHANGE UP (ref 0–0)
PHOSPHATE SERPL-MCNC: 3 MG/DL — SIGNIFICANT CHANGE UP (ref 2.5–4.5)
PLATELET # BLD AUTO: 340 K/UL — SIGNIFICANT CHANGE UP (ref 150–400)
POTASSIUM SERPL-MCNC: 3.9 MMOL/L — SIGNIFICANT CHANGE UP (ref 3.5–5.3)
POTASSIUM SERPL-SCNC: 3.9 MMOL/L — SIGNIFICANT CHANGE UP (ref 3.5–5.3)
RBC # BLD: 4.22 M/UL — SIGNIFICANT CHANGE UP (ref 4.2–5.8)
RBC # FLD: 16.7 % — HIGH (ref 10.3–14.5)
SODIUM SERPL-SCNC: 137 MMOL/L — SIGNIFICANT CHANGE UP (ref 135–145)
WBC # BLD: 8.75 K/UL — SIGNIFICANT CHANGE UP (ref 3.8–10.5)
WBC # FLD AUTO: 8.75 K/UL — SIGNIFICANT CHANGE UP (ref 3.8–10.5)

## 2020-12-14 PROCEDURE — 99222 1ST HOSP IP/OBS MODERATE 55: CPT | Mod: GC

## 2020-12-14 RX ORDER — ISOSORBIDE MONONITRATE 60 MG/1
1 TABLET, EXTENDED RELEASE ORAL
Qty: 30 | Refills: 0
Start: 2020-12-14 | End: 2021-01-12

## 2020-12-14 RX ORDER — FUROSEMIDE 40 MG
1 TABLET ORAL
Qty: 60 | Refills: 0
Start: 2020-12-14 | End: 2021-01-12

## 2020-12-14 RX ADMIN — Medication 100 MILLIGRAM(S): at 05:57

## 2020-12-14 RX ADMIN — FINASTERIDE 5 MILLIGRAM(S): 5 TABLET, FILM COATED ORAL at 13:16

## 2020-12-14 RX ADMIN — ISOSORBIDE MONONITRATE 120 MILLIGRAM(S): 60 TABLET, EXTENDED RELEASE ORAL at 13:17

## 2020-12-14 RX ADMIN — ATORVASTATIN CALCIUM 20 MILLIGRAM(S): 80 TABLET, FILM COATED ORAL at 22:14

## 2020-12-14 RX ADMIN — APIXABAN 2.5 MILLIGRAM(S): 2.5 TABLET, FILM COATED ORAL at 17:27

## 2020-12-14 RX ADMIN — Medication 40 MILLIGRAM(S): at 05:57

## 2020-12-14 RX ADMIN — Medication 81 MILLIGRAM(S): at 13:15

## 2020-12-14 RX ADMIN — APIXABAN 2.5 MILLIGRAM(S): 2.5 TABLET, FILM COATED ORAL at 05:57

## 2020-12-14 RX ADMIN — Medication 40 MILLIGRAM(S): at 17:27

## 2020-12-14 RX ADMIN — Medication 50 MICROGRAM(S): at 05:57

## 2020-12-14 NOTE — DISCHARGE NOTE NURSING/CASE MANAGEMENT/SOCIAL WORK - PATIENT PORTAL LINK FT
You can access the FollowMyHealth Patient Portal offered by [facility] by registering at the following website: [email or web address]. By joining [employer]’s FollowMyHealth portal, you will also be able to view your health information using other applications (apps) compatible with our system.

## 2020-12-14 NOTE — DISCHARGE NOTE PROVIDER - CARE PROVIDER_API CALL
Danny Padgett E  CARDIOLOGY  [address], [address]  [place] [address]  Phone: [phone]  Fax: [phone]  Follow Up Time:

## 2020-12-14 NOTE — CONSULT NOTE ADULT - SUBJECTIVE AND OBJECTIVE BOX
HPI:  89M with PMH HTN, CAD, SSS s/p pacemaker, afib who presented after fall two weeks prior to admission. He is living at a nursing home. He did not remember the events of the fall or report any complaints at that time or moving forward for the next two weeks. Patient was later noted to have extensive ecchymosis of the right flank and he was brought in for further evaluation. CT head was negative for acute changes. CT chest/abdomen/pelvis demonstrated multiple right rib fractures and findings as below. EP was consulted and patient was found to have elevated troponin. Per cardiology, patient may have had myocardial infarction of unknown date. His PPM was interrogated and demonstrated measured data WNL, normal PM function.     His course was complicated by acute on chronic heart failure with right pleural effusion and LE edema, hematuria.    Patient seen and examined at bedside. Reports that overall he is feeling well. Denies pain.     Imaging showed:  CTH  - IMPRESSION: No CT evidence of acute hemorrhage or acute calvarial fracture. Nonspecific partial opacification of right mastoid air cells.    CT Chest/Abdomen/Pelvis - IMPRESSION: 1. Multiple right sided posterior rib fracture deformities as above.  2. Moderate right pleural effusion and probable associated compressive atelectasis right lung base.  3. Small left pleural effusion.  4. Large left inguinal hernia containing large bowel which is nonobstructed/nonstrangulated, and also small amount of ascites.      REVIEW OF SYSTEMS  Constitutional - No fever,  No fatigue  HEENT - No eye pain, No visual disturbances, No difficulty hearing,  Respiratory - No cough, No wheezing, No shortness of breath  Cardiovascular - No chest pain, No palpitations  Gastrointestinal - No abdominal pain, No nausea, No vomiting  Genitourinary - No dysuria, No hematuria,  Neurological - No headaches, + loss of strength, No numbness  Skin - No itching, No rashes,  Endocrine - No temperature intolerance  Musculoskeletal - No joint pain, No muscle pain  Psychiatric - No depression, No anxiety    VITALS  T(C): 36.6 (20 @ 05:04), Max: 36.6 (20 @ 05:04)  HR: 60 (20 @ 05:04) (60 - 62)  BP: 155/76 (20 @ 05:04) (121/80 - 155/76)  RR: 18 (20 @ 05:04) (18 - 18)  SpO2: 97% (20 @ 05:04) (92% - 97%)  Wt(kg): --    PAST MEDICAL & SURGICAL HISTORY  Pacemaker    H/O cardiac arrhythmia    Hypothyroidism (acquired)    BPH (benign prostatic hyperplasia)    Bradycardia    Hyperlipidemia    Hypertension    S/P tonsillectomy    Pacemaker        SOCIAL HISTORY  Smoking - Former  EtOH - Current, 2-4 times a month  Drugs - Denied    FUNCTIONAL HISTORY  Patient is from a nursing home. He needed assistance with ADLs and mobility. He ambulated with RW.    CURRENT FUNCTIONAL STATUS -   Transfers: min assist  Gait: 25ft with RW with CG    FAMILY HISTORY   FH: heart disease    No pertinent family history in first degree relatives    HTN (hypertension)        RECENT LABS/IMAGING  CBC Full  -  ( 14 Dec 2020 06:57 )  WBC Count : 8.75 K/uL  RBC Count : 4.22 M/uL  Hemoglobin : 11.7 g/dL  Hematocrit : 37.5 %  Platelet Count - Automated : 340 K/uL  Mean Cell Volume : 88.9 fl  Mean Cell Hemoglobin : 27.7 pg  Mean Cell Hemoglobin Concentration : 31.2 gm/dL  Auto Neutrophil # : x  Auto Lymphocyte # : x  Auto Monocyte # : x  Auto Eosinophil # : x  Auto Basophil # : x  Auto Neutrophil % : x  Auto Lymphocyte % : x  Auto Monocyte % : x  Auto Eosinophil % : x  Auto Basophil % : x    -    137  |  100  |  33<H>  ----------------------------<  89  3.9   |  24  |  1.79<H>    Ca    8.9      14 Dec 2020 06:57    TPro  6.2  /  Alb  3.3  /  TBili  1.1  /  DBili  x   /  AST  46<H>  /  ALT  28  /  AlkPhos  99  12-13    Urinalysis Basic - ( 12 Dec 2020 22:10 )    Color: Light Orange / Appearance: Slightly Turbid / S.022 / pH: x  Gluc: x / Ketone: Negative  / Bili: Negative / Urobili: Negative   Blood: x / Protein: 30 mg/dL / Nitrite: Negative   Leuk Esterase: Negative / RBC: 2120 /hpf / WBC 6 /HPF   Sq Epi: x / Non Sq Epi: 0 /hpf / Bacteria: Negative        ALLERGIES  No Known Allergies      MEDICATIONS   acetaminophen   Tablet .. 650 milliGRAM(s) Oral every 6 hours PRN  apixaban 2.5 milliGRAM(s) Oral every 12 hours  aspirin enteric coated 81 milliGRAM(s) Oral daily  atorvastatin 20 milliGRAM(s) Oral at bedtime  finasteride 5 milliGRAM(s) Oral daily  furosemide    Tablet 40 milliGRAM(s) Oral every 12 hours  isosorbide   mononitrate ER Tablet (IMDUR) 120 milliGRAM(s) Oral daily  levothyroxine 50 MICROGram(s) Oral daily  metoprolol succinate  milliGRAM(s) Oral daily    ----------------------------------------------------------------------------------------  PHYSICAL EXAM  Constitutional - NAD, Comfortable  Chest - breathing comfortably, no increased work of breathing  Cardiovascular - warm, well perfused  Extremities - No calf tenderness   Neurologic Exam -                    Cognitive - Awake, Alert. Answering questions and following commands appropriately      Communication - Fluent     Motor - Moving all 4 extremities and providing resistance throughout      Sensory - Intact to LT  Psychiatric - Mood stable, Affect WNL  ----------------------------------------------------------------------------------------     HPI:  89M with PMH HTN, CAD, SSS s/p pacemaker, afib who presented after fall two weeks prior to admission. He is living at a nursing home. He did not remember the events of the fall or report any complaints at that time or moving forward for the next two weeks. Patient was later noted to have extensive ecchymosis of the right flank and he was brought in for further evaluation. CT head was negative for acute changes. CT chest/abdomen/pelvis demonstrated multiple right rib fractures and findings as below. EP was consulted and patient was found to have elevated troponin. Per cardiology, patient may have had myocardial infarction of unknown date. His PPM was interrogated and demonstrated measured data WNL, normal PM function.     His course was complicated by acute on chronic heart failure with right pleural effusion and LE edema, hematuria.    Patient seen and examined at bedside. Reports that overall he is feeling well. Denies pain.     Imaging showed:  CTH  - IMPRESSION: No CT evidence of acute hemorrhage or acute calvarial fracture. Nonspecific partial opacification of right mastoid air cells.    CT Chest/Abdomen/Pelvis - IMPRESSION: 1. Multiple right sided posterior rib fracture deformities as above.  2. Moderate right pleural effusion and probable associated compressive atelectasis right lung base.  3. Small left pleural effusion.  4. Large left inguinal hernia containing large bowel which is nonobstructed/nonstrangulated, and also small amount of ascites.      REVIEW OF SYSTEMS  Constitutional - No fever,  No fatigue  HEENT - No eye pain, No visual disturbances, No difficulty hearing,  Respiratory - No cough, No wheezing, No shortness of breath  Cardiovascular - No chest pain, No palpitations  Gastrointestinal - No abdominal pain, No nausea, No vomiting  Genitourinary - No dysuria, No hematuria,  Neurological - No headaches, + loss of strength, No numbness  Skin - No itching, No rashes,  Endocrine - No temperature intolerance  Musculoskeletal - No joint pain, No muscle pain  Psychiatric - No depression, No anxiety    VITALS  T(C): 36.6 (20 @ 05:04), Max: 36.6 (20 @ 05:04)  HR: 60 (20 @ 05:04) (60 - 62)  BP: 155/76 (20 @ 05:04) (121/80 - 155/76)  RR: 18 (20 @ 05:04) (18 - 18)  SpO2: 97% (20 @ 05:04) (92% - 97%)  Wt(kg): --    PAST MEDICAL & SURGICAL HISTORY  Pacemaker    H/O cardiac arrhythmia    Hypothyroidism (acquired)    BPH (benign prostatic hyperplasia)    Bradycardia    Hyperlipidemia    Hypertension    S/P tonsillectomy    Pacemaker        SOCIAL HISTORY  Smoking - Former  EtOH - Current, 2-4 times a month  Drugs - Denied    FUNCTIONAL HISTORY  Patient is from a nursing home. He needed assistance with ADLs and mobility. He ambulated with RW.    CURRENT FUNCTIONAL STATUS -   Transfers: min assist  Gait: 25ft with RW with CG    FAMILY HISTORY   FH: heart disease    No pertinent family history in first degree relatives    HTN (hypertension)        RECENT LABS/IMAGING  CBC Full  -  ( 14 Dec 2020 06:57 )  WBC Count : 8.75 K/uL  RBC Count : 4.22 M/uL  Hemoglobin : 11.7 g/dL  Hematocrit : 37.5 %  Platelet Count - Automated : 340 K/uL  Mean Cell Volume : 88.9 fl  Mean Cell Hemoglobin : 27.7 pg  Mean Cell Hemoglobin Concentration : 31.2 gm/dL  Auto Neutrophil # : x  Auto Lymphocyte # : x  Auto Monocyte # : x  Auto Eosinophil # : x  Auto Basophil # : x  Auto Neutrophil % : x  Auto Lymphocyte % : x  Auto Monocyte % : x  Auto Eosinophil % : x  Auto Basophil % : x    -    137  |  100  |  33<H>  ----------------------------<  89  3.9   |  24  |  1.79<H>    Ca    8.9      14 Dec 2020 06:57    TPro  6.2  /  Alb  3.3  /  TBili  1.1  /  DBili  x   /  AST  46<H>  /  ALT  28  /  AlkPhos  99  12-13    Urinalysis Basic - ( 12 Dec 2020 22:10 )    Color: Light Orange / Appearance: Slightly Turbid / S.022 / pH: x  Gluc: x / Ketone: Negative  / Bili: Negative / Urobili: Negative   Blood: x / Protein: 30 mg/dL / Nitrite: Negative   Leuk Esterase: Negative / RBC: 2120 /hpf / WBC 6 /HPF   Sq Epi: x / Non Sq Epi: 0 /hpf / Bacteria: Negative        ALLERGIES  No Known Allergies      MEDICATIONS   acetaminophen   Tablet .. 650 milliGRAM(s) Oral every 6 hours PRN  apixaban 2.5 milliGRAM(s) Oral every 12 hours  aspirin enteric coated 81 milliGRAM(s) Oral daily  atorvastatin 20 milliGRAM(s) Oral at bedtime  finasteride 5 milliGRAM(s) Oral daily  furosemide    Tablet 40 milliGRAM(s) Oral every 12 hours  isosorbide   mononitrate ER Tablet (IMDUR) 120 milliGRAM(s) Oral daily  levothyroxine 50 MICROGram(s) Oral daily  metoprolol succinate  milliGRAM(s) Oral daily    ----------------------------------------------------------------------------------------  PHYSICAL EXAM  Constitutional - NAD, Comfortable  Chest - breathing comfortably, no increased work of breathing  Cardiovascular - warm, well perfused  Extremities - No calf tenderness   Skin- + eccymoses on back  Neurologic Exam -                    Cognitive - Awake, Alert. Answering questions and following commands appropriately      Communication - Fluent     Motor - Moving all 4 extremities and providing resistance throughout      Sensory - Intact to LT  Psychiatric - Mood stable, Affect WNL  ----------------------------------------------------------------------------------------

## 2020-12-14 NOTE — DISCHARGE NOTE PROVIDER - NSDCCPCAREPLAN_GEN_ALL_CORE_FT
PRINCIPAL DISCHARGE DIAGNOSIS  Diagnosis: Syncope and collapse  Assessment and Plan of Treatment: Please follow up with outpatient cardiology      SECONDARY DISCHARGE DIAGNOSES  Diagnosis: Closed fracture of multiple ribs of right side, initial encounter  Assessment and Plan of Treatment: Fall precaution  physical therapy    Diagnosis: Acute on chronic diastolic (congestive) heart failure  Assessment and Plan of Treatment: Weigh yourself daily.  If you gain 3lbs in 3 days, or 5lbs in a week call your Health Care Provider.  Do not eat or drink foods containing more than 2000mg of salt (sodium) in your diet every day.  Call your Health Care Provider if you have any swelling or increased swelling in your feet, ankles, and/or stomach.  Take all of your medication as directed.  If you become dizzy call your Health Care Provider.      Diagnosis: Myocardial infarction, subendocardial  Assessment and Plan of Treatment:      PRINCIPAL DISCHARGE DIAGNOSIS  Diagnosis: Syncope and collapse  Assessment and Plan of Treatment: Please follow up with outpatient cardiology      SECONDARY DISCHARGE DIAGNOSES  Diagnosis: HTN (hypertension)  Assessment and Plan of Treatment: Low salt diet  Activity as tolerated.  Take all medication as prescribed.  Follow up with your medical doctor for routine blood pressure monitoring at your next visit.  Notify your doctor if you have any of the following symptoms:   Dizziness, Lightheadedness, Blurry vision, Headache, Chest pain, Shortness of breath      Diagnosis: Hypothyroidism  Assessment and Plan of Treatment: you do not make enough thyroid hormone  signs & symptoms of low levels of thyroid hormone - tired, getting cold easily, coarse or thin hair, constipation, shortness of breath, swelling, irregular periods  your doctor will do thyroid hormone blood tests at least once a year to monitor if medication dose is adequate  take your thyroid medicine as directed by your doctor      Diagnosis: Closed fracture of multiple ribs of right side, initial encounter  Assessment and Plan of Treatment: Fall precaution  physical therapy    Diagnosis: Acute on chronic diastolic (congestive) heart failure  Assessment and Plan of Treatment: Weigh yourself daily.  If you gain 3lbs in 3 days, or 5lbs in a week call your Health Care Provider.  Do not eat or drink foods containing more than 2000mg of salt (sodium) in your diet every day.  Call your Health Care Provider if you have any swelling or increased swelling in your feet, ankles, and/or stomach.  Take all of your medication as directed.  If you become dizzy call your Health Care Provider.      Diagnosis: Myocardial infarction, subendocardial  Assessment and Plan of Treatment: Low salt, low fat diet.   Weight management.   Take medications as prescribed.    No smoking.  Follow up appointments with your doctor(s)  as instruced.

## 2020-12-14 NOTE — PROGRESS NOTE ADULT - PROBLEM SELECTOR PLAN 2
physical therapy continuing => if returning to assisted living without therapy> should the patient continue eliquis> will discuss risk and benefits with son in AM

## 2020-12-14 NOTE — CONSULT NOTE ADULT - ASSESSMENT
------------------------------------------------------------------------------------------------  ASSESSMENT/PLAN  89yMale with functional deficits after fall found to have acute on chronic CHF, possible, rib fractures.   Fall - management as per primary team  Afib - Apixaban, metoprolol  CHF - Lasix, Imdur, Toprolol  Pain - Tylenol  DVT PPX - Apixaban, SCDs  PT- ROM, Bed Mob, Transfers, Amb w AD and bracing as needed  OT- ADLs, bracing as needed  Prec- Falls, Cardiac  Skin- Turn q2 h  Dispo- Once medically stabilized, Recommend KADY, patient DOES NOT meet acute inpatient rehabilitation criteria FINAL RECS PENDING ATTENDING ROUNDS   ------------------------------------------------------------------------------------------------  ASSESSMENT/PLAN  89yMale with functional deficits after fall found to have acute on chronic CHF, possible, rib fractures.   Fall - management as per primary team  Afib - Apixaban, metoprolol  CHF - Lasix, Imdur, Toprolol  Pain - Tylenol  DVT PPX - Apixaban, SCDs  PT- ROM, Bed Mob, Transfers, Amb w AD and bracing as needed  OT- ADLs, bracing as needed  Prec- Falls, Cardiac  Skin- Turn q2 h  Dispo- Once medically stabilized, Recommend KADY, patient DOES NOT meet acute inpatient rehabilitation criteria

## 2020-12-14 NOTE — PROGRESS NOTE ADULT - PROBLEM SELECTOR PLAN 1
COntinue imdur 120. Furosemide 40 mg po BID. continue metoprolol 100 XL daily. ASA 81 mg daily. medical management with extremes of age and renal insufficiency.

## 2020-12-14 NOTE — DISCHARGE NOTE PROVIDER - NSDCMRMEDTOKEN_GEN_ALL_CORE_FT
acetaminophen 325 mg oral tablet: 2 tab(s) orally every 8 hours, As needed, Mild &amp; Moderate pain (1 - 6)  aspirin 81 mg oral delayed release tablet: 1 tab(s) orally once a day  atorvastatin 20 mg oral tablet: 1 tab(s) orally once a day (at bedtime)  Eliquis 2.5 mg oral tablet: 1 tab(s) orally 2 times a day  finasteride 5 mg oral tablet: 1 tab(s) orally once a day  furosemide 40 mg oral tablet: 1 tab(s) orally once a day  isosorbide mononitrate 60 mg oral tablet, extended release: 1 tab(s) orally once a day  levothyroxine 50 mcg (0.05 mg) oral tablet: 1 tab(s) orally once a day  metoprolol succinate 100 mg oral tablet, extended release: 1 tab(s) orally once a day   acetaminophen 325 mg oral tablet: 2 tab(s) orally every 8 hours, As needed, Mild &amp; Moderate pain (1 - 6)  aspirin 81 mg oral delayed release tablet: 1 tab(s) orally once a day  atorvastatin 20 mg oral tablet: 1 tab(s) orally once a day (at bedtime)  Eliquis 2.5 mg oral tablet: 1 tab(s) orally 2 times a day  finasteride 5 mg oral tablet: 1 tab(s) orally once a day  furosemide 40 mg oral tablet: 1 tab(s) orally every 12 hours  isosorbide mononitrate 120 mg oral tablet, extended release: 1 tab(s) orally once a day  levothyroxine 50 mcg (0.05 mg) oral tablet: 1 tab(s) orally once a day  metoprolol succinate 100 mg oral tablet, extended release: 1 tab(s) orally once a day

## 2020-12-14 NOTE — PROGRESS NOTE ADULT - SUBJECTIVE AND OBJECTIVE BOX
Patient is a 89y old  Male who presents with a chief complaint of fall (12 Dec 2020 19:51)      SUBJECTIVE / OVERNIGHT EVENTS: Comfortable without new complaints.   Review of Systems  chest pain no  palpitations no  sob no  nausea no  headache no    MEDICATIONS  (STANDING):  apixaban 2.5 milliGRAM(s) Oral every 12 hours  aspirin enteric coated 81 milliGRAM(s) Oral daily  atorvastatin 20 milliGRAM(s) Oral at bedtime  finasteride 5 milliGRAM(s) Oral daily  furosemide    Tablet 40 milliGRAM(s) Oral every 12 hours  isosorbide   mononitrate ER Tablet (IMDUR) 120 milliGRAM(s) Oral daily  levothyroxine 50 MICROGram(s) Oral daily  metoprolol succinate  milliGRAM(s) Oral daily    MEDICATIONS  (PRN):  acetaminophen   Tablet .. 650 milliGRAM(s) Oral every 6 hours PRN Temp greater or equal to 38.5C (101.3F), Mild Pain (1 - 3)      Vital Signs Last 24 Hrs  T(C): 36.3 (14 Dec 2020 12:01), Max: 36.6 (14 Dec 2020 05:04)  T(F): 97.4 (14 Dec 2020 12:01), Max: 97.8 (14 Dec 2020 05:04)  HR: 60 (14 Dec 2020 12:01) (60 - 60)  BP: 115/68 (14 Dec 2020 12:01) (115/68 - 155/76)  BP(mean): --  RR: 18 (14 Dec 2020 12:01) (18 - 18)  SpO2: 96% (14 Dec 2020 12:01) (94% - 97%)    PHYSICAL EXAM:  GENERAL: NAD, well-developed  HEAD:  Atraumatic, Normocephalic  EYES: EOMI, PERRLA, conjunctiva and sclera clear  NECK: Supple, No JVD  CHEST/LUNG: R sided ecchymosis and hematoma stable Clear to auscultation bilaterally; No wheeze  HEART: Regular rate and rhythm; No murmurs, rubs, or gallops  ABDOMEN: Soft, Nontender, Nondistended; Bowel sounds present  EXTREMITIES:  2+ Peripheral Pulses, No clubbing, cyanosis, or edema  PSYCH: AAOx3  NEUROLOGY: non-focal  SKIN: No rashes or lesions    LABS:                        11.7   8.75  )-----------( 340      ( 14 Dec 2020 06:57 )             37.5     12-14    137  |  100  |  33<H>  ----------------------------<  89  3.9   |  24  |  1.79<H>    Ca    8.9      14 Dec 2020 06:57  Phos  3.0     12-14  Mg     2.2     12-14    TPro  6.2  /  Alb  3.3  /  TBili  1.1  /  DBili  x   /  AST  46<H>  /  ALT  28  /  AlkPhos  99  12-13          Urinalysis Basic - ( 12 Dec 2020 22:10 )    Color: Light Orange / Appearance: Slightly Turbid / S.022 / pH: x  Gluc: x / Ketone: Negative  / Bili: Negative / Urobili: Negative   Blood: x / Protein: 30 mg/dL / Nitrite: Negative   Leuk Esterase: Negative / RBC: 2120 /hpf / WBC 6 /HPF   Sq Epi: x / Non Sq Epi: 0 /hpf / Bacteria: Negative          RADIOLOGY & ADDITIONAL TESTS:    Imaging Personally Reviewed:    Consultant(s) Notes Reviewed:      Care Discussed with Consultants/Other Providers:

## 2020-12-14 NOTE — PROGRESS NOTE ADULT - SUBJECTIVE AND OBJECTIVE BOX
INTERVAL HPI/OVERNIGHT EVENTS: clemente feels well, wants to return to assisted living despite falls.   has been educated to ask for help showering     notably had 5 b WCT    MEDICATIONS  (STANDING):  apixaban 2.5 milliGRAM(s) Oral every 12 hours  aspirin enteric coated 81 milliGRAM(s) Oral daily  atorvastatin 20 milliGRAM(s) Oral at bedtime  finasteride 5 milliGRAM(s) Oral daily  furosemide    Tablet 40 milliGRAM(s) Oral every 12 hours  isosorbide   mononitrate ER Tablet (IMDUR) 120 milliGRAM(s) Oral daily  levothyroxine 50 MICROGram(s) Oral daily  metoprolol succinate  milliGRAM(s) Oral daily    MEDICATIONS  (PRN):  acetaminophen   Tablet .. 650 milliGRAM(s) Oral every 6 hours PRN Temp greater or equal to 38.5C (101.3F), Mild Pain (1 - 3)      Allergies    No Known Allergies    Intolerances      ROS:  General: Pt denies recent weight loss/fever/chills    Neurological: denies numbness or  sensation loss    Cardiovascular: denies chest pain/palpitations/leg edema    Respiratory and Thorax: denies SOB/cough/wheezing    Gastrointestinal: denies abdominal pain/diarrhea/constipation/bloody stool    Genitourinary: denies urinary frequency/urgency/ dysuria    Musculoskeletal: denies joint pain or swelling, denies restricted motion    Hematologic: denies abnormal bleeding  	    	  	    		        	    	            Vital Signs Last 24 Hrs  T(C): 36.3 (14 Dec 2020 12:01), Max: 36.6 (14 Dec 2020 05:04)  T(F): 97.4 (14 Dec 2020 12:01), Max: 97.8 (14 Dec 2020 05:04)  HR: 60 (14 Dec 2020 12:01) (60 - 60)  BP: 115/68 (14 Dec 2020 12:01) (115/68 - 155/76)  BP(mean): --  RR: 18 (14 Dec 2020 12:01) (18 - 18)  SpO2: 96% (14 Dec 2020 12:01) (94% - 97%)  Daily     Daily Weight in k.5 (14 Dec 2020 05:04)    12-13 @ 07:01  -  12-14 @ 07:00  --------------------------------------------------------  IN: 50 mL / OUT: 1350 mL / NET: -1300 mL     @ 07:01  -   @ 20:05  --------------------------------------------------------  IN: 600 mL / OUT: 1000 mL / NET: -400 mL      Physical Exam:    wdwn male  noJVD  cor RRr  lung clear  abd soft   ext noedema    hematoma on back and arm resolving      LABS:                        11.7   8.75  )-----------( 340      ( 14 Dec 2020 06:57 )             37.5         137  |  100  |  33<H>  ----------------------------<  89  3.9   |  24  |  1.79<H>    Ca    8.9      14 Dec 2020 06:57  Phos  3.0       Mg     2.2         TPro  6.2  /  Alb  3.3  /  TBili  1.1  /  DBili  x   /  AST  46<H>  /  ALT  28  /  AlkPhos  99  12-13      Urinalysis Basic - ( 12 Dec 2020 22:10 )    Color: Light Orange / Appearance: Slightly Turbid / S.022 / pH: x  Gluc: x / Ketone: Negative  / Bili: Negative / Urobili: Negative   Blood: x / Protein: 30 mg/dL / Nitrite: Negative   Leuk Esterase: Negative / RBC: 2120 /hpf / WBC 6 /HPF   Sq Epi: x / Non Sq Epi: 0 /hpf / Bacteria: Negative        RADIOLOGY & ADDITIONAL TESTS:    TELE:    EKG:

## 2020-12-14 NOTE — CHART NOTE - NSCHARTNOTEFT_GEN_A_CORE
CLEMENTE BULLOCK    Notified by RN patient with  5 beats of wide complex   patient asymptomatic   Vital Signs Last 24 Hrs  T(C): 36.3 (14 Dec 2020 12:01), Max: 36.6 (14 Dec 2020 05:04)  T(F): 97.4 (14 Dec 2020 12:01), Max: 97.8 (14 Dec 2020 05:04)  HR: 60 (14 Dec 2020 12:01) (60 - 60)  BP: 115/68 (14 Dec 2020 12:01) (115/68 - 155/76)  BP(mean): --  RR: 18 (14 Dec 2020 12:01) (18 - 18)  SpO2: 96% (14 Dec 2020 12:01) (94% - 97%)                        11.7   8.75  )-----------( 340      ( 14 Dec 2020 06:57 )             37.5      # 5 beats of wide complex   Interventions taken     Monitor vital signs   continue with tele monitor   Discussed with Dr Padgett  and Dr. Sanchez  continue to monitor patient   continue with current medications   monitor lytes   will signs out to NP/PA at nights                   Tammi Marroquin NP-C

## 2020-12-14 NOTE — CONSULT NOTE ADULT - ATTENDING COMMENTS
Seen and examined with resident. Agree with note.   89yMale with functional deficits after fall found to have acute on chronic CHF, possible, rib fractures.   Patient will need subacute rehabilitation when stable.

## 2020-12-14 NOTE — PROGRESS NOTE ADULT - ASSESSMENT
89 m with    Syncope / s/ p MI  - telemetry  - cardiology follow Dr. Padgett  - PPM check      CHF cr systolic   - Patient with right pleural effusion  - continue Lasix 40mg   - Cardiology follow    CAD  - medical Rx  - cardiology follow     Hypothyroidism (acquired).  - c/w synthroid.   - Follow TSH    Hypertension.  - control    Hyperlipidemia.   - c/w atorvastatin.    BPH (benign prostatic hyperplasia).   - c/w finasteride.     Leg edema.  - b/l LE edema, likely 2/2 to heart failure  - [facility] duplex no DVT     H/O cardiac arrhythmia.    - ? hx of arrhythmia s/p PPM  - pacemaker interrogation  - continue Eliquis but need to d/w cardiology the risk of falling and AC   - aspirin.     Ribs fractures  - pain control  - incentive spirometry    Prophylactic measure.  - Eliquis.     PT    DCP rehab    Kofi Sanchez MD pager [phone]

## 2020-12-14 NOTE — DISCHARGE NOTE PROVIDER - HOSPITAL COURSE
89 year male with HTN hyperlipidemia CAD  + stress test HFpEF, recent hosptial admit with pneumonia and CHF,  now placed in isolation->fall with bruise-> sent to outside hospital again placed in isolation, now post fall with injuury while attempting to shower. Patient also may have had an infarct ,date unknown .    patient improved post furosemide IV      Problem/Plan - 1:  ·  Problem: Myocardial infarction, subendocardial.  Plan: increase imdur to 120. change furosemide to 40 mg po BID. continue metoprolol 100 XL daily.      Problem/Plan - 2:  ·  Problem: Closed fracture of multiple ribs of right side, initial encounter.  Plan: physical therapy continuing.      Problem/Plan - 3:  ·  Problem: Pacemaker.  Plan: pacer fx normally, hematuria ?  hold Eliquis . IF no further hematuria- > reinitiate this.      Problem/Plan - 4:  ·  Problem: Acute on chronic diastolic (congestive) heart failure.  Plan: continue furosemide 40 IV for today.         89 year male with HTN hyperlipidemia CAD  + stress test HFpEF, recent hosptial admit with pneumonia and CHF,  now placed in isolation->fall with bruise-> sent to outside hospital again placed in isolation, now post fall with injuury while attempting to shower. Patient also may have had an infarct ,date unknown .    patient improved post furosemide IV      : Myocardial infarction, subendocardial.  Plan: increase imdur to 120. change furosemide to 40 mg po BID. continue metoprolol 100 XL daily.   : Closed fracture of multiple ribs of right side, initial encounter.  Plan: physical therapy continuing.    Pacemaker.  Plan: pacer fx normally, hematuria ?  hold Eliquis . IF no further hematuria- > reinitiate this.   : Acute on chronic diastolic (congestive) heart failure.  Plan: continue furosemide 40 IV for today.         89 year male with HTN hyperlipidemia CAD  + stress test HFpEF, recent hosptial admit with pneumonia and CHF,  now placed in isolation->fall with bruise-> sent to outside hospital again placed in isolation, now post fall with injuury while attempting to shower. Patient also may have had an infarct ,date unknown .    patient improved post furosemide IV      : Myocardial infarction, subendocardial.  Plan: increase imdur to 120. change furosemide to 40 mg po BID. continue metoprolol 100 XL daily.   : Closed fracture of multiple ribs of right side, initial encounter.  Plan: physical therapy continuing.    P  : Acute on chronic diastolic (congestive) heart failure.  Plan: continue furosemide 40 IV for today change to oral Lasix 40mg twice daily     89 year male with HTN hyperlipidemia CAD  + stress test HFpEF, recent hosptial admit with pneumonia and CHF,  now placed in isolation->fall with bruise-> sent to outside hospital again placed in isolation, now post fall with injuury while attempting to shower. Patient also may have had an infarct ,date unknown .    patient improved post furosemide IV      : Myocardial infarction, subendocardial.  Plan: increase imdur to 120. change furosemide to 40 mg po BID. continue metoprolol 100 XL daily.   : Closed fracture of multiple ribs of right side, initial encounter.  Plan: physical therapy continuing.   : Acute on chronic diastolic (congestive) heart failure.  Plan: continue furosemide 40 IV for today change to oral Lasix 40mg twice daily     89 year male with HTN hyperlipidemia CAD  + stress test HFpEF, recent hosptial admit with pneumonia and CHF,  now placed in isolation->fall with bruise-> sent to outside hospital again placed in isolation, now post fall with injuury while attempting to shower. Patient also may have had an infarct ,date unknown .    patient improved post furosemide IV     Myocardial infarction, subendocardial.  Plan: increase imdur to 120. change furosemide to 40 mg po BID. continue metoprolol 100 XL daily.    Closed fracture of multiple ribs of right side, initial encounter.  Plan: physical therapy continuing.    Acute on chronic diastolic (congestive) heart failure.  Plan: continue furosemide 40 IV for today change to oral Lasix 40mg twice daily    As per Dr Padgett and Dr Sanchez patient medically stable for discharge

## 2020-12-15 VITALS
HEART RATE: 60 BPM | RESPIRATION RATE: 18 BRPM | OXYGEN SATURATION: 97 % | SYSTOLIC BLOOD PRESSURE: 119 MMHG | TEMPERATURE: 97 F | DIASTOLIC BLOOD PRESSURE: 67 MMHG

## 2020-12-15 LAB — SARS-COV-2 RNA SPEC QL NAA+PROBE: SIGNIFICANT CHANGE UP

## 2020-12-15 RX ADMIN — APIXABAN 2.5 MILLIGRAM(S): 2.5 TABLET, FILM COATED ORAL at 17:26

## 2020-12-15 RX ADMIN — Medication 100 MILLIGRAM(S): at 06:08

## 2020-12-15 RX ADMIN — Medication 50 MICROGRAM(S): at 06:08

## 2020-12-15 RX ADMIN — Medication 81 MILLIGRAM(S): at 13:59

## 2020-12-15 RX ADMIN — ISOSORBIDE MONONITRATE 120 MILLIGRAM(S): 60 TABLET, EXTENDED RELEASE ORAL at 14:00

## 2020-12-15 RX ADMIN — Medication 40 MILLIGRAM(S): at 17:26

## 2020-12-15 RX ADMIN — Medication 40 MILLIGRAM(S): at 06:08

## 2020-12-15 RX ADMIN — FINASTERIDE 5 MILLIGRAM(S): 5 TABLET, FILM COATED ORAL at 13:59

## 2020-12-15 RX ADMIN — APIXABAN 2.5 MILLIGRAM(S): 2.5 TABLET, FILM COATED ORAL at 06:08

## 2020-12-15 NOTE — PROGRESS NOTE ADULT - ASSESSMENT
89 m with    Syncope / s/ p MI  - continue Rx  - cardiology follow Dr. Padgett      CHF cr systolic   - Patient with right pleural effusion  - continue Lasix 40mg   - Cardiology follow    CAD  - medical Rx  - cardiology follow     Hypothyroidism (acquired).  - c/w synthroid.   - Follow TSH    Hypertension.  - control    Hyperlipidemia.   - c/w atorvastatin.    BPH (benign prostatic hyperplasia).   - c/w finasteride.     Leg edema.  - b/l LE edema, likely 2/2 to heart failure  - [facility] duplex no DVT     H/O cardiac arrhythmia.    - ? hx of arrhythmia s/p PPM  - pacemaker interrogation  - continue Eliquis but need to d/w cardiology the risk of falling and AC   - aspirin.     Ribs fractures  - pain control  - incentive spirometry    Prophylactic measure.  - Eliquis.     PT    DC adult home. Follow with Cardiology in 3-4 days. QA    Kofi Sanchez MD pager [phone]

## 2020-12-15 NOTE — CHART NOTE - NSCHARTNOTEFT_GEN_A_CORE
Patient is a 89y old  Male who presents with a chief complaint of fall (12 Dec 2020 19:51)      Vital Signs Last 24 Hrs  T(C): 36.2 (15 Dec 2020 11:53), Max: 36.4 (14 Dec 2020 20:37)  T(F): 97.2 (15 Dec 2020 11:53), Max: 97.5 (14 Dec 2020 20:37)  HR: 60 (15 Dec 2020 11:53) (60 - 62)  BP: 119/67 (15 Dec 2020 11:53) (119/67 - 160/77)  BP(mean): --  RR: 18 (15 Dec 2020 11:53) (18 - 18)  SpO2: 97% (15 Dec 2020 11:53) (96% - 97%)      Labs:                          11.7   8.75  )-----------( 340      ( 14 Dec 2020 06:57 )             37.5     12-14    137  |  100  |  33<H>  ----------------------------<  89  3.9   |  24  |  1.79<H>    Ca    8.9      14 Dec 2020 06:57  Phos  3.0     12-14  Mg     2.2     12-14              Radiology:    Physical Exam:  General: WN/WD NAD  Neurology: A&Ox3, nonfocal, BOLAND x 4  Head:  Normocephalic, atraumatic  Respiratory: CTA B/L  CV: RRR, S1S2, no murmur  Abdominal: Soft, NT, ND no palpable mass  MSK: No edema, + peripheral pulses, FROM all 4 extremity    Discharge Note and Plan: Discussed with Dr. Sanchez patient medically stable for discharge   Follow up with Dr Padgett outpatient   >  >  > Patient is a 89y old  Male who presents with a chief complaint of fall (12 Dec 2020 19:51)      Vital Signs Last 24 Hrs  T(C): 36.2 (15 Dec 2020 11:53), Max: 36.4 (14 Dec 2020 20:37)  T(F): 97.2 (15 Dec 2020 11:53), Max: 97.5 (14 Dec 2020 20:37)  HR: 60 (15 Dec 2020 11:53) (60 - 62)  BP: 119/67 (15 Dec 2020 11:53) (119/67 - 160/77)  BP(mean): --  RR: 18 (15 Dec 2020 11:53) (18 - 18)  SpO2: 97% (15 Dec 2020 11:53) (96% - 97%)      Labs:                          11.7   8.75  )-----------( 340      ( 14 Dec 2020 06:57 )             37.5     12-14    137  |  100  |  33<H>  ----------------------------<  89  3.9   |  24  |  1.79<H>    Ca    8.9      14 Dec 2020 06:57  Phos  3.0     12-14  Mg     2.2     12-14              Radiology:    Physical Exam:  General: WN/WD NAD  Neurology: A&Ox3, nonfocal, BOLAND x 4  Head:  Normocephalic, atraumatic  Respiratory: CTA B/L  CV: RRR, S1S2, no murmur  Abdominal: Soft, NT, ND no palpable mass  MSK: No edema, + peripheral pulses, FROM all 4 extremity    Discharge Note and Plan: Discussed with Dr. Sanchez patient medically stable for discharge   Follow up with Dr Padgett outpatient   cleared with Dr. carol roberts with Eliquis once patient environment safe at assisted living supervision maintain   >  >  >

## 2020-12-15 NOTE — PROGRESS NOTE ADULT - SUBJECTIVE AND OBJECTIVE BOX
Patient is a 89y old  Male who presents with a chief complaint of fall (12 Dec 2020 19:51)      SUBJECTIVE / OVERNIGHT EVENTS: Comfortable without new complaints.   Review of Systems  chest pain no  palpitations no  sob no  nausea no  headache no    MEDICATIONS  (STANDING):  apixaban 2.5 milliGRAM(s) Oral every 12 hours  aspirin enteric coated 81 milliGRAM(s) Oral daily  atorvastatin 20 milliGRAM(s) Oral at bedtime  finasteride 5 milliGRAM(s) Oral daily  furosemide    Tablet 40 milliGRAM(s) Oral every 12 hours  isosorbide   mononitrate ER Tablet (IMDUR) 120 milliGRAM(s) Oral daily  levothyroxine 50 MICROGram(s) Oral daily  metoprolol succinate  milliGRAM(s) Oral daily    MEDICATIONS  (PRN):  acetaminophen   Tablet .. 650 milliGRAM(s) Oral every 6 hours PRN Temp greater or equal to 38.5C (101.3F), Mild Pain (1 - 3)      Vital Signs Last 24 Hrs  T(C): 36.2 (15 Dec 2020 11:53), Max: 36.4 (14 Dec 2020 20:37)  T(F): 97.2 (15 Dec 2020 11:53), Max: 97.5 (14 Dec 2020 20:37)  HR: 60 (15 Dec 2020 11:53) (60 - 62)  BP: 119/67 (15 Dec 2020 11:53) (119/67 - 160/77)  BP(mean): --  RR: 18 (15 Dec 2020 11:53) (18 - 18)  SpO2: 97% (15 Dec 2020 11:53) (96% - 97%)    PHYSICAL EXAM:  GENERAL: NAD, well-developed  HEAD:  Atraumatic, Normocephalic  EYES: EOMI, PERRLA, conjunctiva and sclera clear  NECK: Supple, No JVD  CHEST/LUNG: Clear to auscultation bilaterally; No wheeze R sided ecchymosis and hematoma.   HEART: Regular rate and rhythm; No murmurs, rubs, or gallops  ABDOMEN: Soft, Nontender, Nondistended; Bowel sounds present  EXTREMITIES:  2+ Peripheral Pulses, No clubbing, cyanosis, or edema  PSYCH: AAOx3  NEUROLOGY: non-focal  SKIN: No rashes or lesions    LABS:                        11.7   8.75  )-----------( 340      ( 14 Dec 2020 06:57 )             37.5     12-14    137  |  100  |  33<H>  ----------------------------<  89  3.9   |  24  |  1.79<H>    Ca    8.9      14 Dec 2020 06:57  Phos  3.0     12-14  Mg     2.2     12-14                  RADIOLOGY & ADDITIONAL TESTS:    Imaging Personally Reviewed:    Consultant(s) Notes Reviewed:      Care Discussed with Consultants/Other Providers:

## 2020-12-28 PROCEDURE — 85025 COMPLETE CBC W/AUTO DIFF WBC: CPT

## 2020-12-28 PROCEDURE — 80053 COMPREHEN METABOLIC PANEL: CPT

## 2020-12-28 PROCEDURE — 80076 HEPATIC FUNCTION PANEL: CPT

## 2020-12-28 PROCEDURE — 71260 CT THORAX DX C+: CPT

## 2020-12-28 PROCEDURE — 93005 ELECTROCARDIOGRAM TRACING: CPT

## 2020-12-28 PROCEDURE — 99285 EMERGENCY DEPT VISIT HI MDM: CPT

## 2020-12-28 PROCEDURE — 83735 ASSAY OF MAGNESIUM: CPT

## 2020-12-28 PROCEDURE — 81001 URINALYSIS AUTO W/SCOPE: CPT

## 2020-12-28 PROCEDURE — 70450 CT HEAD/BRAIN W/O DYE: CPT

## 2020-12-28 PROCEDURE — 74177 CT ABD & PELVIS W/CONTRAST: CPT

## 2020-12-28 PROCEDURE — 80048 BASIC METABOLIC PNL TOTAL CA: CPT

## 2020-12-28 PROCEDURE — 83690 ASSAY OF LIPASE: CPT

## 2020-12-28 PROCEDURE — 87086 URINE CULTURE/COLONY COUNT: CPT

## 2020-12-28 PROCEDURE — 84100 ASSAY OF PHOSPHORUS: CPT

## 2020-12-28 PROCEDURE — 82550 ASSAY OF CK (CPK): CPT

## 2020-12-28 PROCEDURE — 97116 GAIT TRAINING THERAPY: CPT

## 2020-12-28 PROCEDURE — U0003: CPT

## 2020-12-28 PROCEDURE — 86769 SARS-COV-2 COVID-19 ANTIBODY: CPT

## 2020-12-28 PROCEDURE — 82553 CREATINE MB FRACTION: CPT

## 2020-12-28 PROCEDURE — 97530 THERAPEUTIC ACTIVITIES: CPT

## 2020-12-28 PROCEDURE — 84484 ASSAY OF TROPONIN QUANT: CPT

## 2020-12-28 PROCEDURE — 71045 X-RAY EXAM CHEST 1 VIEW: CPT

## 2020-12-28 PROCEDURE — 85027 COMPLETE CBC AUTOMATED: CPT

## 2020-12-28 PROCEDURE — 97161 PT EVAL LOW COMPLEX 20 MIN: CPT

## 2021-01-30 ENCOUNTER — INPATIENT (INPATIENT)
Facility: HOSPITAL | Age: 86
LOS: 5 days | Discharge: NURSING, REHAB OR LONG-TERM CARE | DRG: 291 | End: 2021-02-05
Attending: INTERNAL MEDICINE | Admitting: INTERNAL MEDICINE
Payer: MEDICARE

## 2021-01-30 VITALS
DIASTOLIC BLOOD PRESSURE: 82 MMHG | OXYGEN SATURATION: 97 % | RESPIRATION RATE: 18 BRPM | HEART RATE: 57 BPM | WEIGHT: 145.06 LBS | HEIGHT: 66 IN | SYSTOLIC BLOOD PRESSURE: 134 MMHG | TEMPERATURE: 98 F

## 2021-01-30 DIAGNOSIS — N40.0 BENIGN PROSTATIC HYPERPLASIA WITHOUT LOWER URINARY TRACT SYMPTOMS: ICD-10-CM

## 2021-01-30 DIAGNOSIS — I50.9 HEART FAILURE, UNSPECIFIED: ICD-10-CM

## 2021-01-30 DIAGNOSIS — I48.11 LONGSTANDING PERSISTENT ATRIAL FIBRILLATION: ICD-10-CM

## 2021-01-30 DIAGNOSIS — I10 ESSENTIAL (PRIMARY) HYPERTENSION: ICD-10-CM

## 2021-01-30 DIAGNOSIS — Z29.9 ENCOUNTER FOR PROPHYLACTIC MEASURES, UNSPECIFIED: ICD-10-CM

## 2021-01-30 DIAGNOSIS — N17.9 ACUTE KIDNEY FAILURE, UNSPECIFIED: ICD-10-CM

## 2021-01-30 DIAGNOSIS — I50.33 ACUTE ON CHRONIC DIASTOLIC (CONGESTIVE) HEART FAILURE: ICD-10-CM

## 2021-01-30 DIAGNOSIS — Z90.89 ACQUIRED ABSENCE OF OTHER ORGANS: Chronic | ICD-10-CM

## 2021-01-30 LAB
ALBUMIN SERPL ELPH-MCNC: 3.4 G/DL — SIGNIFICANT CHANGE UP (ref 3.3–5)
ALP SERPL-CCNC: 106 U/L — SIGNIFICANT CHANGE UP (ref 40–120)
ALT FLD-CCNC: 16 U/L — SIGNIFICANT CHANGE UP (ref 10–45)
ANION GAP SERPL CALC-SCNC: 14 MMOL/L — SIGNIFICANT CHANGE UP (ref 5–17)
APTT BLD: 32.1 SEC — SIGNIFICANT CHANGE UP (ref 27.5–35.5)
AST SERPL-CCNC: 28 U/L — SIGNIFICANT CHANGE UP (ref 10–40)
BASOPHILS # BLD AUTO: 0.09 K/UL — SIGNIFICANT CHANGE UP (ref 0–0.2)
BASOPHILS NFR BLD AUTO: 1.2 % — SIGNIFICANT CHANGE UP (ref 0–2)
BILIRUB SERPL-MCNC: 0.4 MG/DL — SIGNIFICANT CHANGE UP (ref 0.2–1.2)
BUN SERPL-MCNC: 53 MG/DL — HIGH (ref 7–23)
CALCIUM SERPL-MCNC: 8.7 MG/DL — SIGNIFICANT CHANGE UP (ref 8.4–10.5)
CHLORIDE SERPL-SCNC: 97 MMOL/L — SIGNIFICANT CHANGE UP (ref 96–108)
CK MB BLD-MCNC: 4.5 % — HIGH (ref 0–3.5)
CK MB CFR SERPL CALC: 6.5 NG/ML — SIGNIFICANT CHANGE UP (ref 0–6.7)
CK SERPL-CCNC: 144 U/L — SIGNIFICANT CHANGE UP (ref 30–200)
CO2 SERPL-SCNC: 24 MMOL/L — SIGNIFICANT CHANGE UP (ref 22–31)
CREAT SERPL-MCNC: 2.22 MG/DL — HIGH (ref 0.5–1.3)
EOSINOPHIL # BLD AUTO: 0.31 K/UL — SIGNIFICANT CHANGE UP (ref 0–0.5)
EOSINOPHIL NFR BLD AUTO: 4 % — SIGNIFICANT CHANGE UP (ref 0–6)
GLUCOSE SERPL-MCNC: 89 MG/DL — SIGNIFICANT CHANGE UP (ref 70–99)
HCT VFR BLD CALC: 36.9 % — LOW (ref 39–50)
HGB BLD-MCNC: 11.5 G/DL — LOW (ref 13–17)
IMM GRANULOCYTES NFR BLD AUTO: 0.3 % — SIGNIFICANT CHANGE UP (ref 0–1.5)
INR BLD: 1.24 RATIO — HIGH (ref 0.88–1.16)
LYMPHOCYTES # BLD AUTO: 1 K/UL — SIGNIFICANT CHANGE UP (ref 1–3.3)
LYMPHOCYTES # BLD AUTO: 12.8 % — LOW (ref 13–44)
MAGNESIUM SERPL-MCNC: 2.8 MG/DL — HIGH (ref 1.6–2.6)
MCHC RBC-ENTMCNC: 26.9 PG — LOW (ref 27–34)
MCHC RBC-ENTMCNC: 31.2 GM/DL — LOW (ref 32–36)
MCV RBC AUTO: 86.2 FL — SIGNIFICANT CHANGE UP (ref 80–100)
MONOCYTES # BLD AUTO: 1.14 K/UL — HIGH (ref 0–0.9)
MONOCYTES NFR BLD AUTO: 14.6 % — HIGH (ref 2–14)
NEUTROPHILS # BLD AUTO: 5.23 K/UL — SIGNIFICANT CHANGE UP (ref 1.8–7.4)
NEUTROPHILS NFR BLD AUTO: 67.1 % — SIGNIFICANT CHANGE UP (ref 43–77)
NRBC # BLD: 0 /100 WBCS — SIGNIFICANT CHANGE UP (ref 0–0)
NT-PROBNP SERPL-SCNC: 6445 PG/ML — HIGH (ref 0–300)
PLATELET # BLD AUTO: 350 K/UL — SIGNIFICANT CHANGE UP (ref 150–400)
POTASSIUM SERPL-MCNC: 4.3 MMOL/L — SIGNIFICANT CHANGE UP (ref 3.5–5.3)
POTASSIUM SERPL-SCNC: 4.3 MMOL/L — SIGNIFICANT CHANGE UP (ref 3.5–5.3)
PROT SERPL-MCNC: 7 G/DL — SIGNIFICANT CHANGE UP (ref 6–8.3)
PROTHROM AB SERPL-ACNC: 14.7 SEC — HIGH (ref 10.6–13.6)
RBC # BLD: 4.28 M/UL — SIGNIFICANT CHANGE UP (ref 4.2–5.8)
RBC # FLD: 19.1 % — HIGH (ref 10.3–14.5)
SARS-COV-2 RNA SPEC QL NAA+PROBE: SIGNIFICANT CHANGE UP
SODIUM SERPL-SCNC: 135 MMOL/L — SIGNIFICANT CHANGE UP (ref 135–145)
TROPONIN T, HIGH SENSITIVITY RESULT: 109 NG/L — HIGH (ref 0–51)
TROPONIN T, HIGH SENSITIVITY RESULT: 114 NG/L — HIGH (ref 0–51)
WBC # BLD: 7.79 K/UL — SIGNIFICANT CHANGE UP (ref 3.8–10.5)
WBC # FLD AUTO: 7.79 K/UL — SIGNIFICANT CHANGE UP (ref 3.8–10.5)

## 2021-01-30 PROCEDURE — 71250 CT THORAX DX C-: CPT | Mod: 26

## 2021-01-30 PROCEDURE — 93010 ELECTROCARDIOGRAM REPORT: CPT

## 2021-01-30 PROCEDURE — 71045 X-RAY EXAM CHEST 1 VIEW: CPT | Mod: 26

## 2021-01-30 PROCEDURE — 99223 1ST HOSP IP/OBS HIGH 75: CPT

## 2021-01-30 PROCEDURE — 99285 EMERGENCY DEPT VISIT HI MDM: CPT

## 2021-01-30 RX ORDER — ISOSORBIDE MONONITRATE 60 MG/1
30 TABLET, EXTENDED RELEASE ORAL DAILY
Refills: 0 | Status: DISCONTINUED | OUTPATIENT
Start: 2021-01-30 | End: 2021-02-05

## 2021-01-30 RX ORDER — ATORVASTATIN CALCIUM 80 MG/1
20 TABLET, FILM COATED ORAL AT BEDTIME
Refills: 0 | Status: DISCONTINUED | OUTPATIENT
Start: 2021-01-30 | End: 2021-02-05

## 2021-01-30 RX ORDER — FUROSEMIDE 40 MG
40 TABLET ORAL DAILY
Refills: 0 | Status: DISCONTINUED | OUTPATIENT
Start: 2021-01-31 | End: 2021-01-31

## 2021-01-30 RX ORDER — ASPIRIN/CALCIUM CARB/MAGNESIUM 324 MG
325 TABLET ORAL ONCE
Refills: 0 | Status: COMPLETED | OUTPATIENT
Start: 2021-01-30 | End: 2021-01-30

## 2021-01-30 RX ORDER — FINASTERIDE 5 MG/1
5 TABLET, FILM COATED ORAL DAILY
Refills: 0 | Status: DISCONTINUED | OUTPATIENT
Start: 2021-01-30 | End: 2021-02-05

## 2021-01-30 RX ORDER — FUROSEMIDE 40 MG
40 TABLET ORAL ONCE
Refills: 0 | Status: COMPLETED | OUTPATIENT
Start: 2021-01-30 | End: 2021-01-30

## 2021-01-30 RX ORDER — LEVOTHYROXINE SODIUM 125 MCG
50 TABLET ORAL DAILY
Refills: 0 | Status: DISCONTINUED | OUTPATIENT
Start: 2021-01-30 | End: 2021-02-05

## 2021-01-30 RX ORDER — APIXABAN 2.5 MG/1
2.5 TABLET, FILM COATED ORAL
Refills: 0 | Status: DISCONTINUED | OUTPATIENT
Start: 2021-01-30 | End: 2021-01-30

## 2021-01-30 RX ORDER — ASPIRIN/CALCIUM CARB/MAGNESIUM 324 MG
81 TABLET ORAL DAILY
Refills: 0 | Status: DISCONTINUED | OUTPATIENT
Start: 2021-01-30 | End: 2021-02-05

## 2021-01-30 RX ORDER — METOPROLOL TARTRATE 50 MG
50 TABLET ORAL
Refills: 0 | Status: DISCONTINUED | OUTPATIENT
Start: 2021-01-30 | End: 2021-02-05

## 2021-01-30 RX ADMIN — Medication 40 MILLIGRAM(S): at 17:56

## 2021-01-30 RX ADMIN — Medication 325 MILLIGRAM(S): at 20:40

## 2021-01-30 NOTE — ED PROVIDER NOTE - NS ED ROS FT
CONSTITUTIONAL: No weakness, fevers or chills  EYES/ENT: No visual changes;  No vertigo or throat pain   NECK: No pain or stiffness  RESPIRATORY: No cough, wheezing, hemoptysis; No shortness of breath  CARDIOVASCULAR: No chest pain or palpitations  GASTROINTESTINAL: No abdominal or epigastric pain. No nausea, vomiting, or hematemesis; No diarrhea or constipation. No melena or hematochezia.  GENITOURINARY: No dysuria, frequency or hematuria  NEUROLOGICAL: No numbness or weakness  SKIN: No itching, rashes, +LE swelling

## 2021-01-30 NOTE — H&P ADULT - NSHPLABSRESULTS_GEN_ALL_CORE
Personally reviewed available labs, imaging and ekg  CBC Full  -  ( 30 Jan 2021 19:07 )  WBC Count : 7.79 K/uL  RBC Count : 4.28 M/uL  Hemoglobin : 11.5 g/dL  Hematocrit : 36.9 %  Platelet Count - Automated : 350 K/uL  Mean Cell Volume : 86.2 fl  Mean Cell Hemoglobin : 26.9 pg  Mean Cell Hemoglobin Concentration : 31.2 gm/dL  Auto Neutrophil # : 5.23 K/uL  Auto Lymphocyte # : 1.00 K/uL  Auto Monocyte # : 1.14 K/uL  Auto Eosinophil # : 0.31 K/uL  Auto Basophil # : 0.09 K/uL  Auto Neutrophil % : 67.1 %  Auto Lymphocyte % : 12.8 %  Auto Monocyte % : 14.6 %  Auto Eosinophil % : 4.0 %  Auto Basophil % : 1.2 %    01-30  135  |  97  |  53<H>  ----------------------------<  89  4.3   |  24  |  2.22<H>  Ca    8.7      30 Jan 2021 19:07  Mg     2.8     01-30  TPro  7.0  /  Alb  3.4  /  TBili  0.4  /  DBili  x   /  AST  28  /  ALT  16  /  AlkPhos  106  01-30  PT/INR - ( 30 Jan 2021 19:07 )   PT: 14.7 sec;   INR: 1.24 ratio    PTT - ( 30 Jan 2021 19:07 )  PTT:32.1 sec  Troponin T, High Sensitivity Result: 109: (01.30.21 @ 19:07)  Troponin T, High Sensitivity Result: 112:  (12.11.20 @ 18:11)    Imaging:  CXR demonstrates b/l hazy opacification, CT chest appears to have b/l pleural effusion R>L  EKG: V-paced 60

## 2021-01-30 NOTE — H&P ADULT - NSHPPHYSICALEXAM_GEN_ALL_CORE
Vital Signs Last 24 Hrs  T(C): 36.7 (30 Jan 2021 19:24), Max: 36.7 (30 Jan 2021 19:24)  T(F): 98 (30 Jan 2021 19:24), Max: 98 (30 Jan 2021 19:24)  HR: 60 (30 Jan 2021 23:16) (57 - 60)  BP: 144/75 (30 Jan 2021 23:16) (123/60 - 144/75)  RR: 18 (30 Jan 2021 23:16) (18 - 18)  SpO2: 96% (30 Jan 2021 23:16) (96% - 98%) on RA    GENERAL: NAD, well-developed  HEAD:  Atraumatic, Normocephalic  EYES: EOMI, PERRL, conjunctiva and sclera clear  Mouth: MMM, no lesions  NECK: Supple, no appreciable masses  Lung: normal work of breathing, cta b/l  Chest: S1&S2+, rrr, no m/r/g appreciated  ABDOMEN: bs+, soft, nt, nd, no appreciable masses  : No de la torre catheter, no CVA tenderness  EXTREMITIES:  radial pulse present b/l, PT present b/l, b/l pitting edema just above knee  Neuro: A&Ox3, no flaccid paralysis in extremities appreciated  SKIN: warm and dry, no visible purulence in exposed areas

## 2021-01-30 NOTE — ED PROVIDER NOTE - PROGRESS NOTE DETAILS
Mariluz Grajeda PGY-1: Pt signed out to me pending labs. Admit to inna for CHF exacerbation. Mariluz Grajeda PGY-1: Troponin 109. Mariluz Grajeda PGY-1: Re-evaluated patient. No new complaints. VSS. Admitted.

## 2021-01-30 NOTE — H&P ADULT - NSICDXFAMILYHX_GEN_ALL_CORE_FT
FAMILY HISTORY:  Father  Still living? Unknown  FH: heart disease, Age at diagnosis: Age Unknown

## 2021-01-30 NOTE — H&P ADULT - HISTORY OF PRESENT ILLNESS
89M w/ HFpEF, CAD, Afib on eliquis, SSS s/p PPM, CKD4, HTN presents for evaluation of b/l LE swelling. The patient reports that he was sent to the hospital by his cardiologist for lower extremity swelling. He denies fever, chills, CP, palpitations, sob, orthopnea, awakening from sleep due to sob, n/v/d, or painful urination. per chart review he had recent hospitalization Dec2020 for syncope w. HFpEF and charted MI (unclear if this is actually chronic myocardial injury vs acute infarct).    In the ED, VS 97.9, 134/82, 57, 18 97%RA  s/p asa [id number], lasix 40mg IVx1

## 2021-01-30 NOTE — ED PROVIDER NOTE - ATTENDING CONTRIBUTION TO CARE
89 M here for "heart problems" hx of CHF, afib on eliquis,  hypothyroidism here w/ edema,   concern for chf exacerbation 89 M here for "heart problems" hx of CHF, afib on eliquis,  hypothyroidism here w/ edema,   clear lungs w/ bilateral  lower extremity edema and scrotal and penile edema, aaox3, no abdominal tenderness, no cva tenderness,   concern for chf exacerbation will give iv diuresis and admit to medicine

## 2021-01-30 NOTE — ED ADULT NURSE NOTE - NSIMPLEMENTINTERV_GEN_ALL_ED
Implemented All Fall with Harm Risk Interventions:  [place] to call system. Call bell, personal items and telephone within reach. Instruct patient to call for assistance. Room bathroom lighting operational. Non-slip footwear when patient is off stretcher. Physically safe environment: no spills, clutter or unnecessary equipment. Stretcher in lowest position, wheels locked, appropriate side rails in place. Provide visual cue, wrist band, yellow gown, etc. Monitor gait and stability. Monitor for mental status changes and reorient to person, place, and time. Review medications for side effects contributing to fall risk. Reinforce activity limits and safety measures with patient and family. Provide visual clues: red socks.

## 2021-01-30 NOTE — H&P ADULT - ASSESSMENT
89M w/ HFpEF, CAD, Afib on eliquis, SSS s/p PPM, CKD4, HTN presents for evaluation of b/l LE swelling admit to medicine for acute on chronic HFpEF.

## 2021-01-30 NOTE — H&P ADULT - PROBLEM SELECTOR PLAN 2
bladder scan and if needed straight cath  renal us  - maybe associated with heart failure, need to r/o obstructive path

## 2021-01-30 NOTE — ED ADULT NURSE NOTE - TOBACCO USE
Prior auth for MRI brain denied    Case# [id number]    A brain MRI must meet CHALO guidelines. [place] guidelines state that Brain MRI must be needed due to a medical problem.  Based on the information given (right hand numbness), the study requested does not meet Never smoker

## 2021-01-30 NOTE — ED PROVIDER NOTE - PMH
BPH (benign prostatic hyperplasia)    H/O cardiac arrhythmia    Hyperlipidemia    Hypertension    Hypothyroidism (acquired)    Pacemaker

## 2021-01-30 NOTE — ED PROVIDER NOTE - OBJECTIVE STATEMENT
89 M with HTN, HLD, CAD, Afib on eliquis, sick sinus syndrome s/p [facility] PPM, HFpEF was sent in from nursing per cardiologist's request (Dr. Shanell Padgett) for increased LE swelling. Apparently was having increased dyspnea at nursing home and was recently changed from lasix to po bumex. Patient denies any other complaints. Reports no CP, no SOB (saturating 95% on RA), no cough or fevers. Denies weight gain.

## 2021-01-30 NOTE — ED PROVIDER NOTE - PHYSICAL EXAMINATION
GENERAL: NAD, well-developed, very pleasant elderly man  HEAD:  Atraumatic, Normocephalic  EYES: EOMI, conjunctiva and sclera clear  NECK: Supple, No JVD  CHEST/LUNG: Clear to auscultation bilaterally; No wheeze, ronchi or rales, no increased WOB  HEART: Regular rate and rhythm; No murmurs, rubs, or gallops  ABDOMEN: Soft, Nontender, Nondistended; Bowel sounds present  EXTREMITIES:  2+ Peripheral Pulses, No clubbing, cyanosis, 3+ pitting edema b/l with erythema  PSYCH: AAOx2-3  NEUROLOGY: non-focal  SKIN: erythema and xerosis of LEs

## 2021-01-30 NOTE — ED ADULT NURSE NOTE - OBJECTIVE STATEMENT
Pt is a 89 yo M who was brought to the Ed from Nursing Home via EMS c/o BLE edema and sob. Pt sent by his cardiologist for evaluation. Pt A/O x3, denies cp/palpitations, no cough, no abd pain n/v/d, no fever/chills. +2 BLE edema, legs red, no warmth, + 2 edema left mid to upper arm. + pulses.

## 2021-01-30 NOTE — H&P ADULT - NSHPREVIEWOFSYSTEMS_GEN_ALL_CORE
CONSTITUTIONAL: No fever, no chills  EYES: No eye pain, no acute blindness  Mouth: no pain in mouth, no cuts  RESPIRATORY: No cough, No sob  CARDIOVASCULAR: No CP, no palpitations  GASTROINTESTINAL: no abdominal pain, no n/v/d  GENITOURINARY: No dysuria, no hematuria  Heme: No easy bruising, no swelling of neck  NEUROLOGICAL: No seizure, No acute paralysis  SKIN: No itching, no rashes  MUSCULOSKELETAL: No acute joint pain, no joint swelling, b/l leg swelling

## 2021-01-30 NOTE — ED PROVIDER NOTE - CLINICAL SUMMARY MEDICAL DECISION MAKING FREE TEXT BOX
89 M with HTN, HLD, CAD, Afib on eliquis, sick sinus syndrome s/p [facility] PPM, HFpEF presenting for increased LE edema, recently changed from lasix to bumex in nursing home. Apparently also was dyspneic there. In ED, saturating well on RA, notable LE edema. Will treat with lasix 40 mg IVx1 and f/u CBC, CMP, BNP, CXR.

## 2021-01-30 NOTE — H&P ADULT - ATTENDING COMMENTS
Patient assigned to me by night hospitalist in charge for management and care for patient for this evening only. Care to be resumed by day hospitalist Dr. Sanchez at 08:00 in the morning and thereafter.     Jp Aleman MD  Medicine Attending  Department of Hospital Medicine  pager: [phone] (available from 20:00 to 08:00)

## 2021-01-31 DIAGNOSIS — E03.9 HYPOTHYROIDISM, UNSPECIFIED: ICD-10-CM

## 2021-01-31 DIAGNOSIS — I25.10 ATHEROSCLEROTIC HEART DISEASE OF NATIVE CORONARY ARTERY WITHOUT ANGINA PECTORIS: ICD-10-CM

## 2021-01-31 PROBLEM — Z95.0 PRESENCE OF CARDIAC PACEMAKER: Chronic | Status: ACTIVE | Noted: 2020-12-11

## 2021-01-31 LAB
ANION GAP SERPL CALC-SCNC: 18 MMOL/L — HIGH (ref 5–17)
BUN SERPL-MCNC: 53 MG/DL — HIGH (ref 7–23)
CALCIUM SERPL-MCNC: 8.8 MG/DL — SIGNIFICANT CHANGE UP (ref 8.4–10.5)
CHLORIDE SERPL-SCNC: 98 MMOL/L — SIGNIFICANT CHANGE UP (ref 96–108)
CO2 SERPL-SCNC: 20 MMOL/L — LOW (ref 22–31)
CREAT SERPL-MCNC: 2.06 MG/DL — HIGH (ref 0.5–1.3)
GLUCOSE SERPL-MCNC: 75 MG/DL — SIGNIFICANT CHANGE UP (ref 70–99)
HCT VFR BLD CALC: 38.5 % — LOW (ref 39–50)
HGB BLD-MCNC: 11.9 G/DL — LOW (ref 13–17)
MAGNESIUM SERPL-MCNC: 2.5 MG/DL — SIGNIFICANT CHANGE UP (ref 1.6–2.6)
MCHC RBC-ENTMCNC: 26.9 PG — LOW (ref 27–34)
MCHC RBC-ENTMCNC: 30.9 GM/DL — LOW (ref 32–36)
MCV RBC AUTO: 87.1 FL — SIGNIFICANT CHANGE UP (ref 80–100)
NRBC # BLD: 0 /100 WBCS — SIGNIFICANT CHANGE UP (ref 0–0)
PLATELET # BLD AUTO: 352 K/UL — SIGNIFICANT CHANGE UP (ref 150–400)
POTASSIUM SERPL-MCNC: 4.1 MMOL/L — SIGNIFICANT CHANGE UP (ref 3.5–5.3)
POTASSIUM SERPL-SCNC: 4.1 MMOL/L — SIGNIFICANT CHANGE UP (ref 3.5–5.3)
RBC # BLD: 4.42 M/UL — SIGNIFICANT CHANGE UP (ref 4.2–5.8)
RBC # FLD: 19 % — HIGH (ref 10.3–14.5)
SARS-COV-2 IGG SERPL QL IA: NEGATIVE — SIGNIFICANT CHANGE UP
SARS-COV-2 IGM SERPL IA-ACNC: <0.1 INDEX — SIGNIFICANT CHANGE UP
SODIUM SERPL-SCNC: 136 MMOL/L — SIGNIFICANT CHANGE UP (ref 135–145)
TROPONIN T, HIGH SENSITIVITY RESULT: 109 NG/L — HIGH (ref 0–51)
WBC # BLD: 6.97 K/UL — SIGNIFICANT CHANGE UP (ref 3.8–10.5)
WBC # FLD AUTO: 6.97 K/UL — SIGNIFICANT CHANGE UP (ref 3.8–10.5)

## 2021-01-31 RX ORDER — FUROSEMIDE 40 MG
40 TABLET ORAL EVERY 12 HOURS
Refills: 0 | Status: DISCONTINUED | OUTPATIENT
Start: 2021-01-31 | End: 2021-02-01

## 2021-01-31 RX ADMIN — Medication 50 MICROGRAM(S): at 05:46

## 2021-01-31 RX ADMIN — ISOSORBIDE MONONITRATE 30 MILLIGRAM(S): 60 TABLET, EXTENDED RELEASE ORAL at 12:38

## 2021-01-31 RX ADMIN — Medication 81 MILLIGRAM(S): at 12:38

## 2021-01-31 RX ADMIN — FINASTERIDE 5 MILLIGRAM(S): 5 TABLET, FILM COATED ORAL at 12:38

## 2021-01-31 RX ADMIN — Medication 50 MILLIGRAM(S): at 05:46

## 2021-01-31 RX ADMIN — ATORVASTATIN CALCIUM 20 MILLIGRAM(S): 80 TABLET, FILM COATED ORAL at 20:42

## 2021-01-31 RX ADMIN — Medication 40 MILLIGRAM(S): at 05:46

## 2021-01-31 RX ADMIN — Medication 50 MILLIGRAM(S): at 18:53

## 2021-01-31 RX ADMIN — Medication 40 MILLIGRAM(S): at 18:52

## 2021-01-31 NOTE — CONSULT NOTE ADULT - SUBJECTIVE AND OBJECTIVE BOX
CHIEF COMPLAINT: progressvie pedal edema      PAST MEDICAL & SURGICAL HISTORY:  Pacemaker    H/O cardiac arrhythmia    Hypothyroidism (acquired)    BPH (benign prostatic hyperplasia)    Hyperlipidemia    Hypertension    S/P tonsillectomy    Pacemaker        HPI: 89 male, h/o  HFpEF, CAD + stress test 11/2020.  h/o persistent  Afib on Eliquis SSS s/p PPM,  pacer dependent,  HTN, hypothyroid, who  presents with progressive pedal edema and mild dyspnea.    The patient lives in assisted living.  The patient had been maintained on Furosemide 40 BID PO. Approximately 1 week ago, I was  called  by the primary care physician who noted that the patient's edema worsened, and the patient was placed on Bumex 1 mg daily. The patient failed to respond to this and developed progressive edema up the thigh to the scrotum.  He had no chest pain , syncope or presyncope.     The patient had been recently admitted with a fall and ? hypotension with injury.  Recent CT abdomen and pelvis 12/2020 demonstrated no evidence of obstruction.  Repeat abdominal pelvic ultrasound is pending.     In the ED, VS 97.9, 134/82, 57, 18     troponin t 114 ,mildly elevated    BNP > 6000    creatinine 2.2              PREVIOUS DIAGNOSTIC TESTING:      CT chest + moderate R, mild L effusion    ECHO 11/2020 EF 75% hyperdynamic LV mild to moderate pulmonary HTN  FINDINGS:    STRESS  FINDINGS:    CATHETERIZATION  FINDINGS:    ELECTROPHYSIOLOGY STUDY  FINDINGS:    CAROTID ULTRASOUND:  FINDINGS    VENOUS DUPLEX SCAN:  FINDINGS:    CHEST CT PULMONARY ANGIO with IV Contrast:  FINDINGS:  MEDICATIONS  (STANDING):  aspirin enteric coated 81 milliGRAM(s) Oral daily  atorvastatin 20 milliGRAM(s) Oral at bedtime  finasteride 5 milliGRAM(s) Oral daily  furosemide   Injectable 40 milliGRAM(s) IV Push daily  isosorbide   mononitrate ER Tablet (IMDUR) 30 milliGRAM(s) Oral daily  levothyroxine 50 MICROGram(s) Oral daily  metoprolol tartrate 50 milliGRAM(s) Oral two times a day    MEDICATIONS  (PRN):      FAMILY HISTORY:  FH: heart disease (Father)  father        SOCIAL HISTORY:    CIGARETTES:    ALCOHOL:    REVIEW OF SYSTEMS:    CONSTITUTIONAL: No fever, weight loss, chills, shakes, or fatigue  EYES: No eye pain, visual disturbances, or discharge  ENMT:  No difficulty hearing, tinnitus, vertigo; No sinus or throat pain  NECK: No pain or stiffness  BREASTS: No pain, masses, or nipple discharge  RESPIRATORY: No cough, wheezing, hemoptysis, or shortness of breath  CARDIOVASCULAR: No chest pain,  + dyspnea, palpitations, dizziness, syncope, paroxysmal nocturnal dyspnea, orthopnea,+ Leg swelling  GASTROINTESTINAL: No abdominal  or epigastric pain, nausea, vomiting, hematemesis, diarrhea, constipation, melena or bright red blood.  GENITOURINARY: No dysuria, nocturia, hematuria, or urinary incontinence  NEUROLOGICAL: No headaches, memory loss, slurred speech, limb weakness, loss of strength, numbness, or tremors  SKIN: No itching, burning, rashes, or lesions   LYMPH NODES: No enlarged glands  ENDOCRINE: No heat or cold intolerance, or hair loss  MUSCULOSKELETAL: No joint pain or swelling, muscle, back, or extremity pain  PSYCHIATRIC: No depression, anxiety, or difficulty sleeping  HEME/LYMPH: No easy bruising or bleeding gums  ALLERY AND IMMUNOLOGIC: No hives or rash.      Vital Signs Last 24 Hrs  T(C): 36.4 (31 Jan 2021 12:17), Max: 36.7 (30 Jan 2021 19:24)  T(F): 97.6 (31 Jan 2021 12:17), Max: 98 (30 Jan 2021 19:24)  HR: 60 (31 Jan 2021 12:17) (50 - 60)  BP: 131/79 (31 Jan 2021 12:17) (111/63 - 144/75)  BP(mean): --  RR: 18 (31 Jan 2021 12:17) (18 - 18)  SpO2: 96% (31 Jan 2021 12:17) (94% - 99%)  Daily Height in cm: 167.64 (31 Jan 2021 00:59)    Daily     01-30 @ 07:01 - 01-31 @ 07:00  --------------------------------------------------------  IN: 120 mL / OUT: 250 mL / NET: -130 mL    01-31 @ 07:01 - 01-31 @ 15:25  --------------------------------------------------------  IN: 420 mL / OUT: 450 mL / NET: -30 mL          PHYSICAL EXAM: WT 64.8Kg    GENERAL: In no apparent distress, well nourished, and hydrated.  HEAD:  Atraumatic, Normocephalic  EYES: EOMI, PERRLA, conjunctiva and sclera clear  ENMT: No tonsillar erythema, exudates, or enlargement; Moist mucous membranes, Good dentition, No lesions  NECK: Supple and normal thyroid.  JVD increased mildly .  Carotid pulse is 2+ bilaterally.  HEART: Regular rate and rhythm; No murmurs, rubs, or gallops.  PULMONARY:  decreased BS on right , trace L ally.  ABDOMEN: Soft, Nontender, Nondistended; Bowel sounds present  EXTREMITIES:   Pedal edema extending up thigh ral Pulses, No clubbing, cyanosis, or edema  LYMPH: No lymphadenopathy noted  NEUROLOGICAL: Grossly nonfocal          INTERPRETATION OF TELEMETRY:    ECG:    I&O's Detail    30 Jan 2021 07:01  -  31 Jan 2021 07:00  --------------------------------------------------------  IN:    Oral Fluid: 120 mL  Total IN: 120 mL    OUT:    Voided (mL): 250 mL  Total OUT: 250 mL    Total NET: -130 mL      31 Jan 2021 07:01  -  31 Jan 2021 15:25  --------------------------------------------------------  IN:    Oral Fluid: 420 mL  Total IN: 420 mL    OUT:    Voided (mL): 450 mL  Total OUT: 450 mL    Total NET: -30 mL          LABS:                        11.9   6.97  )-----------( 352      ( 31 Jan 2021 06:56 )             38.5     01-31    136  |  98  |  53<H>  ----------------------------<  75  4.1   |  20<L>  |  2.06<H>    Ca    8.8      31 Jan 2021 06:40  Mg     2.5     01-31    TPro  7.0  /  Alb  3.4  /  TBili  0.4  /  DBili  x   /  AST  28  /  ALT  16  /  AlkPhos  106  01-30    CARDIAC MARKERS ( 30 Jan 2021 23:13 )  x     / x     / 144 U/L / x     / 6.5 ng/mL      PT/INR - ( 30 Jan 2021 19:07 )   PT: 14.7 sec;   INR: 1.24 ratio         PTT - ( 30 Jan 2021 19:07 )  PTT:32.1 sec    BNPSerum Pro-Brain Natriuretic Peptide: 6445 pg/mL (01-30 @ 19:07)    I&O's Detail    30 Jan 2021 07:01  -  31 Jan 2021 07:00  --------------------------------------------------------  IN:    Oral Fluid: 120 mL  Total IN: 120 mL    OUT:    Voided (mL): 250 mL  Total OUT: 250 mL    Total NET: -130 mL      31 Jan 2021 07:01  -  31 Jan 2021 15:25  --------------------------------------------------------  IN:    Oral Fluid: 420 mL  Total IN: 420 mL    OUT:    Voided (mL): 450 mL  Total OUT: 450 mL    Total NET: -30 mL        Daily Height in cm: 167.64 (31 Jan 2021 00:59)    Daily     RADIOLOGY & ADDITIONAL STUDIES:

## 2021-01-31 NOTE — CONSULT NOTE ADULT - ASSESSMENT
89 year male h/o HTN hyperlipidemia CAD positive stress test, h/ o progressive renal failure, HFpEF, presents with progressive pedal edema R>L heart failure. h/o PAF that became sustained, associated with worsening CHF, PPM in place with pacemaker dependent.    analia tran     will discuss with son option of amiodarone loading and cardioversion.     Although ideally would cath, with advanced age and advanced renal failure, the risk of progressive severe renal failure post cath needs to be weighed again the benefit will discuss with interventional cardiology.

## 2021-01-31 NOTE — CONSULT NOTE ADULT - PROBLEM SELECTOR RECOMMENDATION 4
will discuss with interventional cards if any role for cath, but high risk due to age and renal failure. continue ASA. continue metoprolol XL 50 daily, IMdur. Will attempt to increase imdur after diuresis.

## 2021-01-31 NOTE — PATIENT PROFILE ADULT - ARRIVAL FROM
[facility] in [facility]/Assisted living [facility]

## 2021-01-31 NOTE — PROGRESS NOTE ADULT - SUBJECTIVE AND OBJECTIVE BOX
Patient is a 89y old  Male who presents with a chief complaint of 89M p/w b/l LE swelling (31 Jan 2021 15:24)      SUBJECTIVE / OVERNIGHT EVENTS: Comfortable without new complaints.   Review of Systems  chest pain no  palpitations no  sob no  nausea no  headache no    MEDICATIONS  (STANDING):  aspirin enteric coated 81 milliGRAM(s) Oral daily  atorvastatin 20 milliGRAM(s) Oral at bedtime  finasteride 5 milliGRAM(s) Oral daily  furosemide   Injectable 40 milliGRAM(s) IV Push every 12 hours  isosorbide   mononitrate ER Tablet (IMDUR) 30 milliGRAM(s) Oral daily  levothyroxine 50 MICROGram(s) Oral daily  metoprolol tartrate 50 milliGRAM(s) Oral two times a day    MEDICATIONS  (PRN):      Vital Signs Last 24 Hrs  T(C): 36.4 (31 Jan 2021 12:17), Max: 36.7 (30 Jan 2021 19:24)  T(F): 97.6 (31 Jan 2021 12:17), Max: 98 (30 Jan 2021 19:24)  HR: 60 (31 Jan 2021 12:17) (50 - 60)  BP: 131/79 (31 Jan 2021 12:17) (111/63 - 144/75)  BP(mean): --  RR: 18 (31 Jan 2021 12:17) (18 - 18)  SpO2: 96% (31 Jan 2021 12:17) (94% - 99%)    PHYSICAL EXAM:  GENERAL: NAD, well-developed  HEAD:  Atraumatic, Normocephalic  EYES: EOMI, PERRLA, conjunctiva and sclera clear  NECK: Supple, No JVD  CHEST/LUNG: Clear to auscultation bilaterally; No wheeze  HEART: Regular rate and rhythm; No murmurs, rubs, or gallops  ABDOMEN: Soft, Nontender, Nondistended; Bowel sounds present  EXTREMITIES:  2+ bipedal edema  PSYCH: AAOx3  NEUROLOGY: non-focal  SKIN: No rashes or lesions    LABS:                        11.9   6.97  )-----------( 352      ( 31 Jan 2021 06:56 )             38.5     01-31    136  |  98  |  53<H>  ----------------------------<  75  4.1   |  20<L>  |  2.06<H>    Ca    8.8      31 Jan 2021 06:40  Mg     2.5     01-31    TPro  7.0  /  Alb  3.4  /  TBili  0.4  /  DBili  x   /  AST  28  /  ALT  16  /  AlkPhos  106  01-30    PT/INR - ( 30 Jan 2021 19:07 )   PT: 14.7 sec;   INR: 1.24 ratio         PTT - ( 30 Jan 2021 19:07 )  PTT:32.1 sec  CARDIAC MARKERS ( 30 Jan 2021 23:13 )  x     / x     / 144 U/L / x     / 6.5 ng/mL            RADIOLOGY & ADDITIONAL TESTS:    Imaging Personally Reviewed:    Consultant(s) Notes Reviewed:      Care Discussed with Consultants/Other Providers:

## 2021-02-01 LAB
ANION GAP SERPL CALC-SCNC: 13 MMOL/L — SIGNIFICANT CHANGE UP (ref 5–17)
BUN SERPL-MCNC: 53 MG/DL — HIGH (ref 7–23)
CALCIUM SERPL-MCNC: 8.9 MG/DL — SIGNIFICANT CHANGE UP (ref 8.4–10.5)
CHLORIDE SERPL-SCNC: 100 MMOL/L — SIGNIFICANT CHANGE UP (ref 96–108)
CO2 SERPL-SCNC: 22 MMOL/L — SIGNIFICANT CHANGE UP (ref 22–31)
CREAT SERPL-MCNC: 2.25 MG/DL — HIGH (ref 0.5–1.3)
GLUCOSE BLDC GLUCOMTR-MCNC: 109 MG/DL — HIGH (ref 70–99)
GLUCOSE SERPL-MCNC: 108 MG/DL — HIGH (ref 70–99)
HCT VFR BLD CALC: 38.5 % — LOW (ref 39–50)
HGB BLD-MCNC: 12.1 G/DL — LOW (ref 13–17)
MAGNESIUM SERPL-MCNC: 2.4 MG/DL — SIGNIFICANT CHANGE UP (ref 1.6–2.6)
MCHC RBC-ENTMCNC: 27 PG — SIGNIFICANT CHANGE UP (ref 27–34)
MCHC RBC-ENTMCNC: 31.4 GM/DL — LOW (ref 32–36)
MCV RBC AUTO: 85.9 FL — SIGNIFICANT CHANGE UP (ref 80–100)
NRBC # BLD: 0 /100 WBCS — SIGNIFICANT CHANGE UP (ref 0–0)
PLATELET # BLD AUTO: 342 K/UL — SIGNIFICANT CHANGE UP (ref 150–400)
POTASSIUM SERPL-MCNC: 3.8 MMOL/L — SIGNIFICANT CHANGE UP (ref 3.5–5.3)
POTASSIUM SERPL-SCNC: 3.8 MMOL/L — SIGNIFICANT CHANGE UP (ref 3.5–5.3)
RBC # BLD: 4.48 M/UL — SIGNIFICANT CHANGE UP (ref 4.2–5.8)
RBC # FLD: 19.2 % — HIGH (ref 10.3–14.5)
SODIUM SERPL-SCNC: 135 MMOL/L — SIGNIFICANT CHANGE UP (ref 135–145)
TSH SERPL-MCNC: 6.32 UIU/ML — HIGH (ref 0.27–4.2)
WBC # BLD: 7.19 K/UL — SIGNIFICANT CHANGE UP (ref 3.8–10.5)
WBC # FLD AUTO: 7.19 K/UL — SIGNIFICANT CHANGE UP (ref 3.8–10.5)

## 2021-02-01 PROCEDURE — 76770 US EXAM ABDO BACK WALL COMP: CPT | Mod: 26

## 2021-02-01 RX ORDER — APIXABAN 2.5 MG/1
2.5 TABLET, FILM COATED ORAL
Refills: 0 | Status: DISCONTINUED | OUTPATIENT
Start: 2021-02-01 | End: 2021-02-05

## 2021-02-01 RX ORDER — FUROSEMIDE 40 MG
40 TABLET ORAL DAILY
Refills: 0 | Status: DISCONTINUED | OUTPATIENT
Start: 2021-02-02 | End: 2021-02-02

## 2021-02-01 RX ADMIN — ISOSORBIDE MONONITRATE 30 MILLIGRAM(S): 60 TABLET, EXTENDED RELEASE ORAL at 12:53

## 2021-02-01 RX ADMIN — Medication 81 MILLIGRAM(S): at 12:52

## 2021-02-01 RX ADMIN — Medication 50 MICROGRAM(S): at 05:43

## 2021-02-01 RX ADMIN — ATORVASTATIN CALCIUM 20 MILLIGRAM(S): 80 TABLET, FILM COATED ORAL at 20:53

## 2021-02-01 RX ADMIN — FINASTERIDE 5 MILLIGRAM(S): 5 TABLET, FILM COATED ORAL at 12:53

## 2021-02-01 RX ADMIN — Medication 40 MILLIGRAM(S): at 05:43

## 2021-02-01 RX ADMIN — Medication 50 MILLIGRAM(S): at 17:20

## 2021-02-01 RX ADMIN — Medication 50 MILLIGRAM(S): at 05:43

## 2021-02-01 RX ADMIN — APIXABAN 2.5 MILLIGRAM(S): 2.5 TABLET, FILM COATED ORAL at 20:53

## 2021-02-01 NOTE — PROGRESS NOTE ADULT - SUBJECTIVE AND OBJECTIVE BOX
Patient is a 89y old  Male who presents with a chief complaint of 89M p/w b/l LE swelling (31 Jan 2021 15:24)      SUBJECTIVE / OVERNIGHT EVENTS: Comfortable without new complaints.   Review of Systems  chest pain no  palpitations no  sob no  nausea no  headache no    MEDICATIONS  (STANDING):  apixaban 2.5 milliGRAM(s) Oral two times a day  aspirin enteric coated 81 milliGRAM(s) Oral daily  atorvastatin 20 milliGRAM(s) Oral at bedtime  finasteride 5 milliGRAM(s) Oral daily  isosorbide   mononitrate ER Tablet (IMDUR) 30 milliGRAM(s) Oral daily  levothyroxine 50 MICROGram(s) Oral daily  metoprolol tartrate 50 milliGRAM(s) Oral two times a day    MEDICATIONS  (PRN):      Vital Signs Last 24 Hrs  T(C): 36.6 (01 Feb 2021 13:11), Max: 36.6 (01 Feb 2021 04:41)  T(F): 97.8 (01 Feb 2021 13:11), Max: 97.8 (01 Feb 2021 04:41)  HR: 60 (01 Feb 2021 13:11) (60 - 61)  BP: 118/70 (01 Feb 2021 13:11) (112/70 - 118/73)  BP(mean): --  RR: 18 (01 Feb 2021 13:11) (18 - 18)  SpO2: 91% (01 Feb 2021 13:11) (91% - 94%)    PHYSICAL EXAM:  GENERAL: NAD, well-developed  HEAD:  Atraumatic, Normocephalic  EYES: EOMI, PERRLA, conjunctiva and sclera clear  NECK: Supple, No JVD  CHEST/LUNG: Clear to auscultation bilaterally; No wheeze  HEART: Regular rate and rhythm; No murmurs, rubs, or gallops  ABDOMEN: Soft, Nontender, Nondistended; Bowel sounds present  EXTREMITIES:  2+ bipedal edema  PSYCH: AAOx3  NEUROLOGY: non-focal  SKIN: No rashes or lesions    LABS:                        12.1   7.19  )-----------( 342      ( 01 Feb 2021 05:18 )             38.5     02-01    135  |  100  |  53<H>  ----------------------------<  108<H>  3.8   |  22  |  2.25<H>    Ca    8.9      01 Feb 2021 05:18  Mg     2.4     02-01    TPro  7.0  /  Alb  3.4  /  TBili  0.4  /  DBili  x   /  AST  28  /  ALT  16  /  AlkPhos  106  01-30    PT/INR - ( 30 Jan 2021 19:07 )   PT: 14.7 sec;   INR: 1.24 ratio         PTT - ( 30 Jan 2021 19:07 )  PTT:32.1 sec  CARDIAC MARKERS ( 30 Jan 2021 23:13 )  x     / x     / 144 U/L / x     / 6.5 ng/mL            RADIOLOGY & ADDITIONAL TESTS:    Imaging Personally Reviewed:    Consultant(s) Notes Reviewed:      Care Discussed with Consultants/Other Providers:

## 2021-02-02 ENCOUNTER — TRANSCRIPTION ENCOUNTER (OUTPATIENT)
Age: 86
End: 2021-02-02

## 2021-02-02 LAB
ANION GAP SERPL CALC-SCNC: 13 MMOL/L — SIGNIFICANT CHANGE UP (ref 5–17)
BUN SERPL-MCNC: 48 MG/DL — HIGH (ref 7–23)
CALCIUM SERPL-MCNC: 9 MG/DL — SIGNIFICANT CHANGE UP (ref 8.4–10.5)
CHLORIDE SERPL-SCNC: 98 MMOL/L — SIGNIFICANT CHANGE UP (ref 96–108)
CO2 SERPL-SCNC: 24 MMOL/L — SIGNIFICANT CHANGE UP (ref 22–31)
CREAT SERPL-MCNC: 2.05 MG/DL — HIGH (ref 0.5–1.3)
GLUCOSE SERPL-MCNC: 92 MG/DL — SIGNIFICANT CHANGE UP (ref 70–99)
MAGNESIUM SERPL-MCNC: 2.3 MG/DL — SIGNIFICANT CHANGE UP (ref 1.6–2.6)
POTASSIUM SERPL-MCNC: 3.8 MMOL/L — SIGNIFICANT CHANGE UP (ref 3.5–5.3)
POTASSIUM SERPL-SCNC: 3.8 MMOL/L — SIGNIFICANT CHANGE UP (ref 3.5–5.3)
SODIUM SERPL-SCNC: 135 MMOL/L — SIGNIFICANT CHANGE UP (ref 135–145)

## 2021-02-02 RX ORDER — FUROSEMIDE 40 MG
80 TABLET ORAL
Refills: 0 | Status: DISCONTINUED | OUTPATIENT
Start: 2021-02-02 | End: 2021-02-05

## 2021-02-02 RX ORDER — FUROSEMIDE 40 MG
40 TABLET ORAL
Refills: 0 | Status: DISCONTINUED | OUTPATIENT
Start: 2021-02-02 | End: 2021-02-05

## 2021-02-02 RX ADMIN — APIXABAN 2.5 MILLIGRAM(S): 2.5 TABLET, FILM COATED ORAL at 20:47

## 2021-02-02 RX ADMIN — Medication 40 MILLIGRAM(S): at 05:11

## 2021-02-02 RX ADMIN — FINASTERIDE 5 MILLIGRAM(S): 5 TABLET, FILM COATED ORAL at 12:58

## 2021-02-02 RX ADMIN — Medication 50 MILLIGRAM(S): at 17:06

## 2021-02-02 RX ADMIN — ISOSORBIDE MONONITRATE 30 MILLIGRAM(S): 60 TABLET, EXTENDED RELEASE ORAL at 12:58

## 2021-02-02 RX ADMIN — ATORVASTATIN CALCIUM 20 MILLIGRAM(S): 80 TABLET, FILM COATED ORAL at 20:47

## 2021-02-02 RX ADMIN — Medication 81 MILLIGRAM(S): at 12:58

## 2021-02-02 RX ADMIN — Medication 50 MICROGRAM(S): at 05:11

## 2021-02-02 RX ADMIN — Medication 50 MILLIGRAM(S): at 05:11

## 2021-02-02 RX ADMIN — APIXABAN 2.5 MILLIGRAM(S): 2.5 TABLET, FILM COATED ORAL at 10:00

## 2021-02-02 NOTE — PHYSICAL THERAPY INITIAL EVALUATION ADULT - IMPAIRMENTS FOUND, PT EVAL
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

## 2021-02-02 NOTE — PROGRESS NOTE ADULT - SUBJECTIVE AND OBJECTIVE BOX
Patient is a 89y old  Male who presents with a chief complaint of 89M p/w b/l LE swelling (02 Feb 2021 08:16)      SUBJECTIVE / OVERNIGHT EVENTS: feels better.  Review of Systems  chest pain no  palpitations no  sob no  nausea no  headache no    MEDICATIONS  (STANDING):  apixaban 2.5 milliGRAM(s) Oral two times a day  aspirin enteric coated 81 milliGRAM(s) Oral daily  atorvastatin 20 milliGRAM(s) Oral at bedtime  finasteride 5 milliGRAM(s) Oral daily  furosemide   Injectable 40 milliGRAM(s) IV Push daily  isosorbide   mononitrate ER Tablet (IMDUR) 30 milliGRAM(s) Oral daily  levothyroxine 50 MICROGram(s) Oral daily  metoprolol tartrate 50 milliGRAM(s) Oral two times a day    MEDICATIONS  (PRN):      Vital Signs Last 24 Hrs  T(C): 36.3 (02 Feb 2021 11:31), Max: 36.9 (02 Feb 2021 04:21)  T(F): 97.3 (02 Feb 2021 11:31), Max: 98.4 (02 Feb 2021 04:21)  HR: 64 (02 Feb 2021 12:52) (59 - 64)  BP: 138/70 (02 Feb 2021 12:52) (127/63 - 144/72)  BP(mean): --  RR: 18 (02 Feb 2021 12:52) (18 - 19)  SpO2: 94% (02 Feb 2021 12:52) (92% - 94%)    PHYSICAL EXAM:  GENERAL: NAD, well-developed  HEAD:  Atraumatic, Normocephalic  EYES: EOMI, PERRLA, conjunctiva and sclera clear  NECK: Supple, No JVD  CHEST/LUNG: Clear to auscultation bilaterally; No wheeze  HEART: Regular rate and rhythm; No murmurs, rubs, or gallops  ABDOMEN: Soft, Nontender, Nondistended; Bowel sounds present  EXTREMITIES:  1+ bipedal edema  PSYCH: AAOx3  NEUROLOGY: non-focal  SKIN: No rashes or lesions    LABS:                        12.1   7.19  )-----------( 342      ( 01 Feb 2021 05:18 )             38.5     02-02    135  |  98  |  48<H>  ----------------------------<  92  3.8   |  24  |  2.05<H>    Ca    9.0      02 Feb 2021 06:29  Mg     2.3     02-02                  RADIOLOGY & ADDITIONAL TESTS:    Imaging Personally Reviewed:    Consultant(s) Notes Reviewed:      Care Discussed with Consultants/Other Providers:

## 2021-02-02 NOTE — PHYSICAL THERAPY INITIAL EVALUATION ADULT - PRECAUTIONS/LIMITATIONS, REHAB EVAL
COVID 19 )not detected ) 1/20/21 and antibody (-) 1/31/21 ; CT CHEST Mod R and sm L pleural effusion w/partail atelectasis ; US Kidney : mild cortical echogenicity/fall precautions

## 2021-02-02 NOTE — CONSULT NOTE ADULT - SUBJECTIVE AND OBJECTIVE BOX
HPI: Mr. Cleveland is a 89 year-old man with history of multiple medical issues including hypertension, coronary artery disease, atrial fibrillation, and congestive heart failure with preserved EF, who was sent 1/30/21 to the [facility] ER with worsening bilateral leg swelling.      PAST MEDICAL & SURGICAL HISTORY: CAD AFib HFpEF Hypothyroidism (acquired) BPH (benign prostatic hyperplasia) Hyperlipidemia Hypertension S/P tonsillectomy Pacemaker  Allergies No Known Allergies  SOCIAL HISTORY: Denies ETOh,Smoking,   FAMILY HISTORY: FH: heart disease (Father)  REVIEW OF SYSTEMS: CONSTITUTIONAL: No weakness, fevers or chills EYES/ENT: No visual changes;  No vertigo or throat pain  NECK: No pain or stiffness RESPIRATORY: No cough, wheezing, hemoptysis; No shortness of breath CARDIOVASCULAR: No chest pain or palpitations; (+)leg swelling GASTROINTESTINAL: No abdominal or epigastric pain. No nausea, vomiting, or hematemesis; No diarrhea or constipation. No melena or hematochezia. GENITOURINARY: No dysuria, frequency or hematuria NEUROLOGICAL: No numbness or weakness SKIN: No itching, burning, rashes, or lesions  All other review of systems is negative unless indicated above.  VITAL: T(C): , Max: 36.9 (02-02-21 @ 04:21) T(F): , Max: 98.4 (02-02-21 @ 04:21) HR: 60 (02-02-21 @ 04:21) BP: 136/75 (02-02-21 @ 04:21) RR: 18 (02-02-21 @ 04:21) SpO2: 93% (02-02-21 @ 04:21)  PHYSICAL EXAM: Constitutional: NAD, Alert HEENT: NCAT, MMM Neck: Supple, No JVD Respiratory: CTA-b/l Cardiovascular: RRR s1s2, no m/r/g Gastrointestinal: BS+, soft, NT/ND Extremities: No peripheral edema b/l Neurological: no focal deficits; strength grossly intact Back: no CVAT b/l Skin: No rashes, no nevi  LABS:                      12.1  7.19  )-----------( 342      ( 01 Feb 2021 05:18 )            38.5   Na(135)/K(3.8)/Cl(98)/HCO3(24)/BUN(48)/Cr(2.05)Glu(92)/Ca(9.0)/Mg(2.3)/PO4(--)    02-02 @ 06:29 Na(135)/K(3.8)/Cl(100)/HCO3(22)/BUN(53)/Cr(2.25)Glu(108)/Ca(8.9)/Mg(2.4)/PO4(--)    02-01 @ 05:18 Na(136)/K(4.1)/Cl(98)/HCO3(20)/BUN(53)/Cr(2.06)Glu(75)/Ca(8.8)/Mg(2.5)/PO4(--)    01-31 @ 06:40 Na(135)/K(4.3)/Cl(97)/HCO3(24)/BUN(53)/Cr(2.22)Glu(89)/Ca(8.7)/Mg(2.8)/PO4(--)    01-30 @ 19:07 (11/3-11/4/20)- Cr 1.55-2.03 (12/12/20)- UA - lg blood, 1+ prot  IMAGING: < from: US Kidney and Bladder (02.01.21 @ 15:43) > Mildly increased cortical echogenicity, may be seen with medical renal disease. No hydronephrosis. Prostatomegaly.  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 GFR ~20-30ml/min (2)Lytes - acceptable (3)CV - resovling/resolved CHF flare (4)Afib - on Eliquis at home; I see no contraindication to Eliquis use at present. Given his CKD and given his age, I would opt for 2.5mg po bid   RECOMMEND: (1)Continue Eliquis 2.5mg po bid (2)Can advance to PO diuretics - Bumex 2mg po daily seems reasonable (was on Bumex 1mg po daily prior to admission) (3)No renal objection to discharge - could f/u at my office in 1-2 weeks  Thank you for involving [facility] in this patient's care.  With warm regards,  Lonny Dickey MD  [facility] Office: [phone] Cell: [phone]          HPI: Mr. Cleveland is a 89 year-old man with history of multiple medical issues including hypertension, coronary artery disease, atrial fibrillation, and congestive heart failure with preserved EF, who was sent 1/30/21 to the [facility] ER with worsening bilateral leg swelling and penile/scrotal swelling. He shares that the swelling has since subsided. He denies known history of CKD. He denies notable urinary changes of late.  PAST MEDICAL & SURGICAL HISTORY: CAD AFib HFpEF Hypothyroidism (acquired) BPH (benign prostatic hyperplasia) Hyperlipidemia Hypertension S/P tonsillectomy Pacemaker  Allergies No Known Allergies  SOCIAL HISTORY: Denies ETOh,Smoking,   FAMILY HISTORY: FH: heart disease (Father)  REVIEW OF SYSTEMS: CONSTITUTIONAL: No weakness, fevers or chills EYES/ENT: No visual changes;  No vertigo or throat pain  NECK: No pain or stiffness RESPIRATORY: No cough, wheezing, hemoptysis; No shortness of breath CARDIOVASCULAR: No chest pain or palpitations; (+)leg swelling GASTROINTESTINAL: No abdominal or epigastric pain. No nausea, vomiting, or hematemesis; No diarrhea or constipation. No melena or hematochezia. GENITOURINARY: (+)penile/scrotal swelling NEUROLOGICAL: No numbness or weakness SKIN: No itching, burning, rashes, or lesions  All other review of systems is negative unless indicated above.  VITAL: T(C): , Max: 36.9 (02-02-21 @ 04:21) T(F): , Max: 98.4 (02-02-21 @ 04:21) HR: 60 (02-02-21 @ 04:21) BP: 136/75 (02-02-21 @ 04:21) RR: 18 (02-02-21 @ 04:21) SpO2: 93% (02-02-21 @ 04:21)  PHYSICAL EXAM: Constitutional: NAD, Alert HEENT: NCAT, DMM Neck: Supple, No JVD Respiratory: CTA-b/l Cardiovascular: RRR s1s2, no m/r/g Gastrointestinal: BS+, soft, NT/ND Extremities: 1+ b/l LE edema Neurological: no focal deficits; strength grossly intact Back: no CVAT b/l Skin: No rashes, no nevi  LABS:                      12.1  7.19  )-----------( 342      ( 01 Feb 2021 05:18 )            38.5   Na(135)/K(3.8)/Cl(98)/HCO3(24)/BUN(48)/Cr(2.05)Glu(92)/Ca(9.0)/Mg(2.3)/PO4(--)    02-02 @ 06:29 Na(135)/K(3.8)/Cl(100)/HCO3(22)/BUN(53)/Cr(2.25)Glu(108)/Ca(8.9)/Mg(2.4)/PO4(--)    02-01 @ 05:18 Na(136)/K(4.1)/Cl(98)/HCO3(20)/BUN(53)/Cr(2.06)Glu(75)/Ca(8.8)/Mg(2.5)/PO4(--)    01-31 @ 06:40 Na(135)/K(4.3)/Cl(97)/HCO3(24)/BUN(53)/Cr(2.22)Glu(89)/Ca(8.7)/Mg(2.8)/PO4(--)    01-30 @ 19:07 (11/3-11/4/20)- Cr 1.55-2.03 (12/12/20)- UA - lg blood, 1+ prot  IMAGING: < from: US Kidney and Bladder (02.01.21 @ 15:43) > Mildly increased cortical echogenicity, may be seen with medical renal disease. No hydronephrosis. Prostatomegaly.  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 GFR ~20-30ml/min (2)Lytes - acceptable (3)CV - resovling/resolved CHF flare (4)Afib - on Eliquis at home; I see no contraindication to Eliquis use at present. Given his CKD and given his age, I would opt for 2.5mg po bid   RECOMMEND: (1)Continue Eliquis 2.5mg po bid (2)Can advance to PO diuretics - Bumex 2mg po daily seems reasonable (was on Bumex 1mg po daily prior to admission) (3)No renal objection to discharge - could f/u at my office in 1-2 weeks  Thank you for involving [facility] in this patient's care.  With warm regards,  Lonny Dickey MD  [facility] Office: [phone] Cell: [phone]

## 2021-02-02 NOTE — PHYSICAL THERAPY INITIAL EVALUATION ADULT - ADDITIONAL COMMENTS
pt lives in [facility] in [facility] in [place] x 5 yrs ; pt amb with RW and assist with adl's PTA ;pt has son Vincenzo [phone] pt lives in [facility] in [facility] in [place] x 5 yrs ; pt amb with RW and assist with adl's PTA ;pt has son Vincenzo [phone] as emergency contact or support person ; ID caregiver declined ; pt has necklace with call button for staff at [facility] for assist as needed per pt

## 2021-02-02 NOTE — PHYSICAL THERAPY INITIAL EVALUATION ADULT - GAIT DEVIATIONS NOTED, PT EVAL
decreased piyush/increased time in double stance/footdrop/decreased step length/decreased stride length/decreased weight-shifting ability

## 2021-02-02 NOTE — PROGRESS NOTE ADULT - SUBJECTIVE AND OBJECTIVE BOX
INTERVAL HPI/OVERNIGHT EVENTS: patient feels well. No chest pain. less short of breath    MEDICATIONS  (STANDING):  apixaban 2.5 milliGRAM(s) Oral two times a day  aspirin enteric coated 81 milliGRAM(s) Oral daily  atorvastatin 20 milliGRAM(s) Oral at bedtime  finasteride 5 milliGRAM(s) Oral daily  furosemide   Injectable 40 milliGRAM(s) IV Push daily  isosorbide   mononitrate ER Tablet (IMDUR) 30 milliGRAM(s) Oral daily  levothyroxine 50 MICROGram(s) Oral daily  metoprolol tartrate 50 milliGRAM(s) Oral two times a day    MEDICATIONS  (PRN):      Allergies    No Known Allergies    Intolerances      ROS:  General: Pt denies recent weight loss/fever/chills    Neurological: denies numbness or  sensation loss    Cardiovascular: denies chest pain/palpitations/leg edema    Respiratory and Thorax: denies SOB/cough/wheezing    Gastrointestinal: denies abdominal pain/diarrhea/constipation/bloody stool    Genitourinary: denies urinary frequency/urgency/ dysuria    Musculoskeletal: denies joint pain or swelling, denies restricted motion    Hematologic: denies abnormal bleeding  	    	  	    		        	    	            Vital Signs Last 24 Hrs  T(C): 36.3 (2021 11:31), Max: 36.9 (2021 04:21)  T(F): 97.3 (2021 11:31), Max: 98.4 (2021 04:21)  HR: 64 (2021 12:52) (59 - 64)  BP: 138/70 (2021 12:52) (127/63 - 144/72)  BP(mean): --  RR: 18 (2021 12:52) (18 - 19)  SpO2: 94% (2021 12:52) (92% - 94%)  Daily     Daily Weight in k.8 (2021 04:21)    - @ 07:01  -  - @ 07:00  --------------------------------------------------------  IN: 760 mL / OUT: 1200 mL / NET: -440 mL     @ 07:01  -  02-02 @ 17:00  --------------------------------------------------------  IN: 520 mL / OUT: 0 mL / NET: 520 mL      Physical Exam:    wdwn male  no JVD  corRRR   lung clear  abd soft   ext minimal edema      LABS:                        12.1   7.19  )-----------( 342      ( 2021 05:18 )             38.5     02-    135  |  98  |  48<H>  ----------------------------<  92  3.8   |  24  |  2.05<H>    Ca    9.0      2021 06:29  Mg     2.3                 RADIOLOGY & ADDITIONAL TESTS:    TELE:    EKG:

## 2021-02-02 NOTE — PHYSICAL THERAPY INITIAL EVALUATION ADULT - DISCHARGE DISPOSITION, PT EVAL
return to [facility] w/ Home PT to increase fxl mobility , strength, balance, endurance , fall prevention education continued/extended care facility/home w/ home PT

## 2021-02-02 NOTE — DISCHARGE NOTE PROVIDER - HOSPITAL COURSE
89 male, h/o  HFpEF, CAD + stress test 11/2020.  h/o persistent  Afib on Eliquis SSS s/p PPM,  pacer dependent,  HTN, hypothyroid, who  presents with progressive pedal edema and mild dyspnea.    The patient lives in assisted living.  The patient had been maintained on Furosemide 40 BID PO. Approximately 1 week ago, I was  called  by the primary care physician who noted that the patient's edema worsened, and the patient was placed on Bumex 1 mg daily. The patient failed to respond to this and developed progressive edema up the thigh to the scrotum.  He had no chest pain , syncope or presyncope. Admitted for further management of acute on chronic diatsolic cHF; Placed on lasix 40 iv bid. Developed lydia. renal consulted; lasix dose reduced.  Cr improved; Pt now optimized form CHF stand point. being discharge don PO lasix Bid         89 male, h/o  HFpEF, CAD + stress test 11/2020.  h/o persistent  Afib on Eliquis SSS s/p PPM,  pacer dependent,  HTN, hypothyroid, who  presents with progressive pedal edema and mild dyspnea.    The patient lives in assisted living.  The patient had been maintained on Furosemide 40 BID PO. Approximately 1 week ago, I was  called  by the primary care physician who noted that the patient's edema worsened, and the patient was placed on Bumex 1 mg daily. The patient failed to respond to this and developed progressive edema up the thigh to the scrotum.  He had no chest pain , syncope or presyncope. Admitted for further management of acute on chronic diatsolic cHF; Placed on lasix 40 iv bid. Developed lydia. renal consulted; lasix dose reduced.  Cr improved; Pt now optimized form CHF stand point. being discharge on PO lasix Bid; discharged to [facility].

## 2021-02-02 NOTE — PHYSICAL THERAPY INITIAL EVALUATION ADULT - ACTIVE RANGE OF MOTION EXAMINATION, REHAB EVAL
Except L foot DF aarom wfl's (arom - 4 degrees ) , PF arom wfl's ;pt report has been like this for a long time h/o/bilateral upper extremity Active ROM was WFL (within functional limits)/bilateral  lower extremity Active ROM was WFL (within functional limits)

## 2021-02-02 NOTE — PROGRESS NOTE ADULT - PROBLEM SELECTOR PROBLEM 3
[FreeTextEntry1] : Documented by Higinio Kidd acting as a scribe for Dr. Ceasar Maddox on 06/11/2019 \par \par All medical record entries made by the Scribe were at my, Dr. Ceasar Maddox's, direction and personally dictated by me on 06/11/2019. I have reviewed the chart and agree that the record accurately reflects my personal performance of the history, physical exam, results, assessment and plan. I have also personally directed, reviewed, and agree with the discharge instructions.\par 
Hypothyroidism (acquired)

## 2021-02-02 NOTE — PHYSICAL THERAPY INITIAL EVALUATION ADULT - PERTINENT HX OF CURRENT PROBLEM, REHAB EVAL
89M w/ HFpEF, CAD, Afib on eliquis, SSS s/p PPM, CKD4, HTN presents for evaluation of b/l LE swelling. The patient reports that he was sent to the hospital by his cardiologist for lower extremity swelling. He denies fever, chills, CP, palpitations, sob, orthopnea, awakening from sleep due to sob, n/v/d, or painful urination. per chart review he had recent hospitalization Dec2020 for syncope w. HFpEF and charted MI (unclear if this is actually chronic myocardial injury vs acute infarct).

## 2021-02-02 NOTE — PHYSICAL THERAPY INITIAL EVALUATION ADULT - IMPAIRMENTS CONTRIBUTING IMPAIRED BED MOBILITY, REHAB EVAL
decreased endurance , pt sat x 8 min with cg to close supervision midl unsteady intially fair - balance progress to fair balance/impaired balance/impaired postural control/decreased strength

## 2021-02-02 NOTE — PHYSICAL THERAPY INITIAL EVALUATION ADULT - GENERAL OBSERVATIONS, REHAB EVAL
pt received in bed nad all siderails up HOb 30 call bell,phone and table in reach ; pt + bed alarm active and bed in lowest position ,pt left as found when session completed

## 2021-02-02 NOTE — DISCHARGE NOTE PROVIDER - CARE PROVIDER_API CALL
Danny Padgett  CARDIOLOGY  [address], [address]  [place] [address]  Phone: [phone]  Fax: [phone]  Follow Up Time: 2 weeks   Danny Padgett  CARDIOLOGY  [address], [address]  [place] [address]  Phone: [phone]  Fax: [phone]  Follow Up Time: 2 weeks    Lonny Dickey)  Internal Medicine; Nephrology  [address], [facility]  [place] [address]  Phone: [phone]  Fax: [phone]  Follow Up Time: 2 weeks

## 2021-02-02 NOTE — DISCHARGE NOTE PROVIDER - PROVIDER TOKENS
PROVIDER:[TOKEN:[[id number]],FOLLOWUP:[2 weeks]] PROVIDER:[TOKEN:[[id number]],FOLLOWUP:[2 weeks]],PROVIDER:[TOKEN:[[id number]],FOLLOWUP:[2 weeks]]

## 2021-02-02 NOTE — DISCHARGE NOTE PROVIDER - NSDCCPCAREPLAN_GEN_ALL_CORE_FT
PRINCIPAL DISCHARGE DIAGNOSIS  Diagnosis: Acute on chronic diastolic heart failure  Assessment and Plan of Treatment: Acute on chronic diastolic heart failure      SECONDARY DISCHARGE DIAGNOSES  Diagnosis: Longstanding persistent atrial fibrillation  Assessment and Plan of Treatment: Longstanding persistent atrial fibrillation    Diagnosis: Coronary arteriosclerosis in native artery  Assessment and Plan of Treatment: Coronary arteriosclerosis in native artery    Diagnosis: Acute kidney injury superimposed on chronic kidney disease  Assessment and Plan of Treatment: Acute kidney injury superimposed on chronic kidney disease     PRINCIPAL DISCHARGE DIAGNOSIS  Diagnosis: Acute on chronic diastolic heart failure  Assessment and Plan of Treatment: Weigh yourself daily.  If you gain 3lbs in 3 days, or 5lbs in a week call your Health Care Provider.  Do not eat or drink foods containing more than 2000mg of salt (sodium) in your diet every day.  Call your Health Care Provider if you have any swelling or increased swelling in your feet, ankles, and/or stomach.  Take all of your medication as directed.  If you become dizzy call your Health Care Provider.        SECONDARY DISCHARGE DIAGNOSES  Diagnosis: Longstanding persistent atrial fibrillation  Assessment and Plan of Treatment: Atrial fibrillation is the most common heart rhythm problem.  The condition puts you at risk for has stroke and heart attack  It helps if you control your blood pressure, not drink more than 1-2 alcohol drinks per day, cut down on caffeine, getting treatment for over active thyroid gland, and get regular exercise  Call your doctor if you feel your heart racing or beating unusually, chest tightness or pain, lightheaded, faint, shortness of breath especially with exercise  It is important to take your heart medication as prescribed  You may be on anticoagulation which is very important to take as directed - you may need blood work to monitor drug levels      Diagnosis: Coronary arteriosclerosis in native artery  Assessment and Plan of Treatment: Coronary artery disease is a condition where the arteries the supply the heart muscle get clogges with fatty deposits & puts you at risk for a heart attack  Call your doctor if you have any new pain, pressure, or discomfort in the center of your chest, pain, tingling or discomfort in arms, back, neck, jaw, or stomach, shortness of breath, nausea, vomiting, burping or heartburn, sweating, cold and clammy skin, racing or abnormal heartbeat for more than 10 minutes or if they keep coming & going.  Call 911 and do not tr to get to hospital by care  You can help yourself with lefestyle changes (quitting smoking if you smoke), eat lots of fruits & vegetables & low fat dairy products, not a lot of meat & fatty foods, walk or some form of physical activity most days of the week, lose weight if you are overweight  Take your cardiac medication as prescribed to lower cholesterol, to lower blood pressure, aspirin to prevent blood clots, and diabetes control  Make sure to keep appointments with doctor for cardiac follow up care      Diagnosis: Acute kidney injury superimposed on chronic kidney disease  Assessment and Plan of Treatment: Avoid taking (NSAIDs) - (ex: Ibuprofen, Advil, Celebrex, Naprosyn)  Avoid taking any nephrotoxic agents (can harm kidneys) - Intravenous contrast for diagnostic testing, combination cold medications.  Have all medications adjusted for your renal function by your Health Care Provider.  Blood pressure control is important.  Take all medication as prescribed.

## 2021-02-02 NOTE — PHYSICAL THERAPY INITIAL EVALUATION ADULT - TRANSFER SAFETY CONCERNS NOTED: SIT/STAND, REHAB EVAL
work on wt shift and balance x 5 min at RW min of 1/decreased balance during turns/losing balance/decreased step length/decreased weight-shifting ability

## 2021-02-02 NOTE — PHYSICAL THERAPY INITIAL EVALUATION ADULT - IMPAIRMENTS CONTRIBUTING TO GAIT DEVIATIONS, PT EVAL
partial drop foot L high steppage gait to compensate pt compensate well , slow and intermittent min unsteady cg to close supervision/impaired balance/impaired postural control/decreased strength

## 2021-02-02 NOTE — DISCHARGE NOTE PROVIDER - NSDCMRMEDTOKEN_GEN_ALL_CORE_FT
acetaminophen 325 mg oral tablet: 2 tab(s) orally every 8 hours, As Needed  aspirin 81 mg oral delayed release tablet: 1 tab(s) orally once a day  atorvastatin 20 mg oral tablet: 1 tab(s) orally once a day (at bedtime)  bumetanide 1 mg oral tablet: 1 tab(s) orally once a day  Eliquis 2.5 mg oral tablet: 1 tab(s) orally 2 times a day  finasteride 5 mg oral tablet: 1 tab(s) orally once a day  isosorbide dinitrate 30 mg oral tablet: 1 tab(s) orally once a day  levothyroxine 50 mcg (0.05 mg) oral tablet: 1 tab(s) orally once a day  metoprolol tartrate 50 mg oral tablet: 1 tab(s) orally once a day   acetaminophen 325 mg oral tablet: 2 tab(s) orally every 8 hours, As Needed  aspirin 81 mg oral delayed release tablet: 1 tab(s) orally once a day  atorvastatin 20 mg oral tablet: 1 tab(s) orally once a day (at bedtime)  Eliquis 2.5 mg oral tablet: 1 tab(s) orally 2 times a day  finasteride 5 mg oral tablet: 1 tab(s) orally once a day  furosemide 40 mg oral tablet: 1 tab(s) orally once a day (in the evening)  furosemide 80 mg oral tablet: 1 tab(s) orally once a day (in the morning)  isosorbide dinitrate 30 mg oral tablet: 1 tab(s) orally once a day  levothyroxine 50 mcg (0.05 mg) oral tablet: 1 tab(s) orally once a day  metoprolol tartrate 50 mg oral tablet: 1 tab(s) orally 2 times a day

## 2021-02-02 NOTE — PHYSICAL THERAPY INITIAL EVALUATION ADULT - IMPAIRED TRANSFERS: SIT/STAND, REHAB EVAL
decreased endurance ; sit-stand x 2 at RW intially mod /min of 1 then min of 1 vc for proper hand palcement with transfer to and from  RW pt understood/impaired balance/impaired postural control/decreased strength

## 2021-02-02 NOTE — PHYSICAL THERAPY INITIAL EVALUATION ADULT - ASR EQUIP NEEDS DISCH PT EVAL
pt has a rolling walker , shower chair with grab bars in walk-in shower , HHA's assist as need for fxl and adl's per pt(HHA;s wash and dress him )  , pt used to walk to dining room for meals with close supervision but with COVID eating right outside his room in hallway per pt ; pt has necklace with call bell for staff in [facility] to call as need

## 2021-02-03 LAB
ANION GAP SERPL CALC-SCNC: 13 MMOL/L — SIGNIFICANT CHANGE UP (ref 5–17)
BUN SERPL-MCNC: 47 MG/DL — HIGH (ref 7–23)
CALCIUM SERPL-MCNC: 8.6 MG/DL — SIGNIFICANT CHANGE UP (ref 8.4–10.5)
CHLORIDE SERPL-SCNC: 99 MMOL/L — SIGNIFICANT CHANGE UP (ref 96–108)
CO2 SERPL-SCNC: 25 MMOL/L — SIGNIFICANT CHANGE UP (ref 22–31)
CREAT SERPL-MCNC: 1.99 MG/DL — HIGH (ref 0.5–1.3)
GLUCOSE SERPL-MCNC: 87 MG/DL — SIGNIFICANT CHANGE UP (ref 70–99)
MAGNESIUM SERPL-MCNC: 2.4 MG/DL — SIGNIFICANT CHANGE UP (ref 1.6–2.6)
POTASSIUM SERPL-MCNC: 3.9 MMOL/L — SIGNIFICANT CHANGE UP (ref 3.5–5.3)
POTASSIUM SERPL-SCNC: 3.9 MMOL/L — SIGNIFICANT CHANGE UP (ref 3.5–5.3)
SARS-COV-2 RNA SPEC QL NAA+PROBE: SIGNIFICANT CHANGE UP
SODIUM SERPL-SCNC: 137 MMOL/L — SIGNIFICANT CHANGE UP (ref 135–145)

## 2021-02-03 RX ADMIN — APIXABAN 2.5 MILLIGRAM(S): 2.5 TABLET, FILM COATED ORAL at 17:05

## 2021-02-03 RX ADMIN — ATORVASTATIN CALCIUM 20 MILLIGRAM(S): 80 TABLET, FILM COATED ORAL at 21:58

## 2021-02-03 RX ADMIN — Medication 50 MILLIGRAM(S): at 05:33

## 2021-02-03 RX ADMIN — Medication 50 MICROGRAM(S): at 05:33

## 2021-02-03 RX ADMIN — Medication 40 MILLIGRAM(S): at 17:05

## 2021-02-03 RX ADMIN — Medication 50 MILLIGRAM(S): at 17:05

## 2021-02-03 RX ADMIN — Medication 81 MILLIGRAM(S): at 12:13

## 2021-02-03 RX ADMIN — Medication 80 MILLIGRAM(S): at 05:35

## 2021-02-03 RX ADMIN — ISOSORBIDE MONONITRATE 30 MILLIGRAM(S): 60 TABLET, EXTENDED RELEASE ORAL at 12:13

## 2021-02-03 RX ADMIN — APIXABAN 2.5 MILLIGRAM(S): 2.5 TABLET, FILM COATED ORAL at 05:33

## 2021-02-03 RX ADMIN — FINASTERIDE 5 MILLIGRAM(S): 5 TABLET, FILM COATED ORAL at 12:13

## 2021-02-03 NOTE — PROGRESS NOTE ADULT - SUBJECTIVE AND OBJECTIVE BOX
Patient is a 89y old  Male who presents with a chief complaint of 89M p/w b/l LE swelling (03 Feb 2021 08:26)      SUBJECTIVE / OVERNIGHT EVENTS: Comfortable without new complaints.   Review of Systems  chest pain no  palpitations no  sob no  nausea no  headache no    MEDICATIONS  (STANDING):  apixaban 2.5 milliGRAM(s) Oral two times a day  aspirin enteric coated 81 milliGRAM(s) Oral daily  atorvastatin 20 milliGRAM(s) Oral at bedtime  finasteride 5 milliGRAM(s) Oral daily  furosemide    Tablet 80 milliGRAM(s) Oral <User Schedule>  furosemide    Tablet 40 milliGRAM(s) Oral <User Schedule>  isosorbide   mononitrate ER Tablet (IMDUR) 30 milliGRAM(s) Oral daily  levothyroxine 50 MICROGram(s) Oral daily  metoprolol tartrate 50 milliGRAM(s) Oral two times a day    MEDICATIONS  (PRN):      Vital Signs Last 24 Hrs  T(C): 36.6 (03 Feb 2021 12:09), Max: 36.7 (02 Feb 2021 21:14)  T(F): 97.8 (03 Feb 2021 12:09), Max: 98 (02 Feb 2021 21:14)  HR: 70 (03 Feb 2021 17:04) (60 - 70)  BP: 135/61 (03 Feb 2021 17:04) (109/63 - 135/73)  BP(mean): --  RR: 18 (03 Feb 2021 12:09) (18 - 18)  SpO2: 93% (03 Feb 2021 12:09) (93% - 95%)    PHYSICAL EXAM:  GENERAL: NAD, well-developed  HEAD:  Atraumatic, Normocephalic  EYES: EOMI, PERRLA, conjunctiva and sclera clear  NECK: Supple, No JVD  CHEST/LUNG: Clear to auscultation bilaterally; No wheeze  HEART: Regular rate and rhythm; No murmurs, rubs, or gallops  ABDOMEN: Soft, Nontender, Nondistended; Bowel sounds present  EXTREMITIES:  2+ Peripheral Pulses, No clubbing, cyanosis, or edema  PSYCH: AAOx3  NEUROLOGY: non-focal  SKIN: No rashes or lesions    LABS:    02-03    137  |  99  |  47<H>  ----------------------------<  87  3.9   |  25  |  1.99<H>    Ca    8.6      03 Feb 2021 05:35  Mg     2.4     02-03                  RADIOLOGY & ADDITIONAL TESTS:    Imaging Personally Reviewed:    Consultant(s) Notes Reviewed:      Care Discussed with Consultants/Other Providers:

## 2021-02-03 NOTE — PROGRESS NOTE ADULT - SUBJECTIVE AND OBJECTIVE BOX
Overnight events noted   VITAL: T(C): , Max: 36.7 (02-02-21 @ 21:14) T(F): , Max: 98 (02-02-21 @ 21:14) HR: 60 (02-03-21 @ 03:51) BP: 135/73 (02-03-21 @ 03:51) BP(mean): -- RR: 18 (02-03-21 @ 03:51) SpO2: 95% (02-03-21 @ 03:51)   PHYSICAL EXAM: Constitutional: NAD, Alert HEENT: NCAT, DMM Neck: Supple, No JVD Respiratory: CTA-b/l Cardiovascular: RRR s1s2, no m/r/g Gastrointestinal: BS+, soft, NT/ND Extremities: 1+ b/l LE edema Neurological: no focal deficits; strength grossly intact Back: no CVAT b/l Skin: No rashes, no nevi   LABS:  Na(137)/K(3.9)/Cl(99)/HCO3(25)/BUN(47)/Cr(1.99)Glu(87)/Ca(8.6)/Mg(2.4)/PO4(--)    02-03 @ 05:35 Na(135)/K(3.8)/Cl(98)/HCO3(24)/BUN(48)/Cr(2.05)Glu(92)/Ca(9.0)/Mg(2.3)/PO4(--)    02-02 @ 06:29 Na(135)/K(3.8)/Cl(100)/HCO3(22)/BUN(53)/Cr(2.25)Glu(108)/Ca(8.9)/Mg(2.4)/PO4(--)    02-01 @ 05:18   IMPRESSION: 89M w/ HTN, CAD, AFib, HFpEF, and CKD 1/30/21 a/w acute on chronic HFpEF  (1)Renal - nonproteinuric 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 GFR ~20-30ml/min (2)Lytes - acceptable (3)CV - resolving/resolved CHF flare (4)Cardiac - reversible defects on stress 11/2020. Although there is risk of RJ here if patient were to go for cath, I would estimate the risk of being rendered chronically dialysis dependent from cath to be quite low (<1% here).    RECOMMEND: (1)I would not object to cardiac cath here. If for cath, would (a)hold diuretics and (b)administer NS 100cc/h x 5 hours periprocedurally (starting at least 2 hours prior to cath) to minimize MANJEET risk  discussed with Cardiology Dr. Padgett.   Lonny Dickey MD [facility] Office: [phone] Cell: [phone]        No pain, no sob   VITAL: T(C): , Max: 36.7 (02-02-21 @ 21:14) T(F): , Max: 98 (02-02-21 @ 21:14) HR: 60 (02-03-21 @ 03:51) BP: 135/73 (02-03-21 @ 03:51) BP(mean): -- RR: 18 (02-03-21 @ 03:51) SpO2: 95% (02-03-21 @ 03:51)   PHYSICAL EXAM: Constitutional: NAD, Alert HEENT: NCAT, DMM Neck: Supple, No JVD Respiratory: CTA-b/l Cardiovascular: RRR s1s2, no m/r/g Gastrointestinal: BS+, soft, NT/ND Extremities: 1+ b/l LE edema Neurological: no focal deficits; strength grossly intact Back: no CVAT b/l Skin: No rashes, no nevi   LABS:  Na(137)/K(3.9)/Cl(99)/HCO3(25)/BUN(47)/Cr(1.99)Glu(87)/Ca(8.6)/Mg(2.4)/PO4(--)    02-03 @ 05:35 Na(135)/K(3.8)/Cl(98)/HCO3(24)/BUN(48)/Cr(2.05)Glu(92)/Ca(9.0)/Mg(2.3)/PO4(--)    02-02 @ 06:29 Na(135)/K(3.8)/Cl(100)/HCO3(22)/BUN(53)/Cr(2.25)Glu(108)/Ca(8.9)/Mg(2.4)/PO4(--)    02-01 @ 05:18   IMPRESSION: 89M w/ HTN, CAD, AFib, HFpEF, and CKD 1/30/21 a/w acute on chronic HFpEF  (1)Renal - nonproteinuric 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 GFR ~20-30ml/min (2)Lytes - acceptable (3)CV - resolving/resolved CHF flare (4)Cardiac - reversible defects on stress 11/2020. Although there is risk of RJ here if patient were to go for cath, I would estimate the risk of being rendered chronically dialysis dependent from cath to be quite low (<1% here).    RECOMMEND: (1)I would not object to cardiac cath here. If for cath, would (a)hold diuretics and (b)administer NS 100cc/h x 5 hours periprocedurally (starting at least 2 hours prior to cath) to minimize MANJEET risk  discussed with Cardiology Dr. Padgett.   Lonny Dickey MD [facility] Office: [phone] Cell: [phone]

## 2021-02-04 LAB
ANION GAP SERPL CALC-SCNC: 14 MMOL/L — SIGNIFICANT CHANGE UP (ref 5–17)
BUN SERPL-MCNC: 48 MG/DL — HIGH (ref 7–23)
CALCIUM SERPL-MCNC: 9.2 MG/DL — SIGNIFICANT CHANGE UP (ref 8.4–10.5)
CHLORIDE SERPL-SCNC: 100 MMOL/L — SIGNIFICANT CHANGE UP (ref 96–108)
CO2 SERPL-SCNC: 23 MMOL/L — SIGNIFICANT CHANGE UP (ref 22–31)
CREAT SERPL-MCNC: 1.88 MG/DL — HIGH (ref 0.5–1.3)
GLUCOSE SERPL-MCNC: 93 MG/DL — SIGNIFICANT CHANGE UP (ref 70–99)
MAGNESIUM SERPL-MCNC: 2.3 MG/DL — SIGNIFICANT CHANGE UP (ref 1.6–2.6)
POTASSIUM SERPL-MCNC: 3.9 MMOL/L — SIGNIFICANT CHANGE UP (ref 3.5–5.3)
POTASSIUM SERPL-SCNC: 3.9 MMOL/L — SIGNIFICANT CHANGE UP (ref 3.5–5.3)
SODIUM SERPL-SCNC: 137 MMOL/L — SIGNIFICANT CHANGE UP (ref 135–145)

## 2021-02-04 RX ADMIN — Medication 50 MILLIGRAM(S): at 05:48

## 2021-02-04 RX ADMIN — FINASTERIDE 5 MILLIGRAM(S): 5 TABLET, FILM COATED ORAL at 11:17

## 2021-02-04 RX ADMIN — Medication 80 MILLIGRAM(S): at 05:48

## 2021-02-04 RX ADMIN — Medication 50 MICROGRAM(S): at 05:48

## 2021-02-04 RX ADMIN — ISOSORBIDE MONONITRATE 30 MILLIGRAM(S): 60 TABLET, EXTENDED RELEASE ORAL at 11:17

## 2021-02-04 RX ADMIN — Medication 81 MILLIGRAM(S): at 11:17

## 2021-02-04 RX ADMIN — Medication 50 MILLIGRAM(S): at 17:39

## 2021-02-04 RX ADMIN — ATORVASTATIN CALCIUM 20 MILLIGRAM(S): 80 TABLET, FILM COATED ORAL at 22:15

## 2021-02-04 RX ADMIN — APIXABAN 2.5 MILLIGRAM(S): 2.5 TABLET, FILM COATED ORAL at 05:48

## 2021-02-04 RX ADMIN — Medication 40 MILLIGRAM(S): at 17:39

## 2021-02-04 RX ADMIN — APIXABAN 2.5 MILLIGRAM(S): 2.5 TABLET, FILM COATED ORAL at 17:39

## 2021-02-04 NOTE — PROGRESS NOTE ADULT - SUBJECTIVE AND OBJECTIVE BOX
Patient is a 89y old  Male who presents with a chief complaint of 89M p/w b/l LE swelling (04 Feb 2021 11:02)      SUBJECTIVE / OVERNIGHT EVENTS: Comfortable without new complaints.   Review of Systems  chest pain no  palpitations no  sob improving   nausea no  headache no    MEDICATIONS  (STANDING):  apixaban 2.5 milliGRAM(s) Oral two times a day  aspirin enteric coated 81 milliGRAM(s) Oral daily  atorvastatin 20 milliGRAM(s) Oral at bedtime  finasteride 5 milliGRAM(s) Oral daily  furosemide    Tablet 80 milliGRAM(s) Oral <User Schedule>  furosemide    Tablet 40 milliGRAM(s) Oral <User Schedule>  isosorbide   mononitrate ER Tablet (IMDUR) 30 milliGRAM(s) Oral daily  levothyroxine 50 MICROGram(s) Oral daily  metoprolol tartrate 50 milliGRAM(s) Oral two times a day    MEDICATIONS  (PRN):      Vital Signs Last 24 Hrs  T(C): 36.3 (04 Feb 2021 13:08), Max: 36.7 (04 Feb 2021 04:33)  T(F): 97.4 (04 Feb 2021 13:08), Max: 98.1 (04 Feb 2021 04:33)  HR: 62 (04 Feb 2021 17:37) (60 - 62)  BP: 134/78 (04 Feb 2021 17:37) (114/68 - 165/75)  BP(mean): --  RR: 18 (04 Feb 2021 13:08) (18 - 18)  SpO2: 93% (04 Feb 2021 13:08) (93% - 94%)    PHYSICAL EXAM:  GENERAL: NAD, well-developed  HEAD:  Atraumatic, Normocephalic  EYES: EOMI, PERRLA, conjunctiva and sclera clear  NECK: Supple, No JVD  CHEST/LUNG: Clear to auscultation bilaterally; No wheeze  HEART: Regular rate and rhythm; No murmurs, rubs, or gallops  ABDOMEN: Soft, Nontender, Nondistended; Bowel sounds present  EXTREMITIES:  2+ Peripheral Pulses, No clubbing, cyanosis, or edema  PSYCH: AAOx3  NEUROLOGY: non-focal  SKIN: No rashes or lesions    LABS:    02-04    137  |  100  |  48<H>  ----------------------------<  93  3.9   |  23  |  1.88<H>    Ca    9.2      04 Feb 2021 06:04  Mg     2.3     02-04                  RADIOLOGY & ADDITIONAL TESTS:    Imaging Personally Reviewed:    Consultant(s) Notes Reviewed:      Care Discussed with Consultants/Other Providers:

## 2021-02-04 NOTE — PROGRESS NOTE ADULT - SUBJECTIVE AND OBJECTIVE BOX
Overnight events noted   VITAL: T(C): , Max: 36.7 (02-04-21 @ 04:33) T(F): , Max: 98.1 (02-04-21 @ 04:33) HR: 61 (02-04-21 @ 04:33) BP: 165/75 (02-04-21 @ 04:33) RR: 18 (02-04-21 @ 04:33) SpO2: 94% (02-04-21 @ 04:33)   PHYSICAL EXAM: Constitutional: NAD, Alert HEENT: NCAT, DMM Neck: Supple, No JVD Respiratory: CTA-b/l Cardiovascular: RRR s1s2, no m/r/g Gastrointestinal: BS+, soft, NT/ND Extremities: 1+ b/l LE edema Neurological: no focal deficits; strength grossly intact Back: no CVAT b/l Skin: No rashes, no nevi  LABS:  Na(137)/K(3.9)/Cl(100)/HCO3(23)/BUN(48)/Cr(1.88)Glu(93)/Ca(9.2)/Mg(2.3)/PO4(--)    02-04 @ 06:04 Na(137)/K(3.9)/Cl(99)/HCO3(25)/BUN(47)/Cr(1.99)Glu(87)/Ca(8.6)/Mg(2.4)/PO4(--)    02-03 @ 05:35 Na(135)/K(3.8)/Cl(98)/HCO3(24)/BUN(48)/Cr(2.05)Glu(92)/Ca(9.0)/Mg(2.3)/PO4(--)    02-02 @ 06:29   IMPRESSION: 89M w/ HTN, CAD, AFib, HFpEF, and CKD 1/30/21 a/w acute on chronic HFpEF  (1)Renal - nonproteinuric 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 GFR ~20-30ml/min (2)Lytes - acceptable (3)CV - resolving/resolved CHF flare (4)Cardiac - reversible defects on stress 11/2020. Although there is risk of RJ here if patient were to go for cath, I would estimate the risk of being rendered chronically dialysis dependent from cath to be quite low (<1% here).    RECOMMEND: (1)I would not object to cardiac cath here. If for cath, would (a)hold diuretics and (b)administer NS 100cc/h x 5 hours periprocedurally (starting at least 2 hours prior to cath) to minimize MANJEET risk       Lonny Dickey MD [facility] Office: [phone] Cell: [phone]        Overnight events noted   VITAL: T(C): , Max: 36.7 (02-04-21 @ 04:33) T(F): , Max: 98.1 (02-04-21 @ 04:33) HR: 61 (02-04-21 @ 04:33) BP: 165/75 (02-04-21 @ 04:33) RR: 18 (02-04-21 @ 04:33) SpO2: 94% (02-04-21 @ 04:33)   PHYSICAL EXAM: Constitutional: NAD, Alert HEENT: NCAT, DMM Neck: Supple, No JVD Respiratory: CTA-b/l Cardiovascular: RRR s1s2, no m/r/g Gastrointestinal: BS+, soft, NT/ND Extremities: 1+ b/l LE edema Neurological: no focal deficits; strength grossly intact Back: no CVAT b/l Skin: No rashes, no nevi  LABS:  Na(137)/K(3.9)/Cl(100)/HCO3(23)/BUN(48)/Cr(1.88)Glu(93)/Ca(9.2)/Mg(2.3)/PO4(--)    02-04 @ 06:04 Na(137)/K(3.9)/Cl(99)/HCO3(25)/BUN(47)/Cr(1.99)Glu(87)/Ca(8.6)/Mg(2.4)/PO4(--)    02-03 @ 05:35 Na(135)/K(3.8)/Cl(98)/HCO3(24)/BUN(48)/Cr(2.05)Glu(92)/Ca(9.0)/Mg(2.3)/PO4(--)    02-02 @ 06:29   IMPRESSION: 89M w/ HTN, CAD, AFib, HFpEF, and CKD 1/30/21 a/w acute on chronic HFpEF  (1)Renal - nonproteinuric 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 GFR ~20-30ml/min (2)Lytes - acceptable (3)CV - resolving/resolved CHF flare    RECOMMEND: (1)PO Lasix as ordered (2)No renal objection to discharge - could f/u at my office in 1-2 weeks after discharge      Lonny Dickey MD [facility] Office: [phone] Cell: [phone]        no pain, no sob   VITAL: T(C): , Max: 36.7 (02-04-21 @ 04:33) T(F): , Max: 98.1 (02-04-21 @ 04:33) HR: 61 (02-04-21 @ 04:33) BP: 165/75 (02-04-21 @ 04:33) RR: 18 (02-04-21 @ 04:33) SpO2: 94% (02-04-21 @ 04:33)   PHYSICAL EXAM: Constitutional: NAD, Alert HEENT: NCAT, DMM Neck: Supple, No JVD Respiratory: CTA-b/l Cardiovascular: RRR s1s2, no m/r/g Gastrointestinal: BS+, soft, NT/ND Extremities: 1+ b/l LE edema Neurological: no focal deficits; strength grossly intact Back: no CVAT b/l Skin: No rashes, no nevi  LABS:  Na(137)/K(3.9)/Cl(100)/HCO3(23)/BUN(48)/Cr(1.88)Glu(93)/Ca(9.2)/Mg(2.3)/PO4(--)    02-04 @ 06:04 Na(137)/K(3.9)/Cl(99)/HCO3(25)/BUN(47)/Cr(1.99)Glu(87)/Ca(8.6)/Mg(2.4)/PO4(--)    02-03 @ 05:35 Na(135)/K(3.8)/Cl(98)/HCO3(24)/BUN(48)/Cr(2.05)Glu(92)/Ca(9.0)/Mg(2.3)/PO4(--)    02-02 @ 06:29   IMPRESSION: 89M w/ HTN, CAD, AFib, HFpEF, and CKD 1/30/21 a/w acute on chronic HFpEF  (1)Renal - nonproteinuric 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 GFR ~20-30ml/min (2)Lytes - acceptable (3)CV - resolving/resolved CHF flare    RECOMMEND: (1)PO Lasix as ordered (2)No renal objection to discharge - could f/u at my office in 1-2 weeks after discharge      MD [facility] Office: [phone] Cell: [phone]

## 2021-02-05 ENCOUNTER — TRANSCRIPTION ENCOUNTER (OUTPATIENT)
Age: 86
End: 2021-02-05

## 2021-02-05 VITALS
RESPIRATION RATE: 18 BRPM | SYSTOLIC BLOOD PRESSURE: 139 MMHG | OXYGEN SATURATION: 93 % | DIASTOLIC BLOOD PRESSURE: 62 MMHG | HEART RATE: 76 BPM

## 2021-02-05 LAB
ANION GAP SERPL CALC-SCNC: 15 MMOL/L — SIGNIFICANT CHANGE UP (ref 5–17)
BUN SERPL-MCNC: 44 MG/DL — HIGH (ref 7–23)
CALCIUM SERPL-MCNC: 9.2 MG/DL — SIGNIFICANT CHANGE UP (ref 8.4–10.5)
CHLORIDE SERPL-SCNC: 99 MMOL/L — SIGNIFICANT CHANGE UP (ref 96–108)
CO2 SERPL-SCNC: 26 MMOL/L — SIGNIFICANT CHANGE UP (ref 22–31)
CREAT SERPL-MCNC: 1.89 MG/DL — HIGH (ref 0.5–1.3)
GLUCOSE SERPL-MCNC: 74 MG/DL — SIGNIFICANT CHANGE UP (ref 70–99)
MAGNESIUM SERPL-MCNC: 2.2 MG/DL — SIGNIFICANT CHANGE UP (ref 1.6–2.6)
POTASSIUM SERPL-MCNC: 3.9 MMOL/L — SIGNIFICANT CHANGE UP (ref 3.5–5.3)
POTASSIUM SERPL-SCNC: 3.9 MMOL/L — SIGNIFICANT CHANGE UP (ref 3.5–5.3)
SODIUM SERPL-SCNC: 140 MMOL/L — SIGNIFICANT CHANGE UP (ref 135–145)

## 2021-02-05 PROCEDURE — 83735 ASSAY OF MAGNESIUM: CPT

## 2021-02-05 PROCEDURE — 71045 X-RAY EXAM CHEST 1 VIEW: CPT

## 2021-02-05 PROCEDURE — 84443 ASSAY THYROID STIM HORMONE: CPT

## 2021-02-05 PROCEDURE — 80053 COMPREHEN METABOLIC PANEL: CPT

## 2021-02-05 PROCEDURE — 97162 PT EVAL MOD COMPLEX 30 MIN: CPT

## 2021-02-05 PROCEDURE — 82947 ASSAY GLUCOSE BLOOD QUANT: CPT

## 2021-02-05 PROCEDURE — 82803 BLOOD GASES ANY COMBINATION: CPT

## 2021-02-05 PROCEDURE — 82330 ASSAY OF CALCIUM: CPT

## 2021-02-05 PROCEDURE — 76770 US EXAM ABDO BACK WALL COMP: CPT

## 2021-02-05 PROCEDURE — 85018 HEMOGLOBIN: CPT

## 2021-02-05 PROCEDURE — 84132 ASSAY OF SERUM POTASSIUM: CPT

## 2021-02-05 PROCEDURE — 85014 HEMATOCRIT: CPT

## 2021-02-05 PROCEDURE — 99285 EMERGENCY DEPT VISIT HI MDM: CPT | Mod: 25

## 2021-02-05 PROCEDURE — 84484 ASSAY OF TROPONIN QUANT: CPT

## 2021-02-05 PROCEDURE — 86769 SARS-COV-2 COVID-19 ANTIBODY: CPT

## 2021-02-05 PROCEDURE — 96374 THER/PROPH/DIAG INJ IV PUSH: CPT

## 2021-02-05 PROCEDURE — 83880 ASSAY OF NATRIURETIC PEPTIDE: CPT

## 2021-02-05 PROCEDURE — 82435 ASSAY OF BLOOD CHLORIDE: CPT

## 2021-02-05 PROCEDURE — 84295 ASSAY OF SERUM SODIUM: CPT

## 2021-02-05 PROCEDURE — 85025 COMPLETE CBC W/AUTO DIFF WBC: CPT

## 2021-02-05 PROCEDURE — 85610 PROTHROMBIN TIME: CPT

## 2021-02-05 PROCEDURE — 82962 GLUCOSE BLOOD TEST: CPT

## 2021-02-05 PROCEDURE — 85027 COMPLETE CBC AUTOMATED: CPT

## 2021-02-05 PROCEDURE — 82553 CREATINE MB FRACTION: CPT

## 2021-02-05 PROCEDURE — 93005 ELECTROCARDIOGRAM TRACING: CPT

## 2021-02-05 PROCEDURE — 97116 GAIT TRAINING THERAPY: CPT

## 2021-02-05 PROCEDURE — 83605 ASSAY OF LACTIC ACID: CPT

## 2021-02-05 PROCEDURE — 71250 CT THORAX DX C-: CPT

## 2021-02-05 PROCEDURE — U0005: CPT

## 2021-02-05 PROCEDURE — U0003: CPT

## 2021-02-05 PROCEDURE — 97530 THERAPEUTIC ACTIVITIES: CPT

## 2021-02-05 PROCEDURE — 80048 BASIC METABOLIC PNL TOTAL CA: CPT

## 2021-02-05 PROCEDURE — G0378: CPT

## 2021-02-05 PROCEDURE — 85730 THROMBOPLASTIN TIME PARTIAL: CPT

## 2021-02-05 PROCEDURE — 82550 ASSAY OF CK (CPK): CPT

## 2021-02-05 RX ORDER — METOPROLOL TARTRATE 50 MG
1 TABLET ORAL
Qty: 0 | Refills: 0 | DISCHARGE

## 2021-02-05 RX ORDER — FUROSEMIDE 40 MG
1 TABLET ORAL
Qty: 0 | Refills: 0 | DISCHARGE
Start: 2021-02-05

## 2021-02-05 RX ORDER — METOPROLOL TARTRATE 50 MG
1 TABLET ORAL
Qty: 0 | Refills: 0 | DISCHARGE
Start: 2021-02-05

## 2021-02-05 RX ORDER — BUMETANIDE 0.25 MG/ML
1 INJECTION INTRAMUSCULAR; INTRAVENOUS
Qty: 0 | Refills: 0 | DISCHARGE

## 2021-02-05 RX ADMIN — Medication 50 MILLIGRAM(S): at 05:58

## 2021-02-05 RX ADMIN — Medication 50 MILLIGRAM(S): at 17:24

## 2021-02-05 RX ADMIN — Medication 80 MILLIGRAM(S): at 05:58

## 2021-02-05 RX ADMIN — APIXABAN 2.5 MILLIGRAM(S): 2.5 TABLET, FILM COATED ORAL at 05:58

## 2021-02-05 RX ADMIN — ISOSORBIDE MONONITRATE 30 MILLIGRAM(S): 60 TABLET, EXTENDED RELEASE ORAL at 13:15

## 2021-02-05 RX ADMIN — Medication 50 MICROGRAM(S): at 05:58

## 2021-02-05 RX ADMIN — Medication 81 MILLIGRAM(S): at 13:15

## 2021-02-05 RX ADMIN — APIXABAN 2.5 MILLIGRAM(S): 2.5 TABLET, FILM COATED ORAL at 17:24

## 2021-02-05 RX ADMIN — Medication 40 MILLIGRAM(S): at 17:24

## 2021-02-05 RX ADMIN — FINASTERIDE 5 MILLIGRAM(S): 5 TABLET, FILM COATED ORAL at 13:15

## 2021-02-05 NOTE — PROGRESS NOTE ADULT - PROVIDER SPECIALTY LIST ADULT
Internal Medicine
Nephrology
Nephrology
Internal Medicine
Internal Medicine
Nephrology
Internal Medicine
Electrophysiology

## 2021-02-05 NOTE — PROGRESS NOTE ADULT - SUBJECTIVE AND OBJECTIVE BOX
Patient is a 89y old  Male who presents with a chief complaint of 89M p/w b/l LE swelling (05 Feb 2021 11:34)      SUBJECTIVE / OVERNIGHT EVENTS: Comfortable without new complaints.   Review of Systems  chest pain no  palpitations no  sob no  nausea no  headache no    MEDICATIONS  (STANDING):  apixaban 2.5 milliGRAM(s) Oral two times a day  aspirin enteric coated 81 milliGRAM(s) Oral daily  atorvastatin 20 milliGRAM(s) Oral at bedtime  finasteride 5 milliGRAM(s) Oral daily  furosemide    Tablet 80 milliGRAM(s) Oral <User Schedule>  furosemide    Tablet 40 milliGRAM(s) Oral <User Schedule>  isosorbide   mononitrate ER Tablet (IMDUR) 30 milliGRAM(s) Oral daily  levothyroxine 50 MICROGram(s) Oral daily  metoprolol tartrate 50 milliGRAM(s) Oral two times a day    MEDICATIONS  (PRN):      Vital Signs Last 24 Hrs  T(C): 36.6 (05 Feb 2021 12:37), Max: 36.7 (04 Feb 2021 20:49)  T(F): 97.9 (05 Feb 2021 12:37), Max: 98.1 (04 Feb 2021 20:49)  HR: 61 (05 Feb 2021 12:37) (60 - 62)  BP: 167/80 (05 Feb 2021 12:37) (130/78 - 167/80)  BP(mean): --  RR: 18 (05 Feb 2021 12:37) (18 - 18)  SpO2: 94% (05 Feb 2021 04:20) (93% - 96%)    PHYSICAL EXAM:  GENERAL: NAD, well-developed  HEAD:  Atraumatic, Normocephalic  EYES: EOMI, PERRLA, conjunctiva and sclera clear  NECK: Supple, No JVD  CHEST/LUNG: Clear to auscultation bilaterally; No wheeze  HEART: Regular rate and rhythm; No murmurs, rubs, or gallops  ABDOMEN: Soft, Nontender, Nondistended; Bowel sounds present  EXTREMITIES:  2+ Peripheral Pulses, No clubbing, cyanosis, or edema  PSYCH: AAOx3  NEUROLOGY: non-focal  SKIN: No rashes or lesions    LABS:    02-05    140  |  99  |  44<H>  ----------------------------<  74  3.9   |  26  |  1.89<H>    Ca    9.2      05 Feb 2021 07:17  Mg     2.2     02-05                  RADIOLOGY & ADDITIONAL TESTS:    Imaging Personally Reviewed:    Consultant(s) Notes Reviewed:      Care Discussed with Consultants/Other Providers:

## 2021-02-05 NOTE — PROGRESS NOTE ADULT - SUBJECTIVE AND OBJECTIVE BOX
Overnight events noted   VITAL: T(C): , Max: 36.7 (02-04-21 @ 20:49) T(F): , Max: 98.1 (02-04-21 @ 20:49) HR: 60 (02-05-21 @ 04:20) BP: 144/72 (02-05-21 @ 04:20) BP(mean): -- RR: 18 (02-05-21 @ 04:20) SpO2: 94% (02-05-21 @ 04:20)   PHYSICAL EXAM: Constitutional: NAD, Alert HEENT: NCAT, DMM Neck: Supple, No JVD Respiratory: CTA-b/l Cardiovascular: RRR s1s2, no m/r/g Gastrointestinal: BS+, soft, NT/ND Extremities: 1+ b/l LE edema Neurological: no focal deficits; strength grossly intact Back: no CVAT b/l Skin: No rashes, no nevi   LABS:  Na(140)/K(3.9)/Cl(99)/HCO3(26)/BUN(44)/Cr(1.89)Glu(74)/Ca(9.2)/Mg(2.2)/PO4(--)    02-05 @ 07:17 Na(137)/K(3.9)/Cl(100)/HCO3(23)/BUN(48)/Cr(1.88)Glu(93)/Ca(9.2)/Mg(2.3)/PO4(--)    02-04 @ 06:04 Na(137)/K(3.9)/Cl(99)/HCO3(25)/BUN(47)/Cr(1.99)Glu(87)/Ca(8.6)/Mg(2.4)/PO4(--)    02-03 @ 05:35   IMPRESSION: 89M w/ HTN, CAD, AFib, HFpEF, and CKD 1/30/21 a/w acute on chronic HFpEF  (1)Renal - nonproteinuric 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 GFR ~20-30ml/min (2)Lytes - acceptable (3)CV - resolved CHF flare   RECOMMEND: (1)PO Lasix as ordered (2)No renal objection to discharge - could f/u at my office 2-6 weeks after discharge        Lonny Dickey MD [facility] Office: [phone] Cell: [phone]        No complaints  VITAL: T(C): , Max: 36.7 (02-04-21 @ 20:49) T(F): , Max: 98.1 (02-04-21 @ 20:49) HR: 60 (02-05-21 @ 04:20) BP: 144/72 (02-05-21 @ 04:20) BP(mean): -- RR: 18 (02-05-21 @ 04:20) SpO2: 94% (02-05-21 @ 04:20)   PHYSICAL EXAM: Constitutional: NAD, Alert HEENT: NCAT, DMM Neck: Supple, No JVD Respiratory: CTA-b/l Cardiovascular: RRR s1s2, no m/r/g Gastrointestinal: BS+, soft, NT/ND Extremities: 1+ b/l LE edema Neurological: no focal deficits; strength grossly intact Back: no CVAT b/l Skin: No rashes, no nevi   LABS:  Na(140)/K(3.9)/Cl(99)/HCO3(26)/BUN(44)/Cr(1.89)Glu(74)/Ca(9.2)/Mg(2.2)/PO4(--)    02-05 @ 07:17 Na(137)/K(3.9)/Cl(100)/HCO3(23)/BUN(48)/Cr(1.88)Glu(93)/Ca(9.2)/Mg(2.3)/PO4(--)    02-04 @ 06:04 Na(137)/K(3.9)/Cl(99)/HCO3(25)/BUN(47)/Cr(1.99)Glu(87)/Ca(8.6)/Mg(2.4)/PO4(--)    02-03 @ 05:35   IMPRESSION: 89M w/ HTN, CAD, AFib, HFpEF, and CKD 1/30/21 a/w acute on chronic HFpEF  (1)Renal - nonproteinuric 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 GFR ~20-30ml/min (2)Lytes - acceptable (3)CV - resolved CHF flare   RECOMMEND: (1)PO Lasix as ordered (2)No renal objection to discharge - could f/u at my office 2-6 weeks after discharge        Lonny T. Dickey, MD [facility] Office: [phone] Cell: [phone]

## 2021-02-05 NOTE — DISCHARGE NOTE NURSING/CASE MANAGEMENT/SOCIAL WORK - PATIENT PORTAL LINK FT
You can access the FollowMyHealth Patient Portal offered by [facility] by registering at the following website: [email or web address]. By joining [employer]’s FollowMyHealth portal, you will also be able to view your health information using other applications (apps) compatible with our system.

## 2021-02-05 NOTE — PROGRESS NOTE ADULT - ASSESSMENT
89M w/ HFpEF, CAD, Afib on eliquis, SSS s/p PPM, CKD4, HTN presents for evaluation of b/l LE swelling admit to medicine for acute on chronic HFpEF.    Acute on chronic diastolic heart failure.   - c/w lasix 40mg IV daily  - metoprolol 50BID, imdur 30  - cardiology consult.     Acute kidney injury superimposed on chronic kidney disease.  - Nephrology evaluation  - decrease Lasix to 40 mg daily.    Longstanding persistent atrial fibrillation.   - c/w metoprolol  - Hold eliquis due to rj. Restart if no obstruction     CAD  - medical Rx  - cardiology evaluation  - high risk for cath    Hypothyroidism (acquired).  - c/w synthroid.   - Follow TSH    Essential hypertension.    - c/w metoprolol, isosorbide, lasix as above.     Enlarged prostate.   - c/w finasteride.     Prophylactic measure.  - HOLD eliquis given RJ, reevaluate restarting based upon CrCl if no urinary obstruction    Kofi Sanchez MD pager [phone] 
89M w/ HFpEF, CAD, Afib on eliquis, SSS s/p PPM, CKD4, HTN presents for evaluation of b/l LE swelling admit to medicine for acute on chronic HFpEF.    Acute on chronic diastolic heart failure.   - c/w lasix 80/40  - metoprolol 50BID, imdur 30  - cardiology follow.     Acute kidney injury superimposed on chronic kidney disease.  - Nephrology evaluation  - decrease Lasix to 40 mg daily.    Longstanding persistent atrial fibrillation.   - c/w metoprolol  - continue eliquis.    CAD  - medical Rx  - cardiology evaluation  - high risk for cath    Hypothyroidism (acquired).  - c/w synthroid.   - Follow TSH    Essential hypertension.    - c/w metoprolol, isosorbide, lasix as above.     Enlarged prostate.   - c/w finasteride.     Prophylactic measure.  - eliquis      DCP to rehab in progress.    Kofi Sanchez MD pager [phone] 
89M w/ HFpEF, CAD, Afib on eliquis, SSS s/p PPM, CKD4, HTN presents for evaluation of b/l LE swelling admit to medicine for acute on chronic HFpEF.    Acute on chronic diastolic heart failure.   - c/w lasix 40mg IV daily  - metoprolol 50BID, imdur 30  - cardiology consult.     Acute kidney injury superimposed on chronic kidney disease.  - renal us pending   - maybe associated with heart failure, need to r/o obstructive path.     Longstanding persistent atrial fibrillation.   - c/w metoprolol  - Hold eliquis due to rj. Restart if no obstruction     CAD  - medical Rx  - cardiology evaluation  - high risk for cath    Hypothyroidism (acquired).  - c/w synthroid.   - Follow TSH    Essential hypertension.    - c/w metoprolol, isosorbide, lasix as above.     Enlarged prostate.   - c/w finasteride.     Prophylactic measure.  - HOLD eliquis given RJ, reevaluate restarting based upon CrCl if no urinary obstruction    Kofi Sanchez MD pager [phone] 
89M w/ HFpEF, CAD, Afib on eliquis, SSS s/p PPM, CKD4, HTN presents for evaluation of b/l LE swelling admit to medicine for acute on chronic HFpEF.    Acute on chronic diastolic heart failure.   - c/w lasix 80/40  - metoprolol 50BID, imdur 30  - cardiology follow.     Acute kidney injury superimposed on chronic kidney disease.  - Nephrology evaluation  - decrease Lasix to 80/ 40 mg daily.    Longstanding persistent atrial fibrillation.   - c/w metoprolol  - continue eliquis.    CAD  - medical Rx  - cardiology evaluation  - high risk for cath    Hypothyroidism (acquired).  - c/w synthroid.   - Follow TSH    Essential hypertension.    - c/w metoprolol, isosorbide, lasix as above.     Enlarged prostate.   - c/w finasteride.     Prophylactic measure.  - eliquis      DC to rehab.    Kofi Sanchez MD pager [phone] 
89M w/ HFpEF, CAD, Afib on eliquis, SSS s/p PPM, CKD4, HTN presents for evaluation of b/l LE swelling admit to medicine for acute on chronic HFpEF.    Acute on chronic diastolic heart failure.   - c/w lasix 40mg IV daily  - metoprolol 50BID, imdur 30  - cardiology follow.     Acute kidney injury superimposed on chronic kidney disease.  - Nephrology evaluation  - decrease Lasix to 40 mg daily.    Longstanding persistent atrial fibrillation.   - c/w metoprolol  - Restart eliquis.    CAD  - medical Rx  - cardiology evaluation  - high risk for cath    Hypothyroidism (acquired).  - c/w synthroid.   - Follow TSH    Essential hypertension.    - c/w metoprolol, isosorbide, lasix as above.     Enlarged prostate.   - c/w finasteride.     Prophylactic measure.  - francisco Sanchez MD pager [phone] 
89M w/ HFpEF, CAD, Afib on eliquis, SSS s/p PPM, CKD4, HTN presents for evaluation of b/l LE swelling admit to medicine for acute on chronic HFpEF.    Acute on chronic diastolic heart failure.   - c/w lasix 80/40  - metoprolol 50BID, imdur 30  - cardiology follow.     Acute kidney injury superimposed on chronic kidney disease.  - Nephrology evaluation  - decrease Lasix to 40 mg daily.    Longstanding persistent atrial fibrillation.   - c/w metoprolol  - Restart eliquis.    CAD  - medical Rx  - cardiology evaluation  - high risk for cath    Hypothyroidism (acquired).  - c/w synthroid.   - Follow TSH    Essential hypertension.    - c/w metoprolol, isosorbide, lasix as above.     Enlarged prostate.   - c/w finasteride.     Prophylactic measure.  - eliquis      DCP to NH in am if stable.    Kofi Sanchez MD pager [phone] 
89 year male h/o HTN hyperlipidemia CAD positive stress test, h/ o progressive renal failure, HFpEF, presents with progressive pedal edema R>L heart failure. h/o PAF that became sustained, associated with worsening CHF, PPM in place with pacemaker dependent.    Diuresed well with lasix.  Case discussed with DR riley and son.  Will continue to manage medically rather than proceeding with cath with advanced age and renal insufficency

## 2021-02-05 NOTE — PROGRESS NOTE ADULT - REASON FOR ADMISSION
89M p/w b/l LE swelling

## 2021-04-21 ENCOUNTER — INPATIENT (INPATIENT)
Facility: HOSPITAL | Age: 86
LOS: 5 days | Discharge: HOME | DRG: 291 | End: 2021-04-27
Attending: INTERNAL MEDICINE | Admitting: INTERNAL MEDICINE
Payer: MEDICARE

## 2021-04-21 VITALS
OXYGEN SATURATION: 95 % | HEIGHT: 66 IN | RESPIRATION RATE: 16 BRPM | TEMPERATURE: 98 F | WEIGHT: 160.06 LBS | SYSTOLIC BLOOD PRESSURE: 126 MMHG | HEART RATE: 60 BPM | DIASTOLIC BLOOD PRESSURE: 70 MMHG

## 2021-04-21 DIAGNOSIS — N17.9 ACUTE KIDNEY FAILURE, UNSPECIFIED: ICD-10-CM

## 2021-04-21 DIAGNOSIS — E03.9 HYPOTHYROIDISM, UNSPECIFIED: ICD-10-CM

## 2021-04-21 DIAGNOSIS — Z90.89 ACQUIRED ABSENCE OF OTHER ORGANS: Chronic | ICD-10-CM

## 2021-04-21 DIAGNOSIS — I50.9 HEART FAILURE, UNSPECIFIED: ICD-10-CM

## 2021-04-21 DIAGNOSIS — I48.19 OTHER PERSISTENT ATRIAL FIBRILLATION: ICD-10-CM

## 2021-04-21 DIAGNOSIS — Z29.9 ENCOUNTER FOR PROPHYLACTIC MEASURES, UNSPECIFIED: ICD-10-CM

## 2021-04-21 DIAGNOSIS — E78.01 FAMILIAL HYPERCHOLESTEROLEMIA: ICD-10-CM

## 2021-04-21 DIAGNOSIS — N40.0 BENIGN PROSTATIC HYPERPLASIA WITHOUT LOWER URINARY TRACT SYMPTOMS: ICD-10-CM

## 2021-04-21 DIAGNOSIS — I25.10 ATHEROSCLEROTIC HEART DISEASE OF NATIVE CORONARY ARTERY WITHOUT ANGINA PECTORIS: ICD-10-CM

## 2021-04-21 DIAGNOSIS — Z95.0 PRESENCE OF CARDIAC PACEMAKER: ICD-10-CM

## 2021-04-21 DIAGNOSIS — I25.118 ATHEROSCLEROTIC HEART DISEASE OF NATIVE CORONARY ARTERY WITH OTHER FORMS OF ANGINA PECTORIS: ICD-10-CM

## 2021-04-21 DIAGNOSIS — I10 ESSENTIAL (PRIMARY) HYPERTENSION: ICD-10-CM

## 2021-04-21 LAB
ALBUMIN SERPL ELPH-MCNC: 3.4 G/DL — SIGNIFICANT CHANGE UP (ref 3.3–5)
ALP SERPL-CCNC: 110 U/L — SIGNIFICANT CHANGE UP (ref 40–120)
ALT FLD-CCNC: 12 U/L — SIGNIFICANT CHANGE UP (ref 10–45)
ANION GAP SERPL CALC-SCNC: 15 MMOL/L — SIGNIFICANT CHANGE UP (ref 5–17)
APTT BLD: 33.4 SEC — SIGNIFICANT CHANGE UP (ref 27.5–35.5)
AST SERPL-CCNC: 27 U/L — SIGNIFICANT CHANGE UP (ref 10–40)
BASOPHILS # BLD AUTO: 0.07 K/UL — SIGNIFICANT CHANGE UP (ref 0–0.2)
BASOPHILS NFR BLD AUTO: 1.1 % — SIGNIFICANT CHANGE UP (ref 0–2)
BILIRUB SERPL-MCNC: 0.5 MG/DL — SIGNIFICANT CHANGE UP (ref 0.2–1.2)
BUN SERPL-MCNC: 52 MG/DL — HIGH (ref 7–23)
CALCIUM SERPL-MCNC: 8.6 MG/DL — SIGNIFICANT CHANGE UP (ref 8.4–10.5)
CHLORIDE SERPL-SCNC: 95 MMOL/L — LOW (ref 96–108)
CO2 SERPL-SCNC: 24 MMOL/L — SIGNIFICANT CHANGE UP (ref 22–31)
CREAT SERPL-MCNC: 2.22 MG/DL — HIGH (ref 0.5–1.3)
EOSINOPHIL # BLD AUTO: 0.46 K/UL — SIGNIFICANT CHANGE UP (ref 0–0.5)
EOSINOPHIL NFR BLD AUTO: 7.5 % — HIGH (ref 0–6)
GLUCOSE SERPL-MCNC: 81 MG/DL — SIGNIFICANT CHANGE UP (ref 70–99)
HCT VFR BLD CALC: 34.1 % — LOW (ref 39–50)
HGB BLD-MCNC: 10.1 G/DL — LOW (ref 13–17)
IMM GRANULOCYTES NFR BLD AUTO: 0.3 % — SIGNIFICANT CHANGE UP (ref 0–1.5)
INR BLD: 1.24 RATIO — HIGH (ref 0.88–1.16)
LYMPHOCYTES # BLD AUTO: 0.8 K/UL — LOW (ref 1–3.3)
LYMPHOCYTES # BLD AUTO: 13.1 % — SIGNIFICANT CHANGE UP (ref 13–44)
MCHC RBC-ENTMCNC: 23.8 PG — LOW (ref 27–34)
MCHC RBC-ENTMCNC: 29.6 GM/DL — LOW (ref 32–36)
MCV RBC AUTO: 80.2 FL — SIGNIFICANT CHANGE UP (ref 80–100)
MONOCYTES # BLD AUTO: 0.69 K/UL — SIGNIFICANT CHANGE UP (ref 0–0.9)
MONOCYTES NFR BLD AUTO: 11.3 % — SIGNIFICANT CHANGE UP (ref 2–14)
NEUTROPHILS # BLD AUTO: 4.07 K/UL — SIGNIFICANT CHANGE UP (ref 1.8–7.4)
NEUTROPHILS NFR BLD AUTO: 66.7 % — SIGNIFICANT CHANGE UP (ref 43–77)
NRBC # BLD: 0 /100 WBCS — SIGNIFICANT CHANGE UP (ref 0–0)
NT-PROBNP SERPL-SCNC: 5624 PG/ML — HIGH (ref 0–300)
PLATELET # BLD AUTO: 335 K/UL — SIGNIFICANT CHANGE UP (ref 150–400)
POTASSIUM SERPL-MCNC: 4.4 MMOL/L — SIGNIFICANT CHANGE UP (ref 3.5–5.3)
POTASSIUM SERPL-SCNC: 4.4 MMOL/L — SIGNIFICANT CHANGE UP (ref 3.5–5.3)
PROT SERPL-MCNC: 6.8 G/DL — SIGNIFICANT CHANGE UP (ref 6–8.3)
PROTHROM AB SERPL-ACNC: 14.7 SEC — HIGH (ref 10.6–13.6)
RBC # BLD: 4.25 M/UL — SIGNIFICANT CHANGE UP (ref 4.2–5.8)
RBC # FLD: 19.9 % — HIGH (ref 10.3–14.5)
SARS-COV-2 RNA SPEC QL NAA+PROBE: SIGNIFICANT CHANGE UP
SODIUM SERPL-SCNC: 134 MMOL/L — LOW (ref 135–145)
TROPONIN T, HIGH SENSITIVITY RESULT: 104 NG/L — HIGH (ref 0–51)
WBC # BLD: 6.11 K/UL — SIGNIFICANT CHANGE UP (ref 3.8–10.5)
WBC # FLD AUTO: 6.11 K/UL — SIGNIFICANT CHANGE UP (ref 3.8–10.5)

## 2021-04-21 PROCEDURE — 99285 EMERGENCY DEPT VISIT HI MDM: CPT

## 2021-04-21 PROCEDURE — 71045 X-RAY EXAM CHEST 1 VIEW: CPT | Mod: 26

## 2021-04-21 PROCEDURE — 99223 1ST HOSP IP/OBS HIGH 75: CPT

## 2021-04-21 PROCEDURE — 93010 ELECTROCARDIOGRAM REPORT: CPT

## 2021-04-21 RX ORDER — APIXABAN 2.5 MG/1
2.5 TABLET, FILM COATED ORAL EVERY 12 HOURS
Refills: 0 | Status: DISCONTINUED | OUTPATIENT
Start: 2021-04-21 | End: 2021-04-27

## 2021-04-21 RX ORDER — FINASTERIDE 5 MG/1
5 TABLET, FILM COATED ORAL DAILY
Refills: 0 | Status: DISCONTINUED | OUTPATIENT
Start: 2021-04-21 | End: 2021-04-27

## 2021-04-21 RX ORDER — ATORVASTATIN CALCIUM 80 MG/1
20 TABLET, FILM COATED ORAL AT BEDTIME
Refills: 0 | Status: DISCONTINUED | OUTPATIENT
Start: 2021-04-21 | End: 2021-04-27

## 2021-04-21 RX ORDER — FUROSEMIDE 40 MG
40 TABLET ORAL ONCE
Refills: 0 | Status: COMPLETED | OUTPATIENT
Start: 2021-04-21 | End: 2021-04-21

## 2021-04-21 RX ORDER — ISOSORBIDE MONONITRATE 60 MG/1
30 TABLET, EXTENDED RELEASE ORAL DAILY
Refills: 0 | Status: DISCONTINUED | OUTPATIENT
Start: 2021-04-22 | End: 2021-04-27

## 2021-04-21 RX ORDER — ASPIRIN/CALCIUM CARB/MAGNESIUM 324 MG
81 TABLET ORAL DAILY
Refills: 0 | Status: DISCONTINUED | OUTPATIENT
Start: 2021-04-22 | End: 2021-04-27

## 2021-04-21 RX ORDER — METOPROLOL TARTRATE 50 MG
50 TABLET ORAL
Refills: 0 | Status: DISCONTINUED | OUTPATIENT
Start: 2021-04-21 | End: 2021-04-27

## 2021-04-21 RX ORDER — ISOSORBIDE DINITRATE 5 MG/1
1 TABLET ORAL
Qty: 0 | Refills: 0 | DISCHARGE

## 2021-04-21 RX ORDER — LEVOTHYROXINE SODIUM 125 MCG
1 TABLET ORAL
Qty: 30 | Refills: 0

## 2021-04-21 RX ORDER — LEVOTHYROXINE SODIUM 125 MCG
50 TABLET ORAL DAILY
Refills: 0 | Status: DISCONTINUED | OUTPATIENT
Start: 2021-04-22 | End: 2021-04-27

## 2021-04-21 RX ADMIN — ATORVASTATIN CALCIUM 20 MILLIGRAM(S): 80 TABLET, FILM COATED ORAL at 23:04

## 2021-04-21 RX ADMIN — Medication 40 MILLIGRAM(S): at 19:37

## 2021-04-21 RX ADMIN — APIXABAN 2.5 MILLIGRAM(S): 2.5 TABLET, FILM COATED ORAL at 23:06

## 2021-04-21 NOTE — CONSULT NOTE ADULT - ASSESSMENT
89 year male with h/o HTN hyperlipidemia CRI CAD + stress test , SSS post PPM persistent atrial fibrillation, presents with acute dyspnea on exertion and pedal edema, acute on chronic diastolic heart failure, acute on chronic renal failure

## 2021-04-21 NOTE — H&P ADULT - NSHPREVIEWOFSYSTEMS_GEN_ALL_CORE
REVIEW OF SYSTEMS:  CONSTITUTIONAL: No weakness. No fevers. No chills.  No weight loss.    EYES: No blurry or double vision. No eye pain.  ENT: No hearing difficulty. No vertigo. No dysphagia. No sore throat. No Sinusitis/rhinorrhea.   NECK: No pain. No stiffness/rigidity.  CARDIAC: No chest pain. No palpitations. No lightheadedness. No syncope.  RESPIRATORY: No cough. Dyspnea on exertion. No hemoptysis.  GASTROINTESTINAL: No abdominal pain. No nausea. No vomiting. No hematemesis. No diarrhea. No constipation. No melena. No hematochezia.  GENITOURINARY: No dysuria. No frequency. No hesitancy. No hematuria. No oliguria.  NEUROLOGICAL: No numbness/tingling. No focal weakness. No urinary or fecal incontinence. No headache. No unsteady gait.  BACK: No back pain. No flank pain.  EXTREMITIES: No lower extremity edema. Full ROM. No joint pain.  SKIN: No rashes. No itching. No other lesions.  PSYCHIATRIC: No depression. No anxiety. No SI/HI.  ALLERGIC: No lip swelling. No hives.  All other review of systems is negative unless indicated above.  Unless indicated above, unable to assess ROS 2/2

## 2021-04-21 NOTE — H&P ADULT - PROBLEM SELECTOR PLAN 1
- c/w lasix 40mg IV daily  - metoprolol 50BID, imdur 30 mg qD  - will need cardiology consult in am  - Dash diet, strict In/Out

## 2021-04-21 NOTE — H&P ADULT - NSHPPHYSICALEXAM_GEN_ALL_CORE
PHYSICAL EXAM:   GENERAL: Alert. Not confused. No acute distress.    HEAD:  Atraumatic. Normocephalic.  EYES: EOMI.   Normal conjunctiva/sclera.  ENT: Neck supple. + JVD. Moist oral mucosa.   CARDIAC: No tachy, Not dayna. Irregularly irregular. S1. S2. No murmur. No rub. No distant heart sounds.  LUNG/CHEST: CTAB. BS equal bilaterally. No wheezes. No rales. No rhonchi.  ABDOMEN: Soft. No tenderness. No distension.  Normal bowel sounds.  VASCULAR: +2 b/l radial  pulses. Palpable DP pulses.  EXTREMITIES:  2+ b/l pitting edema. Moving all 4.  NEUROLOGY: A&Ox3. Non-focal exam. Cranial nerves intact. Normal speech. Sensation intact.  PSYCH: Normal behavior. Normal affect.  SKIN: No jaundice. No erythema. No rash/lesion.  Vascular Access:     ICU Vital Signs Last 24 Hrs  T(C): 36.6 (21 Apr 2021 16:48), Max: 36.6 (21 Apr 2021 16:48)  T(F): 97.9 (21 Apr 2021 16:48), Max: 97.9 (21 Apr 2021 16:48)  HR: 61 (21 Apr 2021 19:38) (60 - 61)  BP: 126/61 (21 Apr 2021 19:38) (126/61 - 126/70)  BP(mean): --  ABP: --  ABP(mean): --  RR: 19 (21 Apr 2021 19:38) (16 - 19)  SpO2: 94% (21 Apr 2021 19:38) (94% - 95%)      I&O's Summary

## 2021-04-21 NOTE — ED ADULT NURSE NOTE - OBJECTIVE STATEMENT
89yo male presents to er alert and oriented x 4with complaints of shortness of breath while walking in facility today, denies current shortness of breath, chest pain, dizziness, blurry vision, cough, fevers, chills and all other complaints, states sob is intermittent, placed on cardiac monitor, labs collected, vss stable. Respirations even and nonlabored, breathing spontaneously on room air. Nic RN

## 2021-04-21 NOTE — ED PROVIDER NOTE - CLINICAL SUMMARY MEDICAL DECISION MAKING FREE TEXT BOX
89 y M h/o SSS s/p aicd/ppm, hfpef, afib on AC bibems for ?SOB?.  Patient himself has no complaints, states has baseline le edema and escobar that is not worsened.  No n/v, no f/c, no cough, no congestion.  Exam c/w significant +2-3 pitting edema to mid thighs, +JVD, +bibasilar crackles and diminished breath sounds in R lung base.  Will s/w cardiologist Dr. Barrientos, check CXR, labs, ekg, trop.  Likely will give diuretic but will check labs/confirm home dose as pt does not require emergent diuresis at the moment.  Likely will need admission though overall well appearance could allow d/c c close f/u.  --BMM

## 2021-04-21 NOTE — H&P ADULT - HISTORY OF PRESENT ILLNESS
89 yr old male with PMH of CHF, and pacemaker,presents from [facility] [employer] assisted w/ worsening b/l LE edema and dyspnea on exertion. Patient endorses worsening dyspnea on exertion over the last few days. Improves when he rests. No orthopnea or PND. Also denies chest pain. Dyspnea was of gradual onset.  Denies cough, fever chills,  nausea, vomiting, diaphoresis, abdominal pain, or leg pain.   Patient states he had the COVID vaccine a few months ago.   89M w/ HFpEF, CAD, Afib on eliquis, SSS s/p PPM, CKD4, HTN presents for evaluation of b/l LE swelling and exertional dyspnea.  Per chart review he had recent hospitalization in Feb 2021 for acute HF exacerbation.   Patient endorses worsening dyspnea on exertion over the last few days. Improves when he rests. No orthopnea or PND. Also denies chest pain. Dyspnea was of gradual onset.  Denies cough, fever chills,  nausea, vomiting, diaphoresis, abdominal pain, or leg pain.   Patient states he had the COVID vaccine a few months ago.

## 2021-04-21 NOTE — H&P ADULT - NSHPLABSRESULTS_GEN_ALL_CORE
Personally reviewed labs.   Personally reviewed imaging.   Personally reviewed EKG.                           10.1   6.11  )-----------( 335      ( 21 Apr 2021 18:04 )             34.1     134<L>  |  95<L>  |  52<H>  ----------------------------<  81  4.4   |  24  |  2.22<H>    Ca    8.6      21 Apr 2021 18:04    TPro  6.8  /  Alb  3.4  /  TBili  0.5  /  DBili  x   /  AST  27  /  ALT  12  /  AlkPhos  110  04-21    LIVER FUNCTIONS - ( 21 Apr 2021 18:04 )  Alb: 3.4 g/dL / Pro: 6.8 g/dL / ALK PHOS: 110 U/L / ALT: 12 U/L / AST: 27 U/L / GGT: x             PT/INR - ( 21 Apr 2021 18:04 )   PT: 14.7 sec;   INR: 1.24 ratio    PTT - ( 21 Apr 2021 18:04 )  PTT:33.4 sec  EKG w/ ventricularly paced rhythm, no AMEE, qtc 512  CXR:

## 2021-04-21 NOTE — CONSULT NOTE ADULT - SUBJECTIVE AND OBJECTIVE BOX
CHIEF COMPLAINT: dyspnea, pedal edema      PAST MEDICAL & SURGICAL HISTORY:  Hypertension    Hyperlipidemia    BPH (benign prostatic hyperplasia)    Hypothyroidism (acquired)    H/O cardiac arrhythmia    Pacemaker    Pacemaker    S/P tonsillectomy        HPI: 89 year male with history of HTN HLD known CAD with + stress test managed medically due to advanced age and advanced renal disease, SSS persistent AFIB post PPM,  prior admissions with acute on chronic diastolic  heart failure , 11/20 echo normal EF. prior falls in past secondary to orthostatic hypotension and overdiuresis, now admitted with dyspnea, pedal edema, BNP >5000.  no chest pain . no palpitations, synccope or presyncope.                PREVIOUS DIAGNOSTIC TESTING:      ECHO  FINDINGS:    STRESS  FINDINGS:    CATHETERIZATION  FINDINGS:    ELECTROPHYSIOLOGY STUDY  FINDINGS:    CAROTID ULTRASOUND:  FINDINGS    VENOUS DUPLEX SCAN:  FINDINGS:    CHEST CT PULMONARY ANGIO with IV Contrast:  FINDINGS:  MEDICATIONS  (STANDING):  apixaban 2.5 milliGRAM(s) Oral every 12 hours  atorvastatin 20 milliGRAM(s) Oral at bedtime  finasteride 5 milliGRAM(s) Oral daily  metoprolol tartrate 50 milliGRAM(s) Oral two times a day    MEDICATIONS  (PRN):      FAMILY HISTORY:  FH: heart disease (Father)  father        SOCIAL HISTORY:    CIGARETTES:    ALCOHOL:    REVIEW OF SYSTEMS:    CONSTITUTIONAL: No fever, weight loss, chills, shakes, or fatigue  EYES: No eye pain, visual disturbances, or discharge  ENMT:  No difficulty hearing, tinnitus, vertigo; No sinus or throat pain  NECK: No pain or stiffness  BREASTS: No pain, masses, or nipple discharge  RESPIRATORY: No cough, wheezing, hemoptysis, or shortness of breath  CARDIOVASCULAR: No chest pain, dyspnea, palpitations, dizziness, syncope, paroxysmal nocturnal dyspnea, orthopnea, or arm or leg swelling  GASTROINTESTINAL: No abdominal  or epigastric pain, nausea, vomiting, hematemesis, diarrhea, constipation, melena or bright red blood.  GENITOURINARY: No dysuria, nocturia, hematuria, or urinary incontinence  NEUROLOGICAL: No headaches, memory loss, slurred speech, limb weakness, loss of strength, numbness, or tremors  SKIN: No itching, burning, rashes, or lesions   LYMPH NODES: No enlarged glands  ENDOCRINE: No heat or cold intolerance, or hair loss  MUSCULOSKELETAL: No joint pain or swelling, muscle, back, or extremity pain  PSYCHIATRIC: No depression, anxiety, or difficulty sleeping  HEME/LYMPH: No easy bruising or bleeding gums  ALLERY AND IMMUNOLOGIC: No hives or rash.      Vital Signs Last 24 Hrs  T(C): 36.6 (21 Apr 2021 16:48), Max: 36.6 (21 Apr 2021 16:48)  T(F): 97.9 (21 Apr 2021 16:48), Max: 97.9 (21 Apr 2021 16:48)  HR: 61 (21 Apr 2021 19:38) (60 - 61)  BP: 126/61 (21 Apr 2021 19:38) (126/61 - 126/70)  BP(mean): --  RR: 19 (21 Apr 2021 19:38) (16 - 19)  SpO2: 94% (21 Apr 2021 19:38) (94% - 95%)  Daily Height in cm: 167.64 (21 Apr 2021 16:48)    Daily         PHYSICAL EXAM:    GENERAL: In no apparent distress, well nourished, and hydrated.  HEAD:  Atraumatic, Normocephalic  EYES: EOMI, PERRLA, conjunctiva and sclera clear  ENMT: No tonsillar erythema, exudates, or enlargement; Moist mucous membranes, Good dentition, No lesions  NECK: Supple and normal thyroid.  No JVD or carotid bruit.  Carotid pulse is 2+ bilaterally.  HEART: Regular rate and rhythm; No murmurs, rubs, or gallops.  PULMONARY: Clear to auscultation and perfusion.  No rales, wheezing, or rhonchi bilaterally.  ABDOMEN: Soft, Nontender, Nondistended; Bowel sounds present  EXTREMITIES:  2+ Peripheral Pulses, No clubbing, cyanosis, or edema  LYMPH: No lymphadenopathy noted  NEUROLOGICAL: Grossly nonfocal          INTERPRETATION OF TELEMETRY:    ECG:    I&O's Detail      LABS:                        10.1   6.11  )-----------( 335      ( 21 Apr 2021 18:04 )             34.1     04-21    134<L>  |  95<L>  |  52<H>  ----------------------------<  81  4.4   |  24  |  2.22<H>    Ca    8.6      21 Apr 2021 18:04    TPro  6.8  /  Alb  3.4  /  TBili  0.5  /  DBili  x   /  AST  27  /  ALT  12  /  AlkPhos  110  04-21        PT/INR - ( 21 Apr 2021 18:04 )   PT: 14.7 sec;   INR: 1.24 ratio         PTT - ( 21 Apr 2021 18:04 )  PTT:33.4 sec    BNPSerum Pro-Brain Natriuretic Peptide: 5624 pg/mL (04-21 @ 18:04)    I&O's Detail    Daily Height in cm: 167.64 (21 Apr 2021 16:48)    Daily     RADIOLOGY & ADDITIONAL STUDIES:

## 2021-04-21 NOTE — PATIENT PROFILE ADULT - DATE OF LAST VACCINATION
Patient needs to call office to verify if he ever received teaching for medication before refill can be authorized
21-Mar-2021

## 2021-04-21 NOTE — H&P ADULT - PROBLEM SELECTOR PLAN 2
Likely secondary to acute CHF exacerbation but will r/o obstructive pathology  obtain bladder scan and if needed straight cath  Monitor strict In/Outs

## 2021-04-21 NOTE — ED PROVIDER NOTE - CARDIAC, MLM
Normal rate, regular rhythm.  Heart sounds S1, S2.  No murmurs, rubs or gallops. 2+ bilateral lower extremity pitting edema. +JVD

## 2021-04-21 NOTE — ED PROVIDER NOTE - CPE EDP SKIN NORM
Dermatochalasis/ Ptosis Counseling:  I have explained the diagnosis of dermatochalasis/ptosis to the patient. The resulting symptoms, including but not limited to visual field deficits, blepharitis, and/or dry eye symptoms from eyelid wick syndrome, that may result if left untreated were discussed with the patient. The patient understands that intervention is elective surgery and consultation with an oculoplastic specialist can be scheduled at their convenience. normal...

## 2021-04-22 LAB
ANION GAP SERPL CALC-SCNC: 16 MMOL/L — SIGNIFICANT CHANGE UP (ref 5–17)
BASOPHILS # BLD AUTO: 0.09 K/UL — SIGNIFICANT CHANGE UP (ref 0–0.2)
BASOPHILS NFR BLD AUTO: 1.4 % — SIGNIFICANT CHANGE UP (ref 0–2)
BUN SERPL-MCNC: 47 MG/DL — HIGH (ref 7–23)
CALCIUM SERPL-MCNC: 8.7 MG/DL — SIGNIFICANT CHANGE UP (ref 8.4–10.5)
CHLORIDE SERPL-SCNC: 97 MMOL/L — SIGNIFICANT CHANGE UP (ref 96–108)
CO2 SERPL-SCNC: 22 MMOL/L — SIGNIFICANT CHANGE UP (ref 22–31)
COVID-19 SPIKE DOMAIN AB INTERP: NEGATIVE — SIGNIFICANT CHANGE UP
COVID-19 SPIKE DOMAIN ANTIBODY RESULT: 0.4 U/ML — SIGNIFICANT CHANGE UP
CREAT SERPL-MCNC: 2 MG/DL — HIGH (ref 0.5–1.3)
EOSINOPHIL # BLD AUTO: 0.47 K/UL — SIGNIFICANT CHANGE UP (ref 0–0.5)
EOSINOPHIL NFR BLD AUTO: 7.5 % — HIGH (ref 0–6)
GLUCOSE SERPL-MCNC: 70 MG/DL — SIGNIFICANT CHANGE UP (ref 70–99)
HCT VFR BLD CALC: 36.1 % — LOW (ref 39–50)
HGB BLD-MCNC: 10.6 G/DL — LOW (ref 13–17)
IMM GRANULOCYTES NFR BLD AUTO: 0.3 % — SIGNIFICANT CHANGE UP (ref 0–1.5)
LYMPHOCYTES # BLD AUTO: 0.83 K/UL — LOW (ref 1–3.3)
LYMPHOCYTES # BLD AUTO: 13.2 % — SIGNIFICANT CHANGE UP (ref 13–44)
MAGNESIUM SERPL-MCNC: 2.4 MG/DL — SIGNIFICANT CHANGE UP (ref 1.6–2.6)
MCHC RBC-ENTMCNC: 23.7 PG — LOW (ref 27–34)
MCHC RBC-ENTMCNC: 29.4 GM/DL — LOW (ref 32–36)
MCV RBC AUTO: 80.8 FL — SIGNIFICANT CHANGE UP (ref 80–100)
MONOCYTES # BLD AUTO: 0.67 K/UL — SIGNIFICANT CHANGE UP (ref 0–0.9)
MONOCYTES NFR BLD AUTO: 10.6 % — SIGNIFICANT CHANGE UP (ref 2–14)
NEUTROPHILS # BLD AUTO: 4.22 K/UL — SIGNIFICANT CHANGE UP (ref 1.8–7.4)
NEUTROPHILS NFR BLD AUTO: 67 % — SIGNIFICANT CHANGE UP (ref 43–77)
NRBC # BLD: 0 /100 WBCS — SIGNIFICANT CHANGE UP (ref 0–0)
PLATELET # BLD AUTO: 278 K/UL — SIGNIFICANT CHANGE UP (ref 150–400)
POTASSIUM SERPL-MCNC: 4.5 MMOL/L — SIGNIFICANT CHANGE UP (ref 3.5–5.3)
POTASSIUM SERPL-SCNC: 4.5 MMOL/L — SIGNIFICANT CHANGE UP (ref 3.5–5.3)
RBC # BLD: 4.47 M/UL — SIGNIFICANT CHANGE UP (ref 4.2–5.8)
RBC # FLD: 20 % — HIGH (ref 10.3–14.5)
SARS-COV-2 IGG+IGM SERPL QL IA: 0.4 U/ML — SIGNIFICANT CHANGE UP
SARS-COV-2 IGG+IGM SERPL QL IA: NEGATIVE — SIGNIFICANT CHANGE UP
SODIUM SERPL-SCNC: 135 MMOL/L — SIGNIFICANT CHANGE UP (ref 135–145)
WBC # BLD: 6.3 K/UL — SIGNIFICANT CHANGE UP (ref 3.8–10.5)
WBC # FLD AUTO: 6.3 K/UL — SIGNIFICANT CHANGE UP (ref 3.8–10.5)

## 2021-04-22 RX ORDER — FUROSEMIDE 40 MG
40 TABLET ORAL DAILY
Refills: 0 | Status: DISCONTINUED | OUTPATIENT
Start: 2021-04-22 | End: 2021-04-23

## 2021-04-22 RX ADMIN — FINASTERIDE 5 MILLIGRAM(S): 5 TABLET, FILM COATED ORAL at 12:15

## 2021-04-22 RX ADMIN — APIXABAN 2.5 MILLIGRAM(S): 2.5 TABLET, FILM COATED ORAL at 18:11

## 2021-04-22 RX ADMIN — ISOSORBIDE MONONITRATE 30 MILLIGRAM(S): 60 TABLET, EXTENDED RELEASE ORAL at 12:15

## 2021-04-22 RX ADMIN — Medication 50 MILLIGRAM(S): at 18:10

## 2021-04-22 RX ADMIN — Medication 40 MILLIGRAM(S): at 05:31

## 2021-04-22 RX ADMIN — ATORVASTATIN CALCIUM 20 MILLIGRAM(S): 80 TABLET, FILM COATED ORAL at 21:29

## 2021-04-22 RX ADMIN — APIXABAN 2.5 MILLIGRAM(S): 2.5 TABLET, FILM COATED ORAL at 05:31

## 2021-04-22 RX ADMIN — Medication 50 MICROGRAM(S): at 05:31

## 2021-04-22 RX ADMIN — Medication 50 MILLIGRAM(S): at 05:31

## 2021-04-22 RX ADMIN — Medication 81 MILLIGRAM(S): at 12:15

## 2021-04-22 NOTE — PHYSICAL THERAPY INITIAL EVALUATION ADULT - PERTINENT HX OF CURRENT PROBLEM, REHAB EVAL
89 y/o M w/ HFpEF, CAD, Afib on eliquis, SSS s/p PPM, CKD4, HTN presents for evaluation of b/l LE swelling and exertional dyspnea in setting of CHF exacerbation.

## 2021-04-22 NOTE — PHYSICAL THERAPY INITIAL EVALUATION ADULT - ADDITIONAL COMMENTS
Pt presenting from an [facility] ambulates w/ a RW, required assistance w/ ADL which is provided by the [facility] staff.

## 2021-04-23 RX ORDER — FUROSEMIDE 40 MG
40 TABLET ORAL EVERY 12 HOURS
Refills: 0 | Status: DISCONTINUED | OUTPATIENT
Start: 2021-04-24 | End: 2021-04-27

## 2021-04-23 RX ORDER — FUROSEMIDE 40 MG
40 TABLET ORAL
Refills: 0 | Status: DISCONTINUED | OUTPATIENT
Start: 2021-04-23 | End: 2021-04-23

## 2021-04-23 RX ORDER — FUROSEMIDE 40 MG
60 TABLET ORAL DAILY
Refills: 0 | Status: DISCONTINUED | OUTPATIENT
Start: 2021-04-23 | End: 2021-04-23

## 2021-04-23 RX ADMIN — APIXABAN 2.5 MILLIGRAM(S): 2.5 TABLET, FILM COATED ORAL at 17:35

## 2021-04-23 RX ADMIN — Medication 40 MILLIGRAM(S): at 05:36

## 2021-04-23 RX ADMIN — ISOSORBIDE MONONITRATE 30 MILLIGRAM(S): 60 TABLET, EXTENDED RELEASE ORAL at 11:58

## 2021-04-23 RX ADMIN — Medication 81 MILLIGRAM(S): at 11:58

## 2021-04-23 RX ADMIN — Medication 50 MICROGRAM(S): at 05:37

## 2021-04-23 RX ADMIN — FINASTERIDE 5 MILLIGRAM(S): 5 TABLET, FILM COATED ORAL at 11:58

## 2021-04-23 RX ADMIN — Medication 50 MILLIGRAM(S): at 17:35

## 2021-04-23 RX ADMIN — APIXABAN 2.5 MILLIGRAM(S): 2.5 TABLET, FILM COATED ORAL at 05:37

## 2021-04-23 RX ADMIN — ATORVASTATIN CALCIUM 20 MILLIGRAM(S): 80 TABLET, FILM COATED ORAL at 21:12

## 2021-04-23 RX ADMIN — Medication 50 MILLIGRAM(S): at 05:37

## 2021-04-24 ENCOUNTER — TRANSCRIPTION ENCOUNTER (OUTPATIENT)
Age: 86
End: 2021-04-24

## 2021-04-24 LAB
ANION GAP SERPL CALC-SCNC: 18 MMOL/L — HIGH (ref 5–17)
BUN SERPL-MCNC: 43 MG/DL — HIGH (ref 7–23)
CALCIUM SERPL-MCNC: 8.5 MG/DL — SIGNIFICANT CHANGE UP (ref 8.4–10.5)
CHLORIDE SERPL-SCNC: 95 MMOL/L — LOW (ref 96–108)
CO2 SERPL-SCNC: 24 MMOL/L — SIGNIFICANT CHANGE UP (ref 22–31)
CREAT SERPL-MCNC: 2.15 MG/DL — HIGH (ref 0.5–1.3)
GLUCOSE SERPL-MCNC: 80 MG/DL — SIGNIFICANT CHANGE UP (ref 70–99)
MAGNESIUM SERPL-MCNC: 2.4 MG/DL — SIGNIFICANT CHANGE UP (ref 1.6–2.6)
POTASSIUM SERPL-MCNC: 4 MMOL/L — SIGNIFICANT CHANGE UP (ref 3.5–5.3)
POTASSIUM SERPL-SCNC: 4 MMOL/L — SIGNIFICANT CHANGE UP (ref 3.5–5.3)
SODIUM SERPL-SCNC: 137 MMOL/L — SIGNIFICANT CHANGE UP (ref 135–145)

## 2021-04-24 RX ADMIN — ATORVASTATIN CALCIUM 20 MILLIGRAM(S): 80 TABLET, FILM COATED ORAL at 21:19

## 2021-04-24 RX ADMIN — Medication 40 MILLIGRAM(S): at 05:35

## 2021-04-24 RX ADMIN — FINASTERIDE 5 MILLIGRAM(S): 5 TABLET, FILM COATED ORAL at 11:26

## 2021-04-24 RX ADMIN — Medication 50 MILLIGRAM(S): at 17:28

## 2021-04-24 RX ADMIN — APIXABAN 2.5 MILLIGRAM(S): 2.5 TABLET, FILM COATED ORAL at 17:28

## 2021-04-24 RX ADMIN — ISOSORBIDE MONONITRATE 30 MILLIGRAM(S): 60 TABLET, EXTENDED RELEASE ORAL at 11:25

## 2021-04-24 RX ADMIN — Medication 81 MILLIGRAM(S): at 11:25

## 2021-04-24 RX ADMIN — APIXABAN 2.5 MILLIGRAM(S): 2.5 TABLET, FILM COATED ORAL at 05:35

## 2021-04-24 RX ADMIN — Medication 50 MILLIGRAM(S): at 05:35

## 2021-04-24 RX ADMIN — Medication 40 MILLIGRAM(S): at 17:28

## 2021-04-24 RX ADMIN — Medication 50 MICROGRAM(S): at 05:35

## 2021-04-24 NOTE — DISCHARGE NOTE PROVIDER - HOSPITAL COURSE
89M w/ HFpEF, CAD, Afib on eliquis, SSS s/p PPM, CKD4, HTN presents for evaluation of b/l LE swelling and exertional dyspnea.  Per chart review he had recent hospitalization in Feb 2021 for acute HF exacerbation.   Patient endorses worsening dyspnea on exertion over the last few days. Improves when he rests. No orthopnea or PND. Also denies chest pain. Dyspnea was of gradual onset.  Denies cough, fever chills,  nausea, vomiting, diaphoresis, abdominal pain, or leg pain.   Patient states he had the COVID vaccine a few months ago.      Acute on chronic congestive heart failure, unspecified heart failure type.   - DC: Lasix 40 mg PO BID  - metoprolol 50 BID, imdur 30 mg qD    Acute kidney injury superimposed on chronic kidney disease.   - Likely secondary to acute CHF exacerbation but will r/o obstructive pathology    Persistent atrial fibrillation.   - c/w metoprolol bid   - c/w eliquis 2.5 mg bid; monitor renal clearance.     CAD (coronary artery disease).  - c/w lipitor 20 mg and Asa 81 mg.     Hypothyroidism (acquired).   - c/w synthroid.     Essential hypertension.  - c/w imdur and metoprolol.    BPH (benign prostatic hyperplasia).   - c/w finasteride.     PPM check  Plan: ST Mo device- please call PPM NP to check.     Prophylactic measure.   - DVT ppx: eliquis as above.    89M w/ HFpEF, CAD, Afib on eliquis, SSS s/p PPM, CKD4, HTN presents for evaluation of b/l LE swelling and exertional dyspnea.  Per chart review he had recent hospitalization in Feb 2021 for acute HF exacerbation.   Patient endorses worsening dyspnea on exertion over the last few days. Improves when he rests. No orthopnea or PND. Also denies chest pain. Dyspnea was of gradual onset.  Denies cough, fever chills,  nausea, vomiting, diaphoresis, abdominal pain, or leg pain.   Patient states he had the COVID vaccine a few months ago.      Acute on chronic congestive heart failure, unspecified heart failure type.   - DC: Lasix 40 mg PO BID  - metoprolol 50 BID, imdur 30 mg qD    Acute kidney injury superimposed on chronic kidney disease.   - Likely secondary to acute CHF exacerbation but will r/o obstructive pathology    Persistent atrial fibrillation.   - c/w metoprolol bid   - c/w eliquis 2.5 mg bid; monitor renal clearance.     CAD (coronary artery disease).  - c/w lipitor 20 mg and Asa 81 mg.     Hypothyroidism (acquired).   - c/w synthroid.     Essential hypertension.  - c/w imdur and metoprolol.    BPH (benign prostatic hyperplasia).   - c/w finasteride.     PPM check  Plan: ST Mo device- please call PPM NP to check.     Prophylactic measure.   - DVT ppx: eliquis as above.       Pt is clear for discharge to assistant living by Dr. Sanchez.

## 2021-04-24 NOTE — DISCHARGE NOTE PROVIDER - CARE PROVIDER_API CALL
Kofi Sanchez  INTERNAL MEDICINE  [address] [place] [address]  Phone: [phone]  Fax: [phone]  Follow Up Time:     Danny Padgett  CARDIOLOGY  [address] [facility] [facility] [address]  [place] [address]  Phone: [phone]  Fax: [phone]  Follow Up Time:

## 2021-04-24 NOTE — DISCHARGE NOTE PROVIDER - NSDCMRMEDTOKEN_GEN_ALL_CORE_FT
acetaminophen 325 mg oral tablet: 2 tab(s) orally every 8 hours, As Needed  aspirin 81 mg oral delayed release tablet: 1 tab(s) orally once a day  atorvastatin 20 mg oral tablet: 1 tab(s) orally once a day (at bedtime)  Eliquis 2.5 mg oral tablet: 1 tab(s) orally 2 times a day  finasteride 5 mg oral tablet: 1 tab(s) orally once a day  furosemide 40 mg oral tablet: 1 tab(s) orally 2 times a day  isosorbide mononitrate 30 mg oral tablet, extended release: 1 tab(s) orally once a day  levothyroxine 50 mcg (0.05 mg) oral tablet: 1 tab(s) orally once a day  metoprolol tartrate 50 mg oral tablet: 1 tab(s) orally 2 times a day  potassium chloride 20 mEq/15 mL oral liquid: 15 milliliter(s) orally once a day   acetaminophen 325 mg oral tablet: 2 tab(s) orally every 6 hours, As needed, Temp greater or equal to 38C (100.4F), Mild Pain (1 - 3)  apixaban 2.5 mg oral tablet: 1 tab(s) orally every 12 hours  aspirin 81 mg oral delayed release tablet: 1 tab(s) orally once a day  atorvastatin 20 mg oral tablet: 1 tab(s) orally once a day (at bedtime)  finasteride 5 mg oral tablet: 1 tab(s) orally once a day  furosemide 40 mg oral tablet: 1 tab(s) orally every 12 hours  isosorbide mononitrate 30 mg oral tablet, extended release: 1 tab(s) orally once a day  levothyroxine 50 mcg (0.05 mg) oral tablet: 1 tab(s) orally once a day  metoprolol tartrate 50 mg oral tablet: 1 tab(s) orally 2 times a day  potassium chloride 20 mEq/15 mL oral liquid: 15 milliliter(s) orally once a day

## 2021-04-24 NOTE — DISCHARGE NOTE PROVIDER - NSDCFUADDAPPT_GEN_ALL_CORE_FT
Please call and make a follow up appointment with your PCP upon discharge from hospital.    Please call and make follow up appointment with care provider listed upon discharge from hospital.

## 2021-04-24 NOTE — DISCHARGE NOTE PROVIDER - NSDCCPCAREPLAN_GEN_ALL_CORE_FT
PRINCIPAL DISCHARGE DIAGNOSIS  Diagnosis: Acute on chronic congestive heart failure, unspecified heart failure type  Assessment and Plan of Treatment: - DC: Lasix 40 mg PO BID  - metoprolol 50 BID, imdur 30 mg qD  Persistent atrial fibrillation.   - c/w metoprolol bid   - c/w eliquis 2.5 mg bid; monitor renal clearance.   CAD (coronary artery disease).  - c/w lipitor 20 mg and Asa 81 mg.

## 2021-04-25 LAB
ANION GAP SERPL CALC-SCNC: 15 MMOL/L — SIGNIFICANT CHANGE UP (ref 5–17)
BASOPHILS # BLD AUTO: 0.08 K/UL — SIGNIFICANT CHANGE UP (ref 0–0.2)
BASOPHILS NFR BLD AUTO: 1.3 % — SIGNIFICANT CHANGE UP (ref 0–2)
BUN SERPL-MCNC: 41 MG/DL — HIGH (ref 7–23)
CALCIUM SERPL-MCNC: 8.4 MG/DL — SIGNIFICANT CHANGE UP (ref 8.4–10.5)
CHLORIDE SERPL-SCNC: 99 MMOL/L — SIGNIFICANT CHANGE UP (ref 96–108)
CO2 SERPL-SCNC: 23 MMOL/L — SIGNIFICANT CHANGE UP (ref 22–31)
CREAT SERPL-MCNC: 2.16 MG/DL — HIGH (ref 0.5–1.3)
EOSINOPHIL # BLD AUTO: 0.54 K/UL — HIGH (ref 0–0.5)
EOSINOPHIL NFR BLD AUTO: 8.6 % — HIGH (ref 0–6)
GLUCOSE SERPL-MCNC: 79 MG/DL — SIGNIFICANT CHANGE UP (ref 70–99)
HCT VFR BLD CALC: 34.7 % — LOW (ref 39–50)
HGB BLD-MCNC: 10.4 G/DL — LOW (ref 13–17)
IMM GRANULOCYTES NFR BLD AUTO: 0.3 % — SIGNIFICANT CHANGE UP (ref 0–1.5)
LYMPHOCYTES # BLD AUTO: 0.87 K/UL — LOW (ref 1–3.3)
LYMPHOCYTES # BLD AUTO: 13.8 % — SIGNIFICANT CHANGE UP (ref 13–44)
MAGNESIUM SERPL-MCNC: 2.3 MG/DL — SIGNIFICANT CHANGE UP (ref 1.6–2.6)
MCHC RBC-ENTMCNC: 23.6 PG — LOW (ref 27–34)
MCHC RBC-ENTMCNC: 30 GM/DL — LOW (ref 32–36)
MCV RBC AUTO: 78.9 FL — LOW (ref 80–100)
MONOCYTES # BLD AUTO: 0.76 K/UL — SIGNIFICANT CHANGE UP (ref 0–0.9)
MONOCYTES NFR BLD AUTO: 12 % — SIGNIFICANT CHANGE UP (ref 2–14)
NEUTROPHILS # BLD AUTO: 4.04 K/UL — SIGNIFICANT CHANGE UP (ref 1.8–7.4)
NEUTROPHILS NFR BLD AUTO: 64 % — SIGNIFICANT CHANGE UP (ref 43–77)
NRBC # BLD: 0 /100 WBCS — SIGNIFICANT CHANGE UP (ref 0–0)
PLATELET # BLD AUTO: 282 K/UL — SIGNIFICANT CHANGE UP (ref 150–400)
POTASSIUM SERPL-MCNC: 3.8 MMOL/L — SIGNIFICANT CHANGE UP (ref 3.5–5.3)
POTASSIUM SERPL-SCNC: 3.8 MMOL/L — SIGNIFICANT CHANGE UP (ref 3.5–5.3)
RBC # BLD: 4.4 M/UL — SIGNIFICANT CHANGE UP (ref 4.2–5.8)
RBC # FLD: 19.8 % — HIGH (ref 10.3–14.5)
SODIUM SERPL-SCNC: 137 MMOL/L — SIGNIFICANT CHANGE UP (ref 135–145)
WBC # BLD: 6.31 K/UL — SIGNIFICANT CHANGE UP (ref 3.8–10.5)
WBC # FLD AUTO: 6.31 K/UL — SIGNIFICANT CHANGE UP (ref 3.8–10.5)

## 2021-04-25 RX ORDER — ACETAMINOPHEN 500 MG
650 TABLET ORAL EVERY 6 HOURS
Refills: 0 | Status: DISCONTINUED | OUTPATIENT
Start: 2021-04-25 | End: 2021-04-27

## 2021-04-25 RX ADMIN — Medication 40 MILLIGRAM(S): at 21:33

## 2021-04-25 RX ADMIN — ATORVASTATIN CALCIUM 20 MILLIGRAM(S): 80 TABLET, FILM COATED ORAL at 21:34

## 2021-04-25 RX ADMIN — Medication 50 MILLIGRAM(S): at 21:33

## 2021-04-25 RX ADMIN — APIXABAN 2.5 MILLIGRAM(S): 2.5 TABLET, FILM COATED ORAL at 05:28

## 2021-04-25 RX ADMIN — FINASTERIDE 5 MILLIGRAM(S): 5 TABLET, FILM COATED ORAL at 12:19

## 2021-04-25 RX ADMIN — ISOSORBIDE MONONITRATE 30 MILLIGRAM(S): 60 TABLET, EXTENDED RELEASE ORAL at 12:19

## 2021-04-25 RX ADMIN — Medication 81 MILLIGRAM(S): at 12:20

## 2021-04-25 RX ADMIN — Medication 50 MICROGRAM(S): at 05:28

## 2021-04-25 RX ADMIN — Medication 50 MILLIGRAM(S): at 05:28

## 2021-04-25 RX ADMIN — Medication 40 MILLIGRAM(S): at 05:27

## 2021-04-25 RX ADMIN — APIXABAN 2.5 MILLIGRAM(S): 2.5 TABLET, FILM COATED ORAL at 18:06

## 2021-04-26 ENCOUNTER — TRANSCRIPTION ENCOUNTER (OUTPATIENT)
Age: 86
End: 2021-04-26

## 2021-04-26 LAB
ANION GAP SERPL CALC-SCNC: 15 MMOL/L — SIGNIFICANT CHANGE UP (ref 5–17)
BASOPHILS # BLD AUTO: 0.09 K/UL — SIGNIFICANT CHANGE UP (ref 0–0.2)
BASOPHILS NFR BLD AUTO: 1.4 % — SIGNIFICANT CHANGE UP (ref 0–2)
BUN SERPL-MCNC: 39 MG/DL — HIGH (ref 7–23)
CALCIUM SERPL-MCNC: 8.4 MG/DL — SIGNIFICANT CHANGE UP (ref 8.4–10.5)
CHLORIDE SERPL-SCNC: 97 MMOL/L — SIGNIFICANT CHANGE UP (ref 96–108)
CO2 SERPL-SCNC: 24 MMOL/L — SIGNIFICANT CHANGE UP (ref 22–31)
CREAT SERPL-MCNC: 2.1 MG/DL — HIGH (ref 0.5–1.3)
EOSINOPHIL # BLD AUTO: 0.57 K/UL — HIGH (ref 0–0.5)
EOSINOPHIL NFR BLD AUTO: 8.8 % — HIGH (ref 0–6)
GLUCOSE SERPL-MCNC: 72 MG/DL — SIGNIFICANT CHANGE UP (ref 70–99)
HCT VFR BLD CALC: 35.5 % — LOW (ref 39–50)
HGB BLD-MCNC: 10.3 G/DL — LOW (ref 13–17)
IMM GRANULOCYTES NFR BLD AUTO: 0.3 % — SIGNIFICANT CHANGE UP (ref 0–1.5)
LYMPHOCYTES # BLD AUTO: 0.87 K/UL — LOW (ref 1–3.3)
LYMPHOCYTES # BLD AUTO: 13.5 % — SIGNIFICANT CHANGE UP (ref 13–44)
MAGNESIUM SERPL-MCNC: 2.4 MG/DL — SIGNIFICANT CHANGE UP (ref 1.6–2.6)
MCHC RBC-ENTMCNC: 23 PG — LOW (ref 27–34)
MCHC RBC-ENTMCNC: 29 GM/DL — LOW (ref 32–36)
MCV RBC AUTO: 79.2 FL — LOW (ref 80–100)
MONOCYTES # BLD AUTO: 0.88 K/UL — SIGNIFICANT CHANGE UP (ref 0–0.9)
MONOCYTES NFR BLD AUTO: 13.6 % — SIGNIFICANT CHANGE UP (ref 2–14)
NEUTROPHILS # BLD AUTO: 4.03 K/UL — SIGNIFICANT CHANGE UP (ref 1.8–7.4)
NEUTROPHILS NFR BLD AUTO: 62.4 % — SIGNIFICANT CHANGE UP (ref 43–77)
NRBC # BLD: 0 /100 WBCS — SIGNIFICANT CHANGE UP (ref 0–0)
PLATELET # BLD AUTO: 284 K/UL — SIGNIFICANT CHANGE UP (ref 150–400)
POTASSIUM SERPL-MCNC: 3.7 MMOL/L — SIGNIFICANT CHANGE UP (ref 3.5–5.3)
POTASSIUM SERPL-SCNC: 3.7 MMOL/L — SIGNIFICANT CHANGE UP (ref 3.5–5.3)
RBC # BLD: 4.48 M/UL — SIGNIFICANT CHANGE UP (ref 4.2–5.8)
RBC # FLD: 20.1 % — HIGH (ref 10.3–14.5)
SARS-COV-2 RNA SPEC QL NAA+PROBE: SIGNIFICANT CHANGE UP
SODIUM SERPL-SCNC: 136 MMOL/L — SIGNIFICANT CHANGE UP (ref 135–145)
WBC # BLD: 6.46 K/UL — SIGNIFICANT CHANGE UP (ref 3.8–10.5)
WBC # FLD AUTO: 6.46 K/UL — SIGNIFICANT CHANGE UP (ref 3.8–10.5)

## 2021-04-26 RX ORDER — ACETAMINOPHEN 500 MG
2 TABLET ORAL
Qty: 0 | Refills: 0 | DISCHARGE
Start: 2021-04-26

## 2021-04-26 RX ORDER — FINASTERIDE 5 MG/1
1 TABLET, FILM COATED ORAL
Qty: 0 | Refills: 0 | DISCHARGE
Start: 2021-04-26

## 2021-04-26 RX ORDER — FINASTERIDE 5 MG/1
1 TABLET, FILM COATED ORAL
Qty: 0 | Refills: 0 | DISCHARGE

## 2021-04-26 RX ORDER — ASPIRIN/CALCIUM CARB/MAGNESIUM 324 MG
1 TABLET ORAL
Qty: 0 | Refills: 0 | DISCHARGE
Start: 2021-04-26

## 2021-04-26 RX ORDER — ISOSORBIDE MONONITRATE 60 MG/1
1 TABLET, EXTENDED RELEASE ORAL
Qty: 0 | Refills: 0 | DISCHARGE

## 2021-04-26 RX ORDER — ISOSORBIDE MONONITRATE 60 MG/1
1 TABLET, EXTENDED RELEASE ORAL
Qty: 0 | Refills: 0 | DISCHARGE
Start: 2021-04-26

## 2021-04-26 RX ORDER — ATORVASTATIN CALCIUM 80 MG/1
1 TABLET, FILM COATED ORAL
Qty: 0 | Refills: 0 | DISCHARGE

## 2021-04-26 RX ORDER — ASPIRIN/CALCIUM CARB/MAGNESIUM 324 MG
1 TABLET ORAL
Qty: 0 | Refills: 0 | DISCHARGE

## 2021-04-26 RX ORDER — FUROSEMIDE 40 MG
1 TABLET ORAL
Qty: 0 | Refills: 0 | DISCHARGE
Start: 2021-04-26

## 2021-04-26 RX ORDER — METOPROLOL TARTRATE 50 MG
1 TABLET ORAL
Qty: 0 | Refills: 0 | DISCHARGE
Start: 2021-04-26

## 2021-04-26 RX ORDER — ACETAMINOPHEN 500 MG
2 TABLET ORAL
Qty: 180 | Refills: 0

## 2021-04-26 RX ORDER — ATORVASTATIN CALCIUM 80 MG/1
1 TABLET, FILM COATED ORAL
Qty: 0 | Refills: 0 | DISCHARGE
Start: 2021-04-26

## 2021-04-26 RX ORDER — APIXABAN 2.5 MG/1
1 TABLET, FILM COATED ORAL
Qty: 0 | Refills: 0 | DISCHARGE
Start: 2021-04-26

## 2021-04-26 RX ORDER — APIXABAN 2.5 MG/1
1 TABLET, FILM COATED ORAL
Qty: 0 | Refills: 0 | DISCHARGE

## 2021-04-26 RX ADMIN — APIXABAN 2.5 MILLIGRAM(S): 2.5 TABLET, FILM COATED ORAL at 17:43

## 2021-04-26 RX ADMIN — APIXABAN 2.5 MILLIGRAM(S): 2.5 TABLET, FILM COATED ORAL at 05:29

## 2021-04-26 RX ADMIN — Medication 50 MILLIGRAM(S): at 05:29

## 2021-04-26 RX ADMIN — ISOSORBIDE MONONITRATE 30 MILLIGRAM(S): 60 TABLET, EXTENDED RELEASE ORAL at 11:39

## 2021-04-26 RX ADMIN — Medication 81 MILLIGRAM(S): at 11:38

## 2021-04-26 RX ADMIN — Medication 40 MILLIGRAM(S): at 21:12

## 2021-04-26 RX ADMIN — FINASTERIDE 5 MILLIGRAM(S): 5 TABLET, FILM COATED ORAL at 11:39

## 2021-04-26 RX ADMIN — Medication 50 MICROGRAM(S): at 05:29

## 2021-04-26 RX ADMIN — ATORVASTATIN CALCIUM 20 MILLIGRAM(S): 80 TABLET, FILM COATED ORAL at 21:12

## 2021-04-26 RX ADMIN — Medication 40 MILLIGRAM(S): at 10:21

## 2021-04-26 RX ADMIN — Medication 50 MILLIGRAM(S): at 17:43

## 2021-04-26 NOTE — PROGRESS NOTE ADULT - PROBLEM SELECTOR PLAN 1
continue lasix 40 mg PO BID,  please check daily weight . if well, continue current meds and D/c in AM ,
continue lasix 40 mg PO BID, if well, continue current meds and D/c in AM ,
continue lasix 40 IV daily. [facility] with [facility] as to what is home dose
continue lasix 40 IV daily today, tomorrow, change to 40 mg PO BID

## 2021-04-26 NOTE — PROGRESS NOTE ADULT - PROBLEM SELECTOR PROBLEM 5
Familial hypercholesterolemia

## 2021-04-26 NOTE — DISCHARGE NOTE NURSING/CASE MANAGEMENT/SOCIAL WORK - PATIENT PORTAL LINK FT
You can access the FollowMyHealth Patient Portal offered by [facility] by registering at the following website: [email or web address]. By joining [facility]’s FollowMyHealth portal, you will also be able to view your health information using other applications (apps) compatible with our system.

## 2021-04-26 NOTE — PROGRESS NOTE ADULT - PROBLEM SELECTOR PLAN 7
ST Mo device- please call PPM NP to check

## 2021-04-26 NOTE — PROGRESS NOTE ADULT - PROBLEM SELECTOR PROBLEM 4
Hypothyroidism (acquired)

## 2021-04-26 NOTE — PROGRESS NOTE ADULT - PROBLEM SELECTOR PROBLEM 6
Persistent atrial fibrillation

## 2021-04-26 NOTE — PROGRESS NOTE ADULT - PROBLEM SELECTOR PLAN 2
continue Imdur, metoprolol, asa

## 2021-04-27 VITALS — SYSTOLIC BLOOD PRESSURE: 120 MMHG | DIASTOLIC BLOOD PRESSURE: 64 MMHG

## 2021-04-27 PROCEDURE — 85730 THROMBOPLASTIN TIME PARTIAL: CPT

## 2021-04-27 PROCEDURE — 85025 COMPLETE CBC W/AUTO DIFF WBC: CPT

## 2021-04-27 PROCEDURE — 85610 PROTHROMBIN TIME: CPT

## 2021-04-27 PROCEDURE — 80053 COMPREHEN METABOLIC PANEL: CPT

## 2021-04-27 PROCEDURE — 97161 PT EVAL LOW COMPLEX 20 MIN: CPT

## 2021-04-27 PROCEDURE — 71045 X-RAY EXAM CHEST 1 VIEW: CPT

## 2021-04-27 PROCEDURE — 83735 ASSAY OF MAGNESIUM: CPT

## 2021-04-27 PROCEDURE — 83880 ASSAY OF NATRIURETIC PEPTIDE: CPT

## 2021-04-27 PROCEDURE — U0005: CPT

## 2021-04-27 PROCEDURE — 80048 BASIC METABOLIC PNL TOTAL CA: CPT

## 2021-04-27 PROCEDURE — 84484 ASSAY OF TROPONIN QUANT: CPT

## 2021-04-27 PROCEDURE — U0003: CPT

## 2021-04-27 PROCEDURE — 86769 SARS-COV-2 COVID-19 ANTIBODY: CPT

## 2021-04-27 PROCEDURE — 99285 EMERGENCY DEPT VISIT HI MDM: CPT

## 2021-04-27 RX ORDER — POTASSIUM CHLORIDE 20 MEQ
15 PACKET (EA) ORAL
Qty: 0 | Refills: 0 | DISCHARGE

## 2021-04-27 RX ADMIN — APIXABAN 2.5 MILLIGRAM(S): 2.5 TABLET, FILM COATED ORAL at 05:18

## 2021-04-27 RX ADMIN — Medication 40 MILLIGRAM(S): at 10:43

## 2021-04-27 RX ADMIN — Medication 50 MICROGRAM(S): at 05:15

## 2021-04-27 RX ADMIN — Medication 50 MILLIGRAM(S): at 05:15

## 2021-04-27 NOTE — PROGRESS NOTE ADULT - NSICDXPILOT_GEN_ALL_CORE
[place]

## 2021-04-27 NOTE — PROGRESS NOTE ADULT - PROVIDER SPECIALTY LIST ADULT
Internal Medicine
Electrophysiology
Internal Medicine
Internal Medicine
Electrophysiology

## 2021-04-27 NOTE — PROGRESS NOTE ADULT - REASON FOR ADMISSION
Acute on chronic heart failure

## 2021-04-27 NOTE — PROGRESS NOTE ADULT - ASSESSMENT
89M w/ HFpEF, CAD, Afib on eliquis, SSS s/p PPM, CKD4, HTN presents for evaluation of b/l LE swelling admit to medicine for acute on chronic HFpEF.    Acute on chronic congestive heart failure, unspecified heart failure type.   - c/w lasix 40mg IV daily  - metoprolol 50BID, imdur 30 mg qD  - cardiology consult  - Dash diet, strict In/Out.     Acute kidney injury superimposed on chronic kidney disease.   - Likely secondary to acute CHF exacerbation but will r/o obstructive pathology  - bladder scan and if needed straight cath  - Monitor strict In/Outs.     Persistent atrial fibrillation.   - c/w metoprolol bid   - c/w eliquis 2.5 mg bid; monitor renal clearance.     CAD (coronary artery disease).  - c/w lipitor 20 mg and Asa 81 mg.     Hypothyroidism (acquired).   - c/w synthroid.     Essential hypertension.  - c/w imdur and metoprolol.    BPH (benign prostatic hyperplasia).   - c/w finasteride.     PPM check    Prophylactic measure.   - DVT ppx: eliquis as above.     Kofi Sanchez MD pager [phone]   
89M w/ HFpEF, CAD, Afib on eliquis, SSS s/p PPM, CKD4, HTN presents for evaluation of b/l LE swelling admit to medicine for acute on chronic HFpEF.    Acute on chronic congestive heart failure, unspecified heart failure type.   - c/w lasix 40mg po bid.   - metoprolol 50 BID, imdur 30 mg qD  - cardiology follow  - Dash diet, strict In/Out.   - cleared for DC    Acute kidney injury superimposed on chronic kidney disease.   - Likely secondary to acute CHF exacerbation but will r/o obstructive pathology  - bladder scan and if needed straight cath  - Monitor strict In/Outs.     Persistent atrial fibrillation.   - c/w metoprolol bid   - c/w eliquis 2.5 mg bid; monitor renal clearance.     CAD (coronary artery disease).  - c/w lipitor 20 mg and Asa 81 mg.     Hypothyroidism (acquired).   - c/w synthroid.     Essential hypertension.  - c/w imdur and metoprolol.    BPH (benign prostatic hyperplasia).   - c/w finasteride.     PPM check    Prophylactic measure.   - DVT ppx: eliquis as above.     DC to NH. Follow with PMD/ Cardiology in 3-4 days. QA     Kofi Sanchez MD pager [phone] 
89M w/ HFpEF, CAD, Afib on eliquis, SSS s/p PPM, CKD4, HTN presents for evaluation of b/l LE swelling admit to medicine for acute on chronic HFpEF.    Acute on chronic congestive heart failure, unspecified heart failure type.   - c/w lasix 40mg po bid.   - metoprolol 50 BID, imdur 30 mg qD  - cardiology follow  - Dash diet, strict In/Out.     Acute kidney injury superimposed on chronic kidney disease.   - Likely secondary to acute CHF exacerbation but will r/o obstructive pathology  - bladder scan and if needed straight cath  - Monitor strict In/Outs.     Persistent atrial fibrillation.   - c/w metoprolol bid   - c/w eliquis 2.5 mg bid; monitor renal clearance.     CAD (coronary artery disease).  - c/w lipitor 20 mg and Asa 81 mg.     Hypothyroidism (acquired).   - c/w synthroid.     Essential hypertension.  - c/w imdur and metoprolol.    BPH (benign prostatic hyperplasia).   - c/w finasteride.     PPM check    Prophylactic measure.   - DVT ppx: eliquis as above.     DCP to NH in am if stable.     Kofi Sanchez MD pager [phone] 
89M w/ HFpEF, CAD, Afib on eliquis, SSS s/p PPM, CKD4, HTN presents for evaluation of b/l LE swelling admit to medicine for acute on chronic HFpEF.    Acute on chronic congestive heart failure, unspecified heart failure type.   - c/w lasix 40mg IV daily. Change to oral in am.   - metoprolol 50 BID, imdur 30 mg qD  - cardiology consult  - Dash diet, strict In/Out.     Acute kidney injury superimposed on chronic kidney disease.   - Likely secondary to acute CHF exacerbation but will r/o obstructive pathology  - bladder scan and if needed straight cath  - Monitor strict In/Outs.     Persistent atrial fibrillation.   - c/w metoprolol bid   - c/w eliquis 2.5 mg bid; monitor renal clearance.     CAD (coronary artery disease).  - c/w lipitor 20 mg and Asa 81 mg.     Hypothyroidism (acquired).   - c/w synthroid.     Essential hypertension.  - c/w imdur and metoprolol.    BPH (benign prostatic hyperplasia).   - c/w finasteride.     PPM check    Prophylactic measure.   - DVT ppx: eliquis as above.     Kofi Sanchez MD pager [phone] 
89M w/ HFpEF, CAD, Afib on eliquis, SSS s/p PPM, CKD4, HTN presents for evaluation of b/l LE swelling admit to medicine for acute on chronic HFpEF.    Acute on chronic congestive heart failure, unspecified heart failure type.   - c/w lasix 40mg po bid.   - metoprolol 50 BID, imdur 30 mg qD  - cardiology consult  - Dash diet, strict In/Out.     Acute kidney injury superimposed on chronic kidney disease.   - Likely secondary to acute CHF exacerbation but will r/o obstructive pathology  - bladder scan and if needed straight cath  - Monitor strict In/Outs.     Persistent atrial fibrillation.   - c/w metoprolol bid   - c/w eliquis 2.5 mg bid; monitor renal clearance.     CAD (coronary artery disease).  - c/w lipitor 20 mg and Asa 81 mg.     Hypothyroidism (acquired).   - c/w synthroid.     Essential hypertension.  - c/w imdur and metoprolol.    BPH (benign prostatic hyperplasia).   - c/w finasteride.     PPM check    Prophylactic measure.   - DVT ppx: eliquis as above.     DCP to NH.     Kofi Sanchez MD pager [phone] 
89M w/ HFpEF, CAD, Afib on eliquis, SSS s/p PPM, CKD4, HTN presents for evaluation of b/l LE swelling admit to medicine for acute on chronic HFpEF.    Acute on chronic congestive heart failure, unspecified heart failure type.   - c/w lasix 40mg po bid.   - metoprolol 50 BID, imdur 30 mg qD  - cardiology follow  - Dash diet, strict In/Out.     Acute kidney injury superimposed on chronic kidney disease.   - Likely secondary to acute CHF exacerbation but will r/o obstructive pathology  - bladder scan and if needed straight cath  - Monitor strict In/Outs.     Persistent atrial fibrillation.   - c/w metoprolol bid   - c/w eliquis 2.5 mg bid; monitor renal clearance.     CAD (coronary artery disease).  - c/w lipitor 20 mg and Asa 81 mg.     Hypothyroidism (acquired).   - c/w synthroid.     Essential hypertension.  - c/w imdur and metoprolol.    BPH (benign prostatic hyperplasia).   - c/w finasteride.     PPM check    Prophylactic measure.   - DVT ppx: eliquis as above.     DCP to NH in am if stable.     Kofi Sanchez MD pager [phone] 
89 year male with h/o HTN hyperlipidemia CRI CAD + stress test , SSS post PPM persistent atrial fibrillation, presents with acute dyspnea on exertion and pedal edema, acute on chronic diastolic heart failure, acute on chronic renal failure    patient appears improved post furosemide 40 IV daily.     restart lasix 40 BID PO . daily weights -> if weight start to increased, may need to increase to 60 am/40 PM
89 year male with h/o HTN hyperlipidemia CRI CAD + stress test , SSS post PPM persistent atrial fibrillation, presents with acute dyspnea on exertion and pedal edema, acute on chronic diastolic heart failure, acute on chronic renal failure    patient appears improved post furosemide 40 IV daily.     restarted on lasix 40 BID , watch daily weight and lower extremites to assure no recurrent fluid accumulation., check orthostatics
89 year male with h/o HTN hyperlipidemia CRI CAD + stress test , SSS post PPM persistent atrial fibrillation, presents with acute dyspnea on exertion and pedal edema, acute on chronic diastolic heart failure, acute on chronic renal failure    patient appears improved post furosemide 40 IV daily.     need [facility] with [facility]- what is home dose of lasix ?
89 year male with h/o HTN hyperlipidemia CRI CAD + stress test , SSS post PPM persistent atrial fibrillation, presents with acute dyspnea on exertion and pedal edema, acute on chronic diastolic heart failure, acute on chronic renal failure    patient appears improved post furosemide 40 IV daily.     restarted on lasix 40 BID PO , watch daily weight and lower extremites to assure no recurrent fluid accumulation., check orthostatics

## 2021-04-27 NOTE — PROGRESS NOTE ADULT - SUBJECTIVE AND OBJECTIVE BOX
Patient is a 89y old  Male who presents with a chief complaint of Acute on chronic heart failure (23 Apr 2021 19:01)      SUBJECTIVE / OVERNIGHT EVENTS: feels better.  Review of Systems  chest pain no  palpitations no  sob no  nausea no  headache no    MEDICATIONS  (STANDING):  apixaban 2.5 milliGRAM(s) Oral every 12 hours  aspirin enteric coated 81 milliGRAM(s) Oral daily  atorvastatin 20 milliGRAM(s) Oral at bedtime  finasteride 5 milliGRAM(s) Oral daily  isosorbide   mononitrate ER Tablet (IMDUR) 30 milliGRAM(s) Oral daily  levothyroxine 50 MICROGram(s) Oral daily  metoprolol tartrate 50 milliGRAM(s) Oral two times a day    MEDICATIONS  (PRN):      Vital Signs Last 24 Hrs  T(C): 36.4 (23 Apr 2021 12:52), Max: 36.4 (22 Apr 2021 21:00)  T(F): 97.5 (23 Apr 2021 12:52), Max: 97.6 (22 Apr 2021 21:00)  HR: 59 (23 Apr 2021 12:52) (59 - 83)  BP: 109/76 (23 Apr 2021 12:52) (107/69 - 119/67)  BP(mean): --  RR: 18 (23 Apr 2021 12:52) (18 - 18)  SpO2: 98% (23 Apr 2021 12:52) (94% - 98%)    PHYSICAL EXAM:  GENERAL: NAD, well-developed  HEAD:  Atraumatic, Normocephalic  EYES: EOMI, PERRLA, conjunctiva and sclera clear  NECK: Supple, No JVD  CHEST/LUNG: few rales to auscultation bilaterally   HEART: Regular rate and rhythm; No murmurs, rubs, or gallops  ABDOMEN: Soft, Nontender, Nondistended; Bowel sounds present  EXTREMITIES:  2+ bipedal edema  NEUROLOGY: non-focal  SKIN: No rashes or lesions    LABS:                        10.6   6.30  )-----------( 278      ( 22 Apr 2021 06:34 )             36.1     04-22    135  |  97  |  47<H>  ----------------------------<  70  4.5   |  22  |  2.00<H>    Ca    8.7      22 Apr 2021 06:33  Mg     2.4     04-22                  RADIOLOGY & ADDITIONAL TESTS:    Imaging Personally Reviewed:    Consultant(s) Notes Reviewed:      Care Discussed with Consultants/Other Providers:  
Patient is a 89y old  Male who presents with a chief complaint of Acute on chronic heart failure (22 Apr 2021 10:51)      SUBJECTIVE / OVERNIGHT EVENTS: Comfortable without new complaints.   Review of Systems  chest pain no  palpitations no  sob no  nausea no  headache no    MEDICATIONS  (STANDING):  apixaban 2.5 milliGRAM(s) Oral every 12 hours  aspirin enteric coated 81 milliGRAM(s) Oral daily  atorvastatin 20 milliGRAM(s) Oral at bedtime  finasteride 5 milliGRAM(s) Oral daily  furosemide   Injectable 40 milliGRAM(s) IV Push daily  isosorbide   mononitrate ER Tablet (IMDUR) 30 milliGRAM(s) Oral daily  levothyroxine 50 MICROGram(s) Oral daily  metoprolol tartrate 50 milliGRAM(s) Oral two times a day    MEDICATIONS  (PRN):      Vital Signs Last 24 Hrs  T(C): 36.7 (22 Apr 2021 13:30), Max: 36.7 (22 Apr 2021 13:30)  T(F): 98.1 (22 Apr 2021 13:30), Max: 98.1 (22 Apr 2021 13:30)  HR: 68 (22 Apr 2021 18:42) (59 - 68)  BP: 126/82 (22 Apr 2021 18:42) (117/78 - 145/75)  BP(mean): --  RR: 18 (22 Apr 2021 13:30) (12 - 19)  SpO2: 98% (22 Apr 2021 18:42) (94% - 100%)    PHYSICAL EXAM:  GENERAL: NAD, well-developed  HEAD:  Atraumatic, Normocephalic  EYES: EOMI, PERRLA, conjunctiva and sclera clear  NECK: Supple, No JVD  CHEST/LUNG: few basilar rales   HEART: Regular rate and rhythm; No murmurs, rubs, or gallops   ABDOMEN: Soft, Nontender, Nondistended; Bowel sounds present  EXTREMITIES:  2+ bipedal edema  PSYCH: AAOx3  NEUROLOGY: non-focal  SKIN: No rashes or lesions    LABS:                        10.6   6.30  )-----------( 278      ( 22 Apr 2021 06:34 )             36.1     04-22    135  |  97  |  47<H>  ----------------------------<  70  4.5   |  22  |  2.00<H>    Ca    8.7      22 Apr 2021 06:33  Mg     2.4     04-22    TPro  6.8  /  Alb  3.4  /  TBili  0.5  /  DBili  x   /  AST  27  /  ALT  12  /  AlkPhos  110  04-21    PT/INR - ( 21 Apr 2021 18:04 )   PT: 14.7 sec;   INR: 1.24 ratio         PTT - ( 21 Apr 2021 18:04 )  PTT:33.4 sec            RADIOLOGY & ADDITIONAL TESTS:    Imaging Personally Reviewed:    Consultant(s) Notes Reviewed:      Care Discussed with Consultants/Other Providers:  
Patient is a 89y old  Male who presents with a chief complaint of Acute on chronic heart failure (25 Apr 2021 17:41)      SUBJECTIVE / OVERNIGHT EVENTS: feels better  Review of Systems  chest pain no  palpitations no  sob improving   nausea no  headache no    MEDICATIONS  (STANDING):  apixaban 2.5 milliGRAM(s) Oral every 12 hours  aspirin enteric coated 81 milliGRAM(s) Oral daily  atorvastatin 20 milliGRAM(s) Oral at bedtime  finasteride 5 milliGRAM(s) Oral daily  furosemide    Tablet 40 milliGRAM(s) Oral every 12 hours  isosorbide   mononitrate ER Tablet (IMDUR) 30 milliGRAM(s) Oral daily  levothyroxine 50 MICROGram(s) Oral daily  metoprolol tartrate 50 milliGRAM(s) Oral two times a day    MEDICATIONS  (PRN):  acetaminophen   Tablet .. 650 milliGRAM(s) Oral every 6 hours PRN Temp greater or equal to 38C (100.4F), Mild Pain (1 - 3)      Vital Signs Last 24 Hrs  T(C): 36.6 (25 Apr 2021 16:52), Max: 36.7 (25 Apr 2021 12:16)  T(F): 97.8 (25 Apr 2021 16:52), Max: 98 (25 Apr 2021 12:16)  HR: 62 (25 Apr 2021 17:48) (59 - 62)  BP: 111/71 (25 Apr 2021 17:48) (101/63 - 128/69)  BP(mean): --  RR: 14 (25 Apr 2021 17:48) (14 - 18)  SpO2: 98% (25 Apr 2021 17:48) (95% - 98%)    PHYSICAL EXAM:  GENERAL: NAD  HEAD:  Atraumatic, Normocephalic  EYES: EOMI, PERRLA, conjunctiva and sclera clear  NECK: Supple, No JVD  CHEST/LUNG: Clear to auscultation bilaterally; No wheeze  HEART: Regular rate and rhythm; No murmurs, rubs, or gallops  ABDOMEN: Soft, Nontender, Nondistended; Bowel sounds present  EXTREMITIES:  2+ Peripheral Pulses, No clubbing, cyanosis, or edema  PSYCH: AAOx3  NEUROLOGY: non-focal  SKIN: No rashes or lesions    LABS:                        10.4   6.31  )-----------( 282      ( 25 Apr 2021 06:40 )             34.7     04-25    137  |  99  |  41<H>  ----------------------------<  79  3.8   |  23  |  2.16<H>    Ca    8.4      25 Apr 2021 06:40  Mg     2.3     04-25                  RADIOLOGY & ADDITIONAL TESTS:    Imaging Personally Reviewed:    Consultant(s) Notes Reviewed:      Care Discussed with Consultants/Other Providers:  
Patient is a 89y old  Male who presents with a chief complaint of Acute on chronic heart failure (25 Apr 2021 18:29)      SUBJECTIVE / OVERNIGHT EVENTS: Comfortable without new complaints.   Review of Systems  chest pain no  palpitations no  sob no  nausea no  headache no    MEDICATIONS  (STANDING):  apixaban 2.5 milliGRAM(s) Oral every 12 hours  aspirin enteric coated 81 milliGRAM(s) Oral daily  atorvastatin 20 milliGRAM(s) Oral at bedtime  finasteride 5 milliGRAM(s) Oral daily  furosemide    Tablet 40 milliGRAM(s) Oral every 12 hours  isosorbide   mononitrate ER Tablet (IMDUR) 30 milliGRAM(s) Oral daily  levothyroxine 50 MICROGram(s) Oral daily  metoprolol tartrate 50 milliGRAM(s) Oral two times a day    MEDICATIONS  (PRN):  acetaminophen   Tablet .. 650 milliGRAM(s) Oral every 6 hours PRN Temp greater or equal to 38C (100.4F), Mild Pain (1 - 3)      Vital Signs Last 24 Hrs  T(C): 36.8 (26 Apr 2021 11:53), Max: 36.8 (25 Apr 2021 21:02)  T(F): 98.2 (26 Apr 2021 11:53), Max: 98.2 (25 Apr 2021 21:02)  HR: 60 (26 Apr 2021 11:53) (59 - 60)  BP: 122/79 (26 Apr 2021 11:53) (119/67 - 130/69)  BP(mean): --  RR: 18 (26 Apr 2021 11:53) (18 - 18)  SpO2: 98% (26 Apr 2021 11:53) (95% - 100%)    PHYSICAL EXAM:  GENERAL: NAD  HEAD:  Atraumatic, Normocephalic  EYES: EOMI, PERRLA, conjunctiva and sclera clear  NECK: Supple, No JVD  CHEST/LUNG: Clear to auscultation bilaterally; No wheeze  HEART: Regular rate and rhythm; No murmurs, rubs, or gallops  ABDOMEN: Soft, Nontender, Nondistended; Bowel sounds present  EXTREMITIES:  2+ Peripheral Pulses, No clubbing, cyanosis, or edema  PSYCH: AAOx3  NEUROLOGY: non-focal  SKIN: No rashes or lesions    LABS:                        10.3   6.46  )-----------( 284      ( 26 Apr 2021 06:44 )             35.5     04-26    136  |  97  |  39<H>  ----------------------------<  72  3.7   |  24  |  2.10<H>    Ca    8.4      26 Apr 2021 06:42  Mg     2.4     04-26                  RADIOLOGY & ADDITIONAL TESTS:    Imaging Personally Reviewed:    Consultant(s) Notes Reviewed:      Care Discussed with Consultants/Other Providers:  
Patient is a 89y old  Male who presents with a chief complaint of Acute on chronic heart failure (26 Apr 2021 20:26)      SUBJECTIVE / OVERNIGHT EVENTS: Comfortable without new complaints.   Review of Systems  chest pain no  palpitations no  sob no  nausea no  headache no    MEDICATIONS  (STANDING):  apixaban 2.5 milliGRAM(s) Oral every 12 hours  aspirin enteric coated 81 milliGRAM(s) Oral daily  atorvastatin 20 milliGRAM(s) Oral at bedtime  finasteride 5 milliGRAM(s) Oral daily  furosemide    Tablet 40 milliGRAM(s) Oral every 12 hours  isosorbide   mononitrate ER Tablet (IMDUR) 30 milliGRAM(s) Oral daily  levothyroxine 50 MICROGram(s) Oral daily  metoprolol tartrate 50 milliGRAM(s) Oral two times a day    MEDICATIONS  (PRN):  acetaminophen   Tablet .. 650 milliGRAM(s) Oral every 6 hours PRN Temp greater or equal to 38C (100.4F), Mild Pain (1 - 3)      Vital Signs Last 24 Hrs  T(C): 36.6 (27 Apr 2021 04:20), Max: 36.6 (27 Apr 2021 04:20)  T(F): 97.8 (27 Apr 2021 04:20), Max: 97.8 (27 Apr 2021 04:20)  HR: 60 (27 Apr 2021 04:20) (60 - 60)  BP: 120/64 (27 Apr 2021 05:17) (107/56 - 123/80)  BP(mean): --  RR: 18 (27 Apr 2021 04:20) (18 - 18)  SpO2: 97% (27 Apr 2021 04:20) (95% - 97%)    PHYSICAL EXAM:  GENERAL: NAD  HEAD:  Atraumatic, Normocephalic  EYES: EOMI, PERRLA, conjunctiva and sclera clear  NECK: Supple, No JVD  CHEST/LUNG: Clear to auscultation bilaterally; No wheeze  HEART: Regular rate and rhythm; No murmurs, rubs, or gallops  ABDOMEN: Soft, Nontender, Nondistended; Bowel sounds present  EXTREMITIES:  2+ Peripheral Pulses, No clubbing, cyanosis, or edema  PSYCH: AAOx3  NEUROLOGY: non-focal  SKIN: No rashes or lesions    LABS:                        10.3   6.46  )-----------( 284      ( 26 Apr 2021 06:44 )             35.5     04-26    136  |  97  |  39<H>  ----------------------------<  72  3.7   |  24  |  2.10<H>    Ca    8.4      26 Apr 2021 06:42  Mg     2.4     04-26                  RADIOLOGY & ADDITIONAL TESTS:    Imaging Personally Reviewed:    Consultant(s) Notes Reviewed:      Care Discussed with Consultants/Other Providers:  
Patient is a 89y old  Male who presents with a chief complaint of Acute on chronic heart failure (24 Apr 2021 14:44)      SUBJECTIVE / OVERNIGHT EVENTS: feels better.  Review of Systems  chest pain no  palpitations no  sob no  nausea no  headache no    MEDICATIONS  (STANDING):  apixaban 2.5 milliGRAM(s) Oral every 12 hours  aspirin enteric coated 81 milliGRAM(s) Oral daily  atorvastatin 20 milliGRAM(s) Oral at bedtime  finasteride 5 milliGRAM(s) Oral daily  furosemide    Tablet 40 milliGRAM(s) Oral every 12 hours  isosorbide   mononitrate ER Tablet (IMDUR) 30 milliGRAM(s) Oral daily  levothyroxine 50 MICROGram(s) Oral daily  metoprolol tartrate 50 milliGRAM(s) Oral two times a day    MEDICATIONS  (PRN):      Vital Signs Last 24 Hrs  T(C): 36.4 (24 Apr 2021 13:00), Max: 36.6 (24 Apr 2021 05:02)  T(F): 97.6 (24 Apr 2021 13:00), Max: 97.8 (24 Apr 2021 05:02)  HR: 59 (24 Apr 2021 13:00) (59 - 62)  BP: 114/60 (24 Apr 2021 13:00) (114/60 - 125/71)  BP(mean): --  RR: 18 (24 Apr 2021 13:00) (18 - 18)  SpO2: 96% (24 Apr 2021 13:00) (96% - 98%)    PHYSICAL EXAM:  GENERAL: NAD, well-developed  HEAD:  Atraumatic, Normocephalic  EYES: EOMI, PERRLA, conjunctiva and sclera clear  NECK: Supple, No JVD  CHEST/LUNG: Clear to auscultation bilaterally; No wheeze  HEART: Regular rate and rhythm; No murmurs, rubs, or gallops  ABDOMEN: Soft, Nontender, Nondistended; Bowel sounds present  EXTREMITIES:  2+bipedal edema  PSYCH: AAOx3  NEUROLOGY: non-focal  SKIN: No rashes or lesions    LABS:    04-24    137  |  95<L>  |  43<H>  ----------------------------<  80  4.0   |  24  |  2.15<H>    Ca    8.5      24 Apr 2021 06:31  Mg     2.4     04-24                  RADIOLOGY & ADDITIONAL TESTS:    Imaging Personally Reviewed:    Consultant(s) Notes Reviewed:      Care Discussed with Consultants/Other Providers:  
INTERVAL HPI/OVERNIGHT EVENTS: patient noted  feels well. on furosemide 40 BID      MEDICATIONS  (STANDING):  apixaban 2.5 milliGRAM(s) Oral every 12 hours  aspirin enteric coated 81 milliGRAM(s) Oral daily  atorvastatin 20 milliGRAM(s) Oral at bedtime  finasteride 5 milliGRAM(s) Oral daily  furosemide    Tablet 40 milliGRAM(s) Oral every 12 hours  isosorbide   mononitrate ER Tablet (IMDUR) 30 milliGRAM(s) Oral daily  levothyroxine 50 MICROGram(s) Oral daily  metoprolol tartrate 50 milliGRAM(s) Oral two times a day    MEDICATIONS  (PRN):  acetaminophen   Tablet .. 650 milliGRAM(s) Oral every 6 hours PRN Temp greater or equal to 38C (100.4F), Mild Pain (1 - 3)      Allergies    No Known Allergies    Intolerances      ROS:  General: Pt denies recent weight loss/fever/chills    Neurological: denies numbness or  sensation loss    Cardiovascular: denies chest pain/palpitations/leg edema    Respiratory and Thorax: denies SOB/cough/wheezing    Gastrointestinal: denies abdominal pain/diarrhea/constipation/bloody stool    Genitourinary: denies urinary frequency/urgency/ dysuria    Musculoskeletal: denies joint pain or swelling, denies restricted motion    Hematologic: denies abnormal bleeding  	    	  	    		        	    	            Vital Signs Last 24 Hrs  T(C): 36.8 (26 Apr 2021 11:53), Max: 36.8 (25 Apr 2021 21:02)  T(F): 98.2 (26 Apr 2021 11:53), Max: 98.2 (25 Apr 2021 21:02)  HR: 60 (26 Apr 2021 11:53) (59 - 60)  BP: 122/79 (26 Apr 2021 11:53) (119/67 - 130/69)  BP(mean): --  RR: 18 (26 Apr 2021 11:53) (18 - 18)  SpO2: 98% (26 Apr 2021 11:53) (95% - 100%)  Daily     Daily     04-25 @ 07:01  -  04-26 @ 07:00  --------------------------------------------------------  IN: 1250 mL / OUT: 1625 mL / NET: -375 mL    04-26 @ 07:01  - 04-26 @ 20:27  --------------------------------------------------------  IN: 840 mL / OUT: 300 mL / NET: 540 mL      Physical Exam:    wdwn male  noJVD  cor RRR  lung clear  abd soft   ext no edema      LABS:                        10.3   6.46  )-----------( 284      ( 26 Apr 2021 06:44 )             35.5     04-26    136  |  97  |  39<H>  ----------------------------<  72  3.7   |  24  |  2.10<H>    Ca    8.4      26 Apr 2021 06:42  Mg     2.4     04-26            RADIOLOGY & ADDITIONAL TESTS:    TELE:    EKG:            
INTERVAL HPI/OVERNIGHT EVENTS: appears euvolemic no chest  paon or dyspnea at rest    MEDICATIONS  (STANDING):  apixaban 2.5 milliGRAM(s) Oral every 12 hours  aspirin enteric coated 81 milliGRAM(s) Oral daily  atorvastatin 20 milliGRAM(s) Oral at bedtime  finasteride 5 milliGRAM(s) Oral daily  furosemide    Tablet 40 milliGRAM(s) Oral <User Schedule>  isosorbide   mononitrate ER Tablet (IMDUR) 30 milliGRAM(s) Oral daily  levothyroxine 50 MICROGram(s) Oral daily  metoprolol tartrate 50 milliGRAM(s) Oral two times a day    MEDICATIONS  (PRN):      Allergies    No Known Allergies    Intolerances      ROS:  General: Pt denies recent weight loss/fever/chills    Neurological: denies numbness or  sensation loss    Cardiovascular: denies chest pain/palpitations/leg edema    Respiratory and Thorax: denies SOB/cough/wheezing    Gastrointestinal: denies abdominal pain/diarrhea/constipation/bloody stool    Genitourinary: denies urinary frequency/urgency/ dysuria    Musculoskeletal: denies joint pain or swelling, denies restricted motion    Hematologic: denies abnormal bleeding  	    	  	    		        	    	            Vital Signs Last 24 Hrs  T(C): 36.4 (2021 12:52), Max: 36.4 (2021 21:00)  T(F): 97.5 (2021 12:52), Max: 97.6 (2021 21:00)  HR: 59 (2021 12:52) (59 - 83)  BP: 109/76 (2021 12:52) (107/69 - 119/67)  BP(mean): --  RR: 18 (2021 12:52) (18 - 18)  SpO2: 98% (2021 12:52) (94% - 98%)  Daily     Daily Weight in k.4 (2021 09:15)     @ 07:01  -   @ 07:00  --------------------------------------------------------  IN: 0 mL / OUT: 1800 mL / NET: -1800 mL     @ 07:01  -   @ 19:01  --------------------------------------------------------  IN: 0 mL / OUT: 300 mL / NET: -300 mL      Physical Exam:   wdwn male nJVCD   cor RRR  lung clear  abd soft   ext no edema      LABS:                        10.6   6.30  )-----------( 278      ( 2021 06:34 )             36.1         135  |  97  |  47<H>  ----------------------------<  70  4.5   |  22  |  2.00<H>    Ca    8.7      2021 06:33  Mg     2.4                 RADIOLOGY & ADDITIONAL TESTS:    TELE:    EKG:            
INTERVAL HPI/OVERNIGHT EVENTS: patient feels well, no chest pain or shortness of breath    MEDICATIONS  (STANDING):  apixaban 2.5 milliGRAM(s) Oral every 12 hours  aspirin enteric coated 81 milliGRAM(s) Oral daily  atorvastatin 20 milliGRAM(s) Oral at bedtime  finasteride 5 milliGRAM(s) Oral daily  furosemide    Tablet 40 milliGRAM(s) Oral every 12 hours  isosorbide   mononitrate ER Tablet (IMDUR) 30 milliGRAM(s) Oral daily  levothyroxine 50 MICROGram(s) Oral daily  metoprolol tartrate 50 milliGRAM(s) Oral two times a day    MEDICATIONS  (PRN):  acetaminophen   Tablet .. 650 milliGRAM(s) Oral every 6 hours PRN Temp greater or equal to 38C (100.4F), Mild Pain (1 - 3)      Allergies    No Known Allergies    Intolerances      ROS:  General: Pt denies recent weight loss/fever/chills    Neurological: denies numbness or  sensation loss    Cardiovascular: denies chest pain/palpitations/leg edema    Respiratory and Thorax: denies SOB/cough/wheezing    Gastrointestinal: denies abdominal pain/diarrhea/constipation/bloody stool    Genitourinary: denies urinary frequency/urgency/ dysuria    Musculoskeletal: denies joint pain or swelling, denies restricted motion    Hematologic: denies abnormal bleeding  	    	  	    		        	    	            Vital Signs Last 24 Hrs  T(C): 36.6 (2021 16:52), Max: 36.7 (2021 12:16)  T(F): 97.8 (2021 16:52), Max: 98 (2021 12:16)  HR: 61 (2021 16:52) (59 - 62)  BP: 101/63 (2021 16:52) (101/63 - 128/69)  BP(mean): --  RR: 14 (2021 16:52) (14 - 18)  SpO2: 98% (2021 16:52) (95% - 98%)  Daily     Daily Weight in k.7 (2021 08:55)     @ 07:01  -   @ 07:00  --------------------------------------------------------  IN: 420 mL / OUT: 900 mL / NET: -480 mL     @ 07:01  -   @ 17:41  --------------------------------------------------------  IN: 850 mL / OUT: 950 mL / NET: -100 mL      Physical Exam:     wdwn male  noJVD  cor RRR  lung clear  abd soft     ext no edema      LABS:                        10.4   6.31  )-----------( 282      ( 2021 06:40 )             34.7         137  |  99  |  41<H>  ----------------------------<  79  3.8   |  23  |  2.16<H>    Ca    8.4      2021 06:40  Mg     2.3                 RADIOLOGY & ADDITIONAL TESTS:    TELE:    EKG:            
INTERVAL HPI/OVERNIGHT EVENTS: patient appears improved, less dyspnea. receiving lasix once daily IV 40 mg    MEDICATIONS  (STANDING):  apixaban 2.5 milliGRAM(s) Oral every 12 hours  aspirin enteric coated 81 milliGRAM(s) Oral daily  atorvastatin 20 milliGRAM(s) Oral at bedtime  finasteride 5 milliGRAM(s) Oral daily  furosemide   Injectable 40 milliGRAM(s) IV Push daily  isosorbide   mononitrate ER Tablet (IMDUR) 30 milliGRAM(s) Oral daily  levothyroxine 50 MICROGram(s) Oral daily  metoprolol tartrate 50 milliGRAM(s) Oral two times a day    MEDICATIONS  (PRN):      Allergies    No Known Allergies    Intolerances      ROS:  General: Pt denies recent weight loss/fever/chills    Neurological: denies numbness or  sensation loss    Cardiovascular: denies chest pain/palpitations/leg edema    Respiratory and Thorax: denies SOB/cough/wheezing    Gastrointestinal: denies abdominal pain/diarrhea/constipation/bloody stool    Genitourinary: denies urinary frequency/urgency/ dysuria    Musculoskeletal: denies joint pain or swelling, denies restricted motion    Hematologic: denies abnormal bleeding  	    	  	    		        	    	            Vital Signs Last 24 Hrs  T(C): 36.3 (2021 04:56), Max: 36.6 (2021 16:48)  T(F): 97.4 (2021 04:56), Max: 97.9 (2021 16:48)  HR: 59 (2021 04:56) (59 - 64)  BP: 125/66 (2021 04:56) (121/72 - 145/75)  BP(mean): --  RR: 18 (2021 04:56) (12 - 19)  SpO2: 98% (2021 04:56) (94% - 100%)  Daily Height in cm: 162.56 (2021 01:32)    Daily Weight in k.4 (2021 10:00)    -21 @ 07:01  -  - @ 07:00  --------------------------------------------------------  IN: 0 mL / OUT: 200 mL / NET: -200 mL     @ 07:01  -   @ 10:51  --------------------------------------------------------  IN: 0 mL / OUT: 800 mL / NET: -800 mL      Physical Exam:      wdwn male   JVD min elevated  cor RRR'  lung decreased bS base  ext improved, less edema    LABS:                        10.6   6.30  )-----------( 278      ( 2021 06:34 )             36.1         135  |  97  |  47<H>  ----------------------------<  70  4.5   |  22  |  2.00<H>    Ca    8.7      2021 06:33  Mg     2.4         TPro  6.8  /  Alb  3.4  /  TBili  0.5  /  DBili  x   /  AST  27  /  ALT  12  /  AlkPhos  110  04    PT/INR - ( 2021 18:04 )   PT: 14.7 sec;   INR: 1.24 ratio         PTT - ( 2021 18:04 )  PTT:33.4 sec      RADIOLOGY & ADDITIONAL TESTS:    TELE:    EKG:

## 2022-04-21 NOTE — ED PROVIDER NOTE - CCCP TRG CHIEF CMPLNT
difficulty breathing Protopic Pregnancy And Lactation Text: This medication is Pregnancy Category C. It is unknown if this medication is excreted in breast milk when applied topically.

## 2022-11-13 NOTE — PROGRESS NOTE ADULT - PROVIDER SPECIALTY LIST ADULT
Electrophysiology
Internal Medicine
Surgery
"I was here for a COVID test"

## 2023-04-19 NOTE — PHYSICAL THERAPY INITIAL EVALUATION ADULT - AMBULATION SKILLS, REHAB EVAL
Department of Anesthesiology  Postprocedure Note    Patient: Sam Nassar  MRN: [id number]  YOB: 1959  Date of evaluation: 4/19/2023      Procedure Summary     Date: 04/19/23 Room / Location: [address] [place] [address]    Anesthesia Start: 1103 Anesthesia Stop: 6653    Procedure: EGD BIOPSY Diagnosis:       Gastroesophageal reflux disease, unspecified whether esophagitis present      (REFLUX)    Surgeons: Berenice Arreola MD Responsible Provider: Clive Coats MD    Anesthesia Type: MAC ASA Status: 3          Anesthesia Type: No value filed.     Eva Phase I: Eva Score: 8    Eva Phase II: Eva Score: 10      Anesthesia Post Evaluation    Patient location during evaluation: PACU  Patient participation: complete - patient participated  Level of consciousness: awake and alert  Pain score: 0  Airway patency: patent  Nausea & Vomiting: no nausea and no vomiting  Complications: no  Cardiovascular status: blood pressure returned to baseline  Respiratory status: acceptable  Hydration status: euvolemic needs device and assist

## 2023-06-01 NOTE — ED ADULT TRIAGE NOTE - WEIGHT IN LBS
[address]  Orthopaedic Surgery  [address] [place] [id number]  Phone: [phone]  Fax: [phone]  Follow Up Time:

## 2023-06-21 NOTE — PATIENT PROFILE ADULT - NSASFUNCLEVELADLTOILET_GEN_A_NUR
Pcp hospital follow up scheduled  Pt is clear for discharge from CM standpoint         06/21/23 1350   Final Note   Assessment Type Final Discharge Note   Anticipated Discharge Disposition Home   Hospital Resources/Appts/Education Provided Appointments scheduled by Navigator/Coordinator        0 = independent

## 2024-06-10 NOTE — ED PROVIDER NOTE - IV ALTEPLASE EXCL REL HIDDEN
show
Patient feels better.  Ambulating in the white.  Results and patient's case discussed with Dr. Fleming.  Can be discharged and will follow-up in office.

## 2024-08-22 NOTE — ED PROVIDER NOTE - MDM PATIENT STATEMENT FOR ADDL TREATMENT
4 = No assist / stand by assistance Patient with one or more new problems requiring additional work-up/treatment.

## 2024-12-18 NOTE — ED ADULT TRIAGE NOTE - CHIEF COMPLAINT QUOTE
Called pt to confirm where he wore it at. He stated they put it on him before leaving the hospital and he wore it for 30 days and returned it here at his apt after being in the hospital.     I am checking with our rep to see if he can find the report on him.      from assisted living with diff breathing; dx with pneumonia via xray; hypoxic on room air; 85% on ra

## 2025-01-07 NOTE — PATIENT PROFILE ADULT - FALL HARM RISK TYPE OF ASSESSMENT
Chidi will get a repeat lipid profile.  If his LDL is not below 70, I will place him back on the rosuvastatin.    Counseled on diet, exercise and weight loss.  He has gained 5 pounds since his last visit.   admission

## 2025-03-31 NOTE — ED PROVIDER NOTE - CONTACT TIME
Attempted to contact patient to confirmed scheduled appointment 04/07/25@10:30am with Dr Valente. Message left for the patient to call the office.    11-Dec-2020 03:46